# Patient Record
Sex: FEMALE | Race: WHITE | NOT HISPANIC OR LATINO | Employment: OTHER | URBAN - METROPOLITAN AREA
[De-identification: names, ages, dates, MRNs, and addresses within clinical notes are randomized per-mention and may not be internally consistent; named-entity substitution may affect disease eponyms.]

---

## 2017-09-28 ENCOUNTER — OFFICE VISIT (OUTPATIENT)
Dept: URGENT CARE | Facility: URGENT CARE | Age: 32
End: 2017-09-28
Payer: COMMERCIAL

## 2017-09-28 VITALS
TEMPERATURE: 98.2 F | SYSTOLIC BLOOD PRESSURE: 112 MMHG | BODY MASS INDEX: 21.55 KG/M2 | DIASTOLIC BLOOD PRESSURE: 62 MMHG | WEIGHT: 117.8 LBS | OXYGEN SATURATION: 99 % | HEART RATE: 51 BPM

## 2017-09-28 DIAGNOSIS — S09.90XA CLOSED HEAD INJURY, INITIAL ENCOUNTER: Primary | ICD-10-CM

## 2017-09-28 PROCEDURE — 99214 OFFICE O/P EST MOD 30 MIN: CPT | Performed by: FAMILY MEDICINE

## 2017-09-28 NOTE — PATIENT INSTRUCTIONS
Okay to take ibuprofen 200 mg - 4 tablets (800 mg) every 8 hours as needed.  Okay to take tylenol 500 mg - 2 tablets (1000 mg) every 6-8 hours as needed, do not exceed 3000 mg in 24 hours.  Ice to area of discomfort.       * HEAD INJURY, no wake-up (Adult)    You have had a head injury. It does not appear serious at this time. Sometimes symptoms of a more serious problem (concussion, bruising or bleeding in the brain) may appear later. Therefore, watch for the warning signs listed below.  HOME CARE:    During the next 24 hours someone must stay with you to check for the signs below. It is not necessary to stay awake or be awakened during the night.    If you have swelling of the face or scalp, apply an ice pack (ice cubes in a plastic bag, wrapped in a towel) for 20 minutes. Do this every 1-2 hours until the swelling starts to go down.    You may use acetaminophen (Tylenol) 650-1000 mg every 6 hours or ibuprofen (Motrin, Advil) 600 mg every 6-8 hours with food to control pain, if you are able to take these medicines. [NOTE: If you have chronic liver or kidney disease or ever had a stomach ulcer or GI bleeding, talk with your doctor before using these medicines.] Do not take aspirin after a head injury.    For the next 24 hours:    Do not take alcohol, sedatives or medicines that make you sleepy.    Do not drive or operate machinery.    Avoid strenuous activities. No lifting or straining.    If you have had any symptoms of a concussion today (nausea, vomiting, dizziness, confusion, headache, memory loss or if you were knocked out), do not return to sports or any activity that could result in another head injury until 2 full weeks after all symptoms are gone and you have been cleared by your doctor. A second head injury before fully recovering from the first one can lead to serious brain injury.  FOLLOW UP with your doctor if symptoms are not improving after 24 hours, or as directed.  GET PROMPT MEDICAL ATTENTION if  any of the following warning signs occur:    Repeated vomiting    Severe or worsening headache or dizziness    Unusual drowsiness, or unable to awaken as usual    Confusion or change in behavior or speech, memory loss, blurred vision    Convulsion (seizure)    Increasing scalp or face swelling    Redness, warmth or pus from the swollen area    Fluid drainage or bleeding from the nose or ears    9581-[address]160 [employer], [address] [place] [address]. All rights reserved. This information is not intended as a substitute for professional medical care. Always follow your healthcare professional's instructions.    Treatment for Mild Traumatic Brain Injury (Concussion)  A mild traumatic brain injury (TBI) is a sudden jolt to your head that causes a temporary change in the way your brain works. It could be caused by a blow to your head, a blast, or a sudden and severe movement of your head that bounces your brain inside your skull. Falls, fights, sports, and car accidents also can cause TBIs.  Treatment of mild TBI   Having a mild TBI can change the way you feel, act, move, and think. Even though you may look fine, a mild TBI can have a big impact on many areas of your life. A mild TBI can cause headaches, fatigue, memory problems, mood swings, and inability to focus your thoughts.  Treatment for mild TBI may be different, depending on symptoms and other unrelated medical issues; therefore, no 2 TBIs are the same. You may need to work with a TBI team -- a group of healthcare providers who help people recover from TBI. For example, you might work with a physical therapist to help with your balance and movement problems. Or you might work with an occupational therapist to help you function better at home and at work. Other medical experts may help you with emotional and thinking problems.  In some cases, your doctor may use medicine to ease symptoms while you recover. These may include pain relievers,  antidepressants, antianxiety medicine, sleep aids, and muscle relaxants. Although medicines can help, they are not a main part of treatment. You should not take any medicines unless discussed and approved by your healthcare provider. Things that you can do for yourself are usually as important as the medicines you are prescribed. This part of your treatment is called self-management.  Self-management for mild TBI  Most people with mild TBI recover completely, but it may take weeks or months. For some people, symptoms may continue for years. Because of this, self-management may continue long after you leave the hospital. Many lifestyle changes that help your brain recover are good habits that you should keep up even after you have recovered. Here are some tips:      Learn as much as you can about TBI. Share what you learn with friends and family.    Let your health care team know about all your symptoms.    Eat a healthy diet and drink plenty of fluids.    Get plenty of sleep.    Don t overexert yourself mentally or physically.    Don t smoke, take drugs, or drink alcohol.    Don t use caffeine or energy drinks as a way to make you feel less tired.    Avoid activities that could cause another jolt to your head. Don't return to sports or any activity that could cause you to hit your head until all symptoms are gone and you have been cleared by your doctor. A second head injury before fully recovering from the first one can lead to serious brain injury. Ask your health care provider what types of activities are safe.    Avoid mental stress. Learn some relaxation techniques like deep breathing.    Keep a pad and pencil handy. Write things down if you are having trouble concentrating or remembering.  Let your healthcare provider know if your symptoms are getting worse. And look out for other important mental symptoms. These include memory loss, having a hard time thinking clearly, having trouble controlling your  emotions, and depression. Also be sure to report physical symptoms such as worsening headaches, loss of balance, changes in vision, seizures, and vomiting.  Recovery from a mild TBI takes time. Be patient and give your brain time to heal. Be sure to rely on your support system, which includes friends and family members who understand what you are going through. You might also want to join a support group and share your feelings with others who have had a TBI.    Date Last Reviewed: 8/17/2015 2000-2017 [email or web address]. [address] [place] [id number]. All rights reserved. This information is not intended as a substitute for professional medical care. Always follow your healthcare professional's instructions.        What is Mild Traumatic Brain Injury (Concussion)?  A mild traumatic brain injury (TBI) is a sudden jolt to your head that causes the way your brain works to change temporarily. It is also called a concussion. This could be caused by a blow to your head, a blast, or a sudden and severe movement of your head that bounces your brain inside your skull. Falls, fights, sports, and motor vehicle accidents are other common causes.  In addition to mild TBI, there are two other types: moderate TBI and severe TBI. Your healthcare team will decide if your TBI is mild, moderate, or severe at the time of the injury. Sometimes the symptoms of a mild TBI are much like those of a more severe TBI. Because every brain is different, it can be hard to predict exactly what your symptoms or your specific recovery will be like.  Diagnosing a mild TBI  Most TBIs are mild. If you have a mild TBI, you might be knocked out for a short time or you might just feel stunned for a while. Your healthcare provider may evaluate you to see if you have had a mild TBI.   Diagnosing a mild TBI may be difficult because symptoms are similar to those of other conditions and signs may not show up on brain scans or X-rays. Your  healthcare provider may base your diagnosis on the following criteria;    Characteristics of the head injury    The types and severity of your symptoms    Risk factors that can lead to longer recovery period  Symptoms of a mild TBI  Having a mild TBI can change your brain in many ways. A mild TBI can change the way you think, feel, or act. The kind of symptoms you have depends on the location and extent that your brain is affected. Common symptoms of mild TBI can occur right away or a while after the injury. Early symptoms may include:    Headache    Dizziness    Not being sure of where you are    Memory problems    Being sick to your stomach    Vomiting    Vision problems  Later symptoms of a mild TBI may include:    Having frequent headaches    Feeling light-headed    Not being able to concentrate or pay attention    Feeling tired most of the time    Getting angry and irritated easily    Being bothered by bright light or loud noise    Having trouble focusing your eyes    Hearing ringing in your ears    Feeling anxious and depressed  Recovering from a mild TBI  Most people recover completely from mild TBI, but it may take days, weeks, or months. For some, symptoms may last even longer. Also, if you have had more than one TBI, your recovery may take longer. Every brain is different, so your recovery time will depend on how quickly your own brain is healing.  Don't return to sports or any activity that could cause you to hit your head until all symptoms are gone and you have been cleared by your doctor. A second head injury before fully recovering from the first one can lead to serious brain injury.  Avoid doing activities that require a lot of concentration or a lot of attention. This will allow your brain to rest and heal more quickly.  You can expect to have some good days and some bad days. It is important to give your brain time to recover and not push yourself too hard. Trying to  tough it out  can make your  symptoms worse. The best way to recover is to discuss symptoms with your healthcare provider, as well as your family. Work closely with your healthcare provider and give your brain time to heal.  Date Last Reviewed: 8/17/2015 2000-2017 [employer]. [address] [place] [address], [place] [address]. All rights reserved. This information is not intended as a substitute for professional medical care. Always follow your healthcare professional's instructions.

## 2017-09-28 NOTE — MR AVS SNAPSHOT
After Visit Summary   9/28/2017    Halie Rendon    MRN: [id number]           Patient Information     Date Of Birth          1985        Visit Information        Provider Department      9/28/2017 11:20 AM Wilner Flower MD [facility]        Today's Diagnoses     Closed head injury, initial encounter    -  1      Care Instructions    Okay to take ibuprofen 200 mg - 4 tablets (800 mg) every 8 hours as needed.  Okay to take tylenol 500 mg - 2 tablets (1000 mg) every 6-8 hours as needed, do not exceed 3000 mg in 24 hours.  Ice to area of discomfort.       * HEAD INJURY, no wake-up (Adult)    You have had a head injury. It does not appear serious at this time. Sometimes symptoms of a more serious problem (concussion, bruising or bleeding in the brain) may appear later. Therefore, watch for the warning signs listed below.  HOME CARE:    During the next 24 hours someone must stay with you to check for the signs below. It is not necessary to stay awake or be awakened during the night.    If you have swelling of the face or scalp, apply an ice pack (ice cubes in a plastic bag, wrapped in a towel) for 20 minutes. Do this every 1-2 hours until the swelling starts to go down.    You may use acetaminophen (Tylenol) 650-1000 mg every 6 hours or ibuprofen (Motrin, Advil) 600 mg every 6-8 hours with food to control pain, if you are able to take these medicines. [NOTE: If you have chronic liver or kidney disease or ever had a stomach ulcer or GI bleeding, talk with your doctor before using these medicines.] Do not take aspirin after a head injury.    For the next 24 hours:    Do not take alcohol, sedatives or medicines that make you sleepy.    Do not drive or operate machinery.    Avoid strenuous activities. No lifting or straining.    If you have had any symptoms of a concussion today (nausea, vomiting, dizziness, confusion, headache, memory loss or if you were knocked out), do not return to  sports or any activity that could result in another head injury until 2 full weeks after all symptoms are gone and you have been cleared by your doctor. A second head injury before fully recovering from the first one can lead to serious brain injury.  FOLLOW UP with your doctor if symptoms are not improving after 24 hours, or as directed.  GET PROMPT MEDICAL ATTENTION if any of the following warning signs occur:    Repeated vomiting    Severe or worsening headache or dizziness    Unusual drowsiness, or unable to awaken as usual    Confusion or change in behavior or speech, memory loss, blurred vision    Convulsion (seizure)    Increasing scalp or face swelling    Redness, warmth or pus from the swollen area    Fluid drainage or bleeding from the nose or ears    [id number] [facility], [address] [place] [address], [place] [id number]. All rights reserved. This information is not intended as a substitute for professional medical care. Always follow your healthcare professional's instructions.    Treatment for Mild Traumatic Brain Injury (Concussion)  A mild traumatic brain injury (TBI) is a sudden jolt to your head that causes a temporary change in the way your brain works. It could be caused by a blow to your head, a blast, or a sudden and severe movement of your head that bounces your brain inside your skull. Falls, fights, sports, and car accidents also can cause TBIs.  Treatment of mild TBI   Having a mild TBI can change the way you feel, act, move, and think. Even though you may look fine, a mild TBI can have a big impact on many areas of your life. A mild TBI can cause headaches, fatigue, memory problems, mood swings, and inability to focus your thoughts.  Treatment for mild TBI may be different, depending on symptoms and other unrelated medical issues; therefore, no 2 TBIs are the same. You may need to work with a TBI team -- a group of healthcare providers who help people recover from TBI. For example, you might  work with a physical therapist to help with your balance and movement problems. Or you might work with an occupational therapist to help you function better at home and at work. Other medical experts may help you with emotional and thinking problems.  In some cases, your doctor may use medicine to ease symptoms while you recover. These may include pain relievers, antidepressants, antianxiety medicine, sleep aids, and muscle relaxants. Although medicines can help, they are not a main part of treatment. You should not take any medicines unless discussed and approved by your healthcare provider. Things that you can do for yourself are usually as important as the medicines you are prescribed. This part of your treatment is called self-management.  Self-management for mild TBI  Most people with mild TBI recover completely, but it may take weeks or months. For some people, symptoms may continue for years. Because of this, self-management may continue long after you leave the hospital. Many lifestyle changes that help your brain recover are good habits that you should keep up even after you have recovered. Here are some tips:      Learn as much as you can about TBI. Share what you learn with friends and family.    Let your health care team know about all your symptoms.    Eat a healthy diet and drink plenty of fluids.    Get plenty of sleep.    Don t overexert yourself mentally or physically.    Don t smoke, take drugs, or drink alcohol.    Don t use caffeine or energy drinks as a way to make you feel less tired.    Avoid activities that could cause another jolt to your head. Don't return to sports or any activity that could cause you to hit your head until all symptoms are gone and you have been cleared by your doctor. A second head injury before fully recovering from the first one can lead to serious brain injury. Ask your health care provider what types of activities are safe.    Avoid mental stress. Learn some  relaxation techniques like deep breathing.    Keep a pad and pencil handy. Write things down if you are having trouble concentrating or remembering.  Let your healthcare provider know if your symptoms are getting worse. And look out for other important mental symptoms. These include memory loss, having a hard time thinking clearly, having trouble controlling your emotions, and depression. Also be sure to report physical symptoms such as worsening headaches, loss of balance, changes in vision, seizures, and vomiting.  Recovery from a mild TBI takes time. Be patient and give your brain time to heal. Be sure to rely on your support system, which includes friends and family members who understand what you are going through. You might also want to join a support group and share your feelings with others who have had a TBI.    Date Last Reviewed: 8/17/2015 2000-2017 [facility]. [address] [place] [id number]. All rights reserved. This information is not intended as a substitute for professional medical care. Always follow your healthcare professional's instructions.        What is Mild Traumatic Brain Injury (Concussion)?  A mild traumatic brain injury (TBI) is a sudden jolt to your head that causes the way your brain works to change temporarily. It is also called a concussion. This could be caused by a blow to your head, a blast, or a sudden and severe movement of your head that bounces your brain inside your skull. Falls, fights, sports, and motor vehicle accidents are other common causes.  In addition to mild TBI, there are two other types: moderate TBI and severe TBI. Your healthcare team will decide if your TBI is mild, moderate, or severe at the time of the injury. Sometimes the symptoms of a mild TBI are much like those of a more severe TBI. Because every brain is different, it can be hard to predict exactly what your symptoms or your specific recovery will be like.  Diagnosing a mild  TBI  Most TBIs are mild. If you have a mild TBI, you might be knocked out for a short time or you might just feel stunned for a while. Your healthcare provider may evaluate you to see if you have had a mild TBI.   Diagnosing a mild TBI may be difficult because symptoms are similar to those of other conditions and signs may not show up on brain scans or X-rays. Your healthcare provider may base your diagnosis on the following criteria;    Characteristics of the head injury    The types and severity of your symptoms    Risk factors that can lead to longer recovery period  Symptoms of a mild TBI  Having a mild TBI can change your brain in many ways. A mild TBI can change the way you think, feel, or act. The kind of symptoms you have depends on the location and extent that your brain is affected. Common symptoms of mild TBI can occur right away or a while after the injury. Early symptoms may include:    Headache    Dizziness    Not being sure of where you are    Memory problems    Being sick to your stomach    Vomiting    Vision problems  Later symptoms of a mild TBI may include:    Having frequent headaches    Feeling light-headed    Not being able to concentrate or pay attention    Feeling tired most of the time    Getting angry and irritated easily    Being bothered by bright light or loud noise    Having trouble focusing your eyes    Hearing ringing in your ears    Feeling anxious and depressed  Recovering from a mild TBI  Most people recover completely from mild TBI, but it may take days, weeks, or months. For some, symptoms may last even longer. Also, if you have had more than one TBI, your recovery may take longer. Every brain is different, so your recovery time will depend on how quickly your own brain is healing.  Don't return to sports or any activity that could cause you to hit your head until all symptoms are gone and you have been cleared by your doctor. A second head injury before fully recovering from the  first one can lead to serious brain injury.  Avoid doing activities that require a lot of concentration or a lot of attention. This will allow your brain to rest and heal more quickly.  You can expect to have some good days and some bad days. It is important to give your brain time to recover and not push yourself too hard. Trying to  tough it out  can make your symptoms worse. The best way to recover is to discuss symptoms with your healthcare provider, as well as your family. Work closely with your healthcare provider and give your brain time to heal.  Date Last Reviewed: 8/17/2015 2000-2017 [employer]. [address] [place] [address], [place] [address]. All rights reserved. This information is not intended as a substitute for professional medical care. Always follow your healthcare professional's instructions.                Follow-ups after your visit        Who to contact     If you have questions or need follow up information about today's clinic visit or your schedule please contact [facility] directly at [phone].  Normal or non-critical lab and imaging results will be communicated to you by [employer], letter or phone within 4 business days after the clinic has received the results. If you do not hear from us within 7 days, please contact the clinic through [facility] or phone. If you have a critical or abnormal lab result, we will notify you by phone as soon as possible.  Submit refill requests through [facility] [employer] [facility] or call your pharmacy and they will forward the refill request to us. Please allow 3 business days for your refill to be completed.          Additional Information About Your Visit        [facility] Information     [facility] gives you secure access to your electronic health record. If you see a primary care provider, you can also send messages to your care team and make appointments. If you have questions, please call your primary care clinic.  If you do not have a primary care  provider, please call [phone] and they will assist you.        Care EveryWhere ID     This is your Care EveryWhere ID. This could be used by other organizations to access your [facility] medical records  [id number]        Your Vitals Were     Pulse Temperature Pulse Oximetry BMI (Body Mass Index)          51 98.2  F (36.8  C) (Tympanic) 99% 21.55 kg/m2         Blood Pressure from Last 3 Encounters:   09/28/17 112/62   12/14/15 110/72   12/07/15 98/58    Weight from Last 3 Encounters:   09/28/17 117 lb 12.8 oz (53.4 kg)   12/14/15 118 lb 8 oz (53.8 kg)   12/07/15 122 lb (55.3 kg)              Today, you had the following     No orders found for display       Primary Care Provider Office Phone # Fax #    Christofer De La Cruz [phone]       [address] [facility] [id number]        Equal Access to Services     [facility]: Hadii aad ku hadasho Soomaali, waaxda luqadaha, qaybta kaalmada adeegyada, waxay idiin hayaan maurizioeg kharash lagianni . So [facility] [phone].    ATENCIÓN: Si habla español, tiene a loaiza disposición servicios gratuitos de asistencia lingüística. [facility] [phone].    We comply with applicable federal civil rights laws and [place] laws. We do not discriminate on the basis of race, color, national origin, age, disability sex, sexual orientation or gender identity.            Thank you!     Thank you for choosing [facility]  for your care. Our goal is always to provide you with excellent care. Hearing back from our patients is one way we can continue to improve our services. Please take a few minutes to complete the written survey that you may receive in the mail after your visit with us. Thank you!             Your Updated Medication List - Protect others around you: Learn how to safely use, store and throw away your medicines at www.disposemymeds.org.          This list is accurate as of: 9/28/17 12:00 PM.  Always use your most recent med  list.                   Brand Name Dispense Instructions for use Diagnosis    cod liver oil Caps capsule           order for DME     1 Device    Equipment being ordered: short CAM walker    Right foot pain, Closed displaced fracture of proximal phalanx of lesser toe of right foot, initial encounter

## 2017-09-28 NOTE — NURSING NOTE
"Chief Complaint   Patient presents with     Urgent Care     Head Injury     PT states hit head yesterday and now had an episode of confusion and mild headache.        Initial /62 (BP Location: Right arm, Patient Position: Chair, Cuff Size: Adult Regular)  Pulse 51  Temp 98.2  F (36.8  C) (Tympanic)  Wt 117 lb 12.8 oz (53.4 kg)  SpO2 99%  BMI 21.55 kg/m2 Estimated body mass index is 21.55 kg/(m^2) as calculated from the following:    Height as of 12/7/15: 5' 2\" (1.575 m).    Weight as of this encounter: 117 lb 12.8 oz (53.4 kg).  Medication Reconciliation: unable or not appropriate to perform   Koko Garcia CMA ([facility]) 9/28/2017 11:30 AM    "

## 2017-09-28 NOTE — PROGRESS NOTES
"SUBJECTIVE:  Chief Complaint   Patient presents with     Urgent Care     Head Injury     PT states hit head yesterday and now had an episode of confusion and mild headache.      Halie Rendon is a 32 year old female presents with a chief complaint of headache.    The injury occurred 1 day(s) ago (around 4 pm 9/27)   The injury happened while at home. How: bending over to  something, got up and accidentally hit the top of her head against counter.  No LOC.. This is not the first time this type of injury has occurred to this patient, states that is \"clumsy\" and did hit her head similar fashion about a month ago.  Patient states that hurt bad enough that she cried.  No swelling, did not ice or take any medication for this yesterday.  This morning was driving and got lost and felt confused and disoriented, thinks this lasted about 10 minutes.  Endorsed mild headache, upper back and mild neck tenderness.  No nausea, vomiting, blurry vision.    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    Med Hx - ADD - mild    Care normally thru OB/GYN.    Past Medical History:   Diagnosis Date     Gestational diabetes mellitus     diet controlled     Nephrolithiasis     has a kidney stone     Varicella vaccination 2014    documented immunity     Current Outpatient Prescriptions   Medication Sig Dispense Refill     Cod Liver Oil CAPS        order for DME Equipment being ordered: short CAM walker (Patient not taking: Reported on 9/28/2017) 1 Device 0     Social History   Substance Use Topics     Smoking status: Former Smoker     Smokeless tobacco: Never Used      Comment: rare cigarette use     Alcohol use Yes      Comment: rare       ROS:  CONSTITUTIONAL:NEGATIVE for fever, chills, change in weight  INTEGUMENTARY/SKIN: NEGATIVE for worrisome rashes, moles or lesions  EYES: NEGATIVE for vision changes or irritation  ENT/MOUTH: " NEGATIVE for ear, mouth and throat problems  RESP:NEGATIVE for significant cough or SOB  CV: NEGATIVE for chest pain, palpitations or peripheral edema  GI: NEGATIVE for nausea, abdominal pain, heartburn, or change in bowel habits  MUSCULOSKELETAL: POSITIVE  for neck pain and injury to head  NEURO: NEGATIVE for weakness, dizziness or paresthesias and POSITIVE for head ache  ENDOCRINE: NEGATIVE for temperature intolerance, skin/hair changes  HEME/ALLERGY/IMMUNE: NEGATIVE for bleeding problems  PSYCHIATRIC: NEGATIVE for changes in mood or affect and POSITIVE for Hx ADD    EXAM:   /62 (BP Location: Right arm, Patient Position: Chair, Cuff Size: Adult Regular)  Pulse 51  Temp 98.2  F (36.8  C) (Tympanic)  Wt 117 lb 12.8 oz (53.4 kg)  SpO2 99%  BMI 21.55 kg/m2  GENERAL APPEARANCE: healthy, alert and no distress  HEAD: normocephalic, atraumatic, no swelling  EYES: PERRL, EOM intact, conjunctiva clear  EARS: ear canals normal, TM normal  MOUTH: moist, pharynx normal  NECK:  Full ROM and mild discomfort  CHEST: clear to auscultation  CV: regular rate and rhythm  EXTREMITIES: peripheral pulses normal  SKIN: no suspicious lesions or rashes  NEURO: Normal strength and tone, sensory exam grossly normal, mentation intact and speech normal  PSYCH: alert, oriented, affect - normal    X-RAY was not done.    ASSESSMENT/PLAN:   (S09.90XA) Closed head injury, initial encounter  (primary encounter diagnosis)  Plan: brain rest, symptomatic treatment      Patient appears well, no concerns for acute head bleed, discussed that repeated head injuries may cause symptoms to persists longer.  At this time, no need for head CT scan, encourage brain rest, tylenol and ibuprofen for discomfort, ice to area of head, warm compress to neck area.  Reviewed danger signs that would warrant ER evaluation.  Discussed possible concussion syndrome and may need follow up if symptoms persists.    Encourage to establish with primary provider for  follow up if symptoms persists.    Wilner Flower MD  September 28, 2017 2:14 PM

## 2017-10-13 ENCOUNTER — OFFICE VISIT (OUTPATIENT)
Dept: PEDIATRICS | Facility: CLINIC | Age: 32
End: 2017-10-13
Payer: COMMERCIAL

## 2017-10-13 VITALS
DIASTOLIC BLOOD PRESSURE: 60 MMHG | TEMPERATURE: 97.6 F | WEIGHT: 118.5 LBS | BODY MASS INDEX: 21.81 KG/M2 | OXYGEN SATURATION: 98 % | HEART RATE: 62 BPM | SYSTOLIC BLOOD PRESSURE: 98 MMHG | HEIGHT: 62 IN

## 2017-10-13 DIAGNOSIS — Z23 NEED FOR PROPHYLACTIC VACCINATION AND INOCULATION AGAINST INFLUENZA: Primary | ICD-10-CM

## 2017-10-13 DIAGNOSIS — L08.9 INFECTED CYST OF SKIN: ICD-10-CM

## 2017-10-13 DIAGNOSIS — Z86.32 HISTORY OF GESTATIONAL DIABETES: ICD-10-CM

## 2017-10-13 DIAGNOSIS — L72.9 INFECTED CYST OF SKIN: ICD-10-CM

## 2017-10-13 PROCEDURE — 90686 IIV4 VACC NO PRSV 0.5 ML IM: CPT | Performed by: INTERNAL MEDICINE

## 2017-10-13 PROCEDURE — 99213 OFFICE O/P EST LOW 20 MIN: CPT | Mod: 25 | Performed by: INTERNAL MEDICINE

## 2017-10-13 PROCEDURE — 90471 IMMUNIZATION ADMIN: CPT | Performed by: INTERNAL MEDICINE

## 2017-10-13 RX ORDER — CLINDAMYCIN HCL 150 MG
150 CAPSULE ORAL 3 TIMES DAILY
Qty: 15 CAPSULE | Refills: 0 | Status: SHIPPED | OUTPATIENT
Start: 2017-10-13 | End: 2017-10-18

## 2017-10-13 RX ORDER — SULFAMETHOXAZOLE AND TRIMETHOPRIM 400; 80 MG/1; MG/1
1 TABLET ORAL 2 TIMES DAILY
Qty: 10 TABLET | Refills: 0 | Status: SHIPPED | OUTPATIENT
Start: 2017-10-13 | End: 2017-10-13

## 2017-10-13 NOTE — NURSING NOTE
"Chief Complaint   Patient presents with     Establish Care       Initial BP 98/60 (Cuff Size: Adult Regular)  Pulse 62  Temp 97.6  F (36.4  C) (Tympanic)  Ht 5' 2.25\" (1.581 m)  Wt 118 lb 8 oz (53.8 kg)  LMP 09/27/2017  SpO2 98%  BMI 21.5 kg/m2 Estimated body mass index is 21.5 kg/(m^2) as calculated from the following:    Height as of this encounter: 5' 2.25\" (1.581 m).    Weight as of this encounter: 118 lb 8 oz (53.8 kg).  Medication Reconciliation: complete   Cookie Padilla CMA    "

## 2017-10-13 NOTE — PATIENT INSTRUCTIONS
Dermatology visit:  Tuesday 10/17 at 10:15 am      Epidermoid Cyst (Sebaceous Cyst), Infected (Antibiotic Treatment)  You have an epidermoid cyst. This is a small, painless lump under your skin. An epidermoid cyst (often called a sebaceous cyst, epidermal cyst, or epidermal inclusion cyst) is a term most often used for 2 similar types of cysts:    Epidermoid cysts. These cysts form slowly under the skin. They can be found on most parts of the body. But they are most often found on areas with more hair such as the scalp, face, upper back, and genitals.    Pilar cysts. These are similar to epidermoid cysts. But they start from a different part of the hair follicle. They are more likely to be on the scalp.  Here are some general facts about these cysts:    A cyst is a sac filled with material that is often cheesy, fatty, oily, or stringy. The material inside can be thick. Or it can be a liquid.    You can usually move the cyst slightly if you try.    The cysts can be smaller than a pea or as large as a few inches.    The cysts are usually not painful, unless they become inflamed or infected.    The area around the cyst may smell bad. If the cyst breaks open, the material inside it often smells bad as well.  Your cyst became infected and your healthcare provider wanted to treat it with antibiotics. If the antibiotics don t clear up the infection, the cyst will need to be drained by making a small cut (incision). Local anesthesia will be used to numb the area.  Home care    Resist the temptation to squeeze or pop the cyst, stick a needle in it, or cut it open. This often leads to a worsening infection and scarring.    Take the antibiotic as directed until it is all used up.    Soak the affected area in hot water or apply a hot pack (a thin, clean towel soaked in hot water) for 20 minutes at a time. Do this 3 to 4 times a day.    Apply antibiotic cream or ointment 3 times a day.    You may use over-the-counter pain  medicine to control pain, unless another medicine was given. If you have chronic liver or kidney disease or ever had a stomach ulcer or GI bleeding, talk with your healthcare provider before using these medicines.  Prevention  Once this infection has healed, reduce the risk of future infections by:    Keeping the cyst area clean by bathing or showering daily    Avoiding tight-fitting clothing in the cyst area  Follow-up care  Follow up with your healthcare provider, or as advised. If a gauze packing was put in your wound, it should be removed in a few days as advised by your healthcare provider. Check your wound every day for the signs listed below.  When to seek medical advice  Call your healthcare provider right away if any of these occur:    Pus coming from the cyst    Increasing redness around the wound    Increasing local pain or swelling    Fever of 100.4 F (38 C) or higher, or as directed by your provider  Date Last Reviewed: 10/5/2016    [id number] [employer]. [address] [place] [address], [place] [address]. All rights reserved. This information is not intended as a substitute for professional medical care. Always follow your healthcare professional's instructions.

## 2017-10-13 NOTE — PROGRESS NOTES
SUBJECTIVE:   Halie Rendon is a 32 year old female who presents to clinic today for the following health issues:    1. New Patient/Transfer of Care- Patient here to establish care  2. Would like to discuss getting pregnant again - had gestational diabetes last pregnancy.  Has questions about how to maintain a healthy diet and decrease her risk of developing DM. Also wants to know if she can avoid gestational diabetes this time around.  3. Mass on neck:    Mass  Lump on neck - changing size - getting bigger over the past 1-2 weeks      Duration: 3 years approx - has assumed it was a cyst and was watching it    Description (location/character/radiation): behind right ear on neck - recently getting larger and becoming painful    Intensity:  mild    Accompanying signs and symptoms: raised area, some redness on edges of mass    History (similar episodes/previous evaluation): None    Precipitating or alleviating factors: None    Therapies tried and outcome: None: Symptoms not alleviated       Problem list and histories reviewed & adjusted, as indicated.  Additional history: as documented    Patient Active Problem List   Diagnosis     Family history of autism     See [facility] 14     Blood type, Rh negative/O Negative     Club foot of fetus     Gross hematuria--needs follow up postpartum with Urology.     Plans Paragard IUD postpartum     S/P      Past Surgical History:   Procedure Laterality Date      SECTION N/A 10/2/2014    Procedure:  SECTION;  Surgeon: Domenic Timmons MD;  Location:  [facility]     DENTAL SURGERY         Social History   Substance Use Topics     Smoking status: Former Smoker     Smokeless tobacco: Never Used      Comment: rare cigarette use     Alcohol use Yes      Comment: rare     Family History   Problem Relation Age of Onset     Breast Cancer Mother 56     HEART DISEASE Paternal Grandfather      MI-     Breast Cancer Maternal Grandmother   "     age 89     CANCER Other      maternal great grandmother cervical or uterine cancer         Current Outpatient Prescriptions   Medication Sig Dispense Refill     order for DME Equipment being ordered: short CAM walker (Patient not taking: Reported on 2017) 1 Device 0     Cod Liver Oil CAPS        No Known Allergies      Reviewed and updated as needed this visit by clinical staffTobacco       Reviewed and updated as needed this visit by Provider         ROS:  All other systems on a 10-point review are negative    OBJECTIVE:     BP 98/60 (Cuff Size: Adult Regular)  Pulse 62  Temp 97.6  F (36.4  C) (Tympanic)  Ht 5' 2.25\" (1.581 m)  Wt 118 lb 8 oz (53.8 kg)  LMP 2017  SpO2 98%  BMI 21.5 kg/m2  Body mass index is 21.5 kg/(m^2).  GENERAL: healthy, alert and no distress  EYES: Eyes grossly normal to inspection, PERRL and conjunctivae and sclerae normal  HENT: ear canals and TM's normal, nose and mouth without ulcers or lesions  NECK: see below, otherwise no adenopathy, no asymmetry, masses, or scars and thyroid normal to palpation  RESP: lungs clear to auscultation - no rales, rhonchi or wheezes  CV: regular rate and rhythm, normal S1 S2, no S3 or S4, no murmur, click or rub, no peripheral edema and peripheral pulses strong  ABDOMEN: soft, nontender, no hepatosplenomegaly, no masses and bowel sounds normal  MS: no gross musculoskeletal defects noted, no edema  SKIN: no suspicious lesions or rashes  NEURO: Normal strength and tone, mentation intact and speech normal  PSYCH: mentation appears normal, affect normal/bright    1.5 cm erythematous firm, mobile mass with no notable fluctuance and mild surrounding soft tissue induration.    Diagnostic Test Results:  none     ASSESSMENT/PLAN:     (Z86.32) History of gestational diabetes  Comment: counseled patient on limiting simple and processed carbohydrates  Plan: PRENATAL NUTRITION EDUCATION, NUTRITION         REFERRAL      (L72.9,  L08.9) " Infected cyst of skin  Comment: referral to dermatology next week (made appointment)  Plan: clindamycin (CLEOCIN) 150 MG capsule,   -- fill abx only if increased redness, pain, size, and/or fever    (Z23) Need for prophylactic vaccination and inoculation against influenza  (primary encounter diagnosis)  Plan: FLU VAC, SPLIT VIRUS IM > 3 YO (QUADRIVALENT)         [33396], Vaccine Administration, Initial         [87154]      See Patient Instructions    Slime Ibarra MD  [facility]    Injectable Influenza Immunization Documentation    1.  Is the person to be vaccinated sick today?   No    2. Does the person to be vaccinated have an allergy to a component   of the vaccine?   No    3. Has the person to be vaccinated ever had a serious reaction   to influenza vaccine in the past?   No    4. Has the person to be vaccinated ever had Guillain-Barré syndrome?   No    Form completed by Cookie Padilla CMA

## 2017-10-13 NOTE — MR AVS SNAPSHOT
After Visit Summary   10/13/2017    Halie Rendon    MRN: [id number]           Patient Information     Date Of Birth          1985        Visit Information        Provider Department      10/13/2017 10:50 AM Angus Ibarra Mai, MD [facility] [place]        Today's Diagnoses     Need for prophylactic vaccination and inoculation against influenza    -  1    Infected cyst of skin        History of gestational diabetes          Care Instructions    Dermatology visit:  Tuesday 10/17 at 10:15 am      Epidermoid Cyst (Sebaceous Cyst), Infected (Antibiotic Treatment)  You have an epidermoid cyst. This is a small, painless lump under your skin. An epidermoid cyst (often called a sebaceous cyst, epidermal cyst, or epidermal inclusion cyst) is a term most often used for 2 similar types of cysts:    Epidermoid cysts. These cysts form slowly under the skin. They can be found on most parts of the body. But they are most often found on areas with more hair such as the scalp, face, upper back, and genitals.    Pilar cysts. These are similar to epidermoid cysts. But they start from a different part of the hair follicle. They are more likely to be on the scalp.  Here are some general facts about these cysts:    A cyst is a sac filled with material that is often cheesy, fatty, oily, or stringy. The material inside can be thick. Or it can be a liquid.    You can usually move the cyst slightly if you try.    The cysts can be smaller than a pea or as large as a few inches.    The cysts are usually not painful, unless they become inflamed or infected.    The area around the cyst may smell bad. If the cyst breaks open, the material inside it often smells bad as well.  Your cyst became infected and your healthcare provider wanted to treat it with antibiotics. If the antibiotics don t clear up the infection, the cyst will need to be drained by making a small cut (incision). Local anesthesia will be used to numb the  area.  Home care    Resist the temptation to squeeze or pop the cyst, stick a needle in it, or cut it open. This often leads to a worsening infection and scarring.    Take the antibiotic as directed until it is all used up.    Soak the affected area in hot water or apply a hot pack (a thin, clean towel soaked in hot water) for 20 minutes at a time. Do this 3 to 4 times a day.    Apply antibiotic cream or ointment 3 times a day.    You may use over-the-counter pain medicine to control pain, unless another medicine was given. If you have chronic liver or kidney disease or ever had a stomach ulcer or GI bleeding, talk with your healthcare provider before using these medicines.  Prevention  Once this infection has healed, reduce the risk of future infections by:    Keeping the cyst area clean by bathing or showering daily    Avoiding tight-fitting clothing in the cyst area  Follow-up care  Follow up with your healthcare provider, or as advised. If a gauze packing was put in your wound, it should be removed in a few days as advised by your healthcare provider. Check your wound every day for the signs listed below.  When to seek medical advice  Call your healthcare provider right away if any of these occur:    Pus coming from the cyst    Increasing redness around the wound    Increasing local pain or swelling    Fever of 100.4 F (38 C) or higher, or as directed by your provider  Date Last Reviewed: 10/5/2016    9265-0798 [employer]. [address] [place] [address]. All rights reserved. This information is not intended as a substitute for professional medical care. Always follow your healthcare professional's instructions.                Follow-ups after your visit        Additional Services     NUTRITION REFERRAL       Your provider has referred you to: [facility]: [facility] [phone]   [email or web address]    Please be aware that coverage of  these services is subject to the terms and limitations of your health insurance plan.  Call member services at your health plan with any benefit or coverage questions.      Please bring the following with you to your appointment:    (1) This referral request  (2) Any documents given to you regarding this referral  (3) Any specific questions you have about diet and/or food choices                  Follow-up notes from your care team     Return in about 4 days (around 10/17/2017).      Your next 10 appointments already scheduled     Oct 17, 2017 10:15 AM CDT   New Visit with Kirstin Harris MD   [facility]    [address] [place] [address]  [address]  [facility] [address] [phone]              Who to contact     If you have questions or need follow up information about today's clinic visit or your schedule please contact [facility] directly at [phone].  Normal or non-critical lab and imaging results will be communicated to you by MyChart, letter or phone within 4 business days after the clinic has received the results. If you do not hear from us within 7 days, please contact the clinic through [employer] or phone. If you have a critical or abnormal lab result, we will notify you by phone as soon as possible.  Submit refill requests through [employer] or call your pharmacy and they will forward the refill request to us. Please allow 3 business days for your refill to be completed.          Additional Information About Your Visit        MyChart Information     [employer] gives you secure access to your electronic health record. If you see a primary care provider, you can also send messages to your care team and make appointments. If you have questions, please call your primary care clinic.  If you do not have a primary care provider, please call [phone] and they will assist you.        Care EveryWhere ID     This is your Care EveryWhere ID. This could be used  "by other organizations to access your [facility] medical records  [id number]        Your Vitals Were     Pulse Temperature Height Last Period Pulse Oximetry BMI (Body Mass Index)    62 97.6  F (36.4  C) (Tympanic) 5' 2.25\" (1.581 m) 09/27/2017 98% 21.5 kg/m2       Blood Pressure from Last 3 Encounters:   10/13/17 98/60   09/28/17 112/62   12/14/15 110/72    Weight from Last 3 Encounters:   10/13/17 118 lb 8 oz (53.8 kg)   09/28/17 117 lb 12.8 oz (53.4 kg)   12/14/15 118 lb 8 oz (53.8 kg)              We Performed the Following     FLU VAC, SPLIT VIRUS IM > 3 YO (QUADRIVALENT) [88854]     NUTRITION REFERRAL     PRENATAL NUTRITION EDUCATION     Vaccine Administration, Initial [98709]          Today's Medication Changes          These changes are accurate as of: 10/13/17 11:43 AM.  If you have any questions, ask your nurse or doctor.               Start taking these medicines.        Dose/Directions    sulfamethoxazole-trimethoprim 400-80 MG per tablet   Commonly known as:  BACTRIM/SEPTRA   Used for:  Infected cyst of skin   Started by:  Angus Ibarra Mai, MD        Dose:  1 tablet   Take 1 tablet by mouth 2 times daily for 5 days   Quantity:  10 tablet   Refills:  0            Where to get your medicines      These medications were sent to [facility] - [address]   [address]  [address], [place] [address]     Phone:  [phone]     sulfamethoxazole-trimethoprim 400-80 MG per tablet                Primary Care Provider Office Phone # Fax #    Christofer De La Cruz [phone]       [address] [facility] [address]        Equal Access to Services     JO YBARRA AH: Delroy Maria, wadavina luqfran, qabryta kaalmada adeharrisyada, nany crane. So [facility] [phone].    ATENCIÓN: Si habla español, tiene a loaiza disposición servicios gratuitos de asistencia lingüística. Llame al " [phone].    We comply with applicable federal civil rights laws and [place] laws. We do not discriminate on the basis of race, color, national origin, age, disability, sex, sexual orientation, or gender identity.            Thank you!     Thank you for choosing [facility]  for your care. Our goal is always to provide you with excellent care. Hearing back from our patients is one way we can continue to improve our services. Please take a few minutes to complete the written survey that you may receive in the mail after your visit with us. Thank you!             Your Updated Medication List - Protect others around you: Learn how to safely use, store and throw away your medicines at www.disposemymeds.org.          This list is accurate as of: 10/13/17 11:43 AM.  Always use your most recent med list.                   Brand Name Dispense Instructions for use Diagnosis    cod liver oil Caps capsule           order for DME     1 Device    Equipment being ordered: short CAM walker    Right foot pain, Closed displaced fracture of proximal phalanx of lesser toe of right foot, initial encounter       sulfamethoxazole-trimethoprim 400-80 MG per tablet    BACTRIM/SEPTRA    10 tablet    Take 1 tablet by mouth 2 times daily for 5 days    Infected cyst of skin

## 2017-10-17 ENCOUNTER — OFFICE VISIT (OUTPATIENT)
Dept: DERMATOLOGY | Facility: CLINIC | Age: 32
End: 2017-10-17
Payer: COMMERCIAL

## 2017-10-17 DIAGNOSIS — L72.0 EIC (EPIDERMAL INCLUSION CYST): Primary | ICD-10-CM

## 2017-10-17 DIAGNOSIS — L81.4 SOLAR LENTIGO: ICD-10-CM

## 2017-10-17 PROCEDURE — 99203 OFFICE O/P NEW LOW 30 MIN: CPT | Mod: 25 | Performed by: DERMATOLOGY

## 2017-10-17 PROCEDURE — 11900 INJECT SKIN LESIONS </W 7: CPT | Performed by: DERMATOLOGY

## 2017-10-17 RX ORDER — MULTIPLE VITAMINS W/ MINERALS TAB 9MG-400MCG
1 TAB ORAL DAILY
COMMUNITY
End: 2018-01-16 | Stop reason: ALTCHOICE

## 2017-10-17 NOTE — NURSING NOTE
"Chief Complaint   Patient presents with     Consult     new pt, cyst on right side of neck started 4-5 years ago, grown in size now little painful tx: none        Initial LMP 09/27/2017 Estimated body mass index is 21.5 kg/(m^2) as calculated from the following:    Height as of 10/13/17: 5' 2.25\" (158.1 cm).    Weight as of 10/13/17: 118 lb 8 oz (53.8 kg).  Medication Reconciliation: complete   Mervat Vila MA      "

## 2017-10-17 NOTE — PROGRESS NOTES
Dermatology Clinic Note    Dermatology Problem List:  1. eic on the R lateral neck      CC: Patient presents with:  Consult: new pt, cyst on right side of neck started 4-5 years ago, grown in size now little painful tx: none     HPI: Halie Rendon is a 32 year old female presenting for initial evaluation of cyst on the R neck seen at the request of Dr. De La Cruz. Lesion has been present for 5 years and has been growing. Several weeks ago it became red and irritated. Seen and treated with clindamycin PO for presumed infection. Area remains red and tender. No other similar spots. No past history of trauma. Notes that she is planning a pregnancy and may be pregnant now.       Patient Active Problem List   Diagnosis     Family history of autism     See [facility] records 14     Blood type, Rh negative/O Negative     Club foot of fetus     Gross hematuria--needs follow up postpartum with Urology.     Plans Paragard IUD postpartum     S/P        No Known Allergies      Current Outpatient Prescriptions   Medication     multivitamin, therapeutic with minerals (MULTI-VITAMIN) TABS tablet     Cod Liver Oil CAPS     clindamycin (CLEOCIN) 150 MG capsule     No current facility-administered medications for this visit.        Family History   Problem Relation Age of Onset     Breast Cancer Mother 56     HEART DISEASE Paternal Grandfather      MI-     Breast Cancer Maternal Grandmother       age 89     CANCER Other      maternal great grandmother cervical or uterine cancer       Social History     Social History     Marital status:      Spouse name: N/A     Number of children: 0     Years of education: N/A     Occupational History      Unemployed     Social History Main Topics     Smoking status: Former Smoker     Smokeless tobacco: Never Used      Comment: rare cigarette use     Alcohol use Yes      Comment: rare     Drug use: No      Comment: Did smoke appx. 10 years ago     Sexual  activity: Yes     Partners: Male     Other Topics Concern     Blood Transfusions No     Seat Belt Yes     Parent/Sibling W/ Cabg, Mi Or Angioplasty Before 65f 55m? No     Social History Narrative    Halie lives with her spouse          ROS: Feeling well without other skin concerns.     EXAM:  LMP 09/27/2017  GEN: Alert, no distress  HEENT: Conjunctiva clear.   PULM: Breathing comfortably on RA  CV: Extrem warm and well perfused  ABD: No distension  SKIN: Exam of the face and neck  --Scattered light brown macules on the lateral cheeks, upper chest  --R lateral mid neck with approx 2.5 cm firm nodule with central puncta and overlying mild erythema.     Assessment and Plan:    1. Solar lentigines: Marker of past sun injury. Ongoing sun protection recommended.     2. Epidermal inclusion cyst on the R neck, inflamed. Injected 0.2 cc of kenalog 10 mg/ml into the lesion today. Noted that the lesion is likely to persist until surgically removed. Risks discussed. This would be an elective procedure so she may consider waiting until after she delivers if she discovers that she is pregnant in the next month.       Thank you for involving me in this patient's care.     Kirstin Harris MD  Dermatology Staff    CC:     Christofer De La Cruz MD

## 2017-10-17 NOTE — LETTER
Date:October 18, 2017      Patient was self referred, no letter generated. Do not send.        [facility] Information

## 2017-10-17 NOTE — LETTER
10/17/2017      RE: Halie Rendon  [address] [place] [address]       Dermatology Clinic Note    Dermatology Problem List:  1. eic on the R lateral neck      CC: Patient presents with:  Consult: new pt, cyst on right side of neck started 4-5 years ago, grown in size now little painful tx: none     HPI: Halie Rendon is a 32 year old female presenting for initial evaluation of cyst on the R neck seen at the request of Dr. De La Cruz. Lesion has been present for 5 years and has been growing. Several weeks ago it became red and irritated. Seen and treated with clindamycin PO for presumed infection. Area remains red and tender. No other similar spots. No past history of trauma. Notes that she is planning a pregnancy and may be pregnant now.       Patient Active Problem List   Diagnosis     Family history of autism     See [facility] records 14     Blood type, Rh negative/O Negative     Club foot of fetus     Gross hematuria--needs follow up postpartum with Urology.     Plans Paragard IUD postpartum     S/P        No Known Allergies      Current Outpatient Prescriptions   Medication     multivitamin, therapeutic with minerals (MULTI-VITAMIN) TABS tablet     Cod Liver Oil CAPS     clindamycin (CLEOCIN) 150 MG capsule     No current facility-administered medications for this visit.        Family History   Problem Relation Age of Onset     Breast Cancer Mother 56     HEART DISEASE Paternal Grandfather      MI-     Breast Cancer Maternal Grandmother       age 89     CANCER Other      maternal great grandmother cervical or uterine cancer       Social History     Social History     Marital status:      Spouse name: N/A     Number of children: 0     Years of education: N/A     Occupational History      Unemployed     Social History Main Topics     Smoking status: Former Smoker     Smokeless tobacco: Never Used      Comment: rare cigarette use     Alcohol use Yes      Comment:  rare     Drug use: No      Comment: Did smoke appx. 10 years ago     Sexual activity: Yes     Partners: Male     Other Topics Concern     Blood Transfusions No     Seat Belt Yes     Parent/Sibling W/ Cabg, Mi Or Angioplasty Before 65f 55m? No     Social History Narrative    Halie lives with her spouse          ROS: Feeling well without other skin concerns.     EXAM:  LMP 09/27/2017  GEN: Alert, no distress  HEENT: Conjunctiva clear.   PULM: Breathing comfortably on RA  CV: Extrem warm and well perfused  ABD: No distension  SKIN: Exam of the face and neck  --Scattered light brown macules on the lateral cheeks, upper chest  --R lateral mid neck with approx 2.5 cm firm nodule with central puncta and overlying mild erythema.     Assessment and Plan:    1. Solar lentigines: Marker of past sun injury. Ongoing sun protection recommended.     2. Epidermal inclusion cyst on the R neck, inflamed. Injected 0.2 cc of kenalog 10 mg/ml into the lesion today. Noted that the lesion is likely to persist until surgically removed. Risks discussed. This would be an elective procedure so she may consider waiting until after she delivers if she discovers that she is pregnant in the next month.       Thank you for involving me in this patient's care.     Kirstin Harris MD  Dermatology Staff    CC:     MD Kirstin Yap MD

## 2017-10-17 NOTE — MR AVS SNAPSHOT
After Visit Summary   10/17/2017    Halie Rendon    MRN: [id number]           Patient Information     Date Of Birth          1985        Visit Information        Provider Department      10/17/2017 10:15 AM Kirstin Harris MD [facility]        Today's Diagnoses     EIC (epidermal inclusion cyst)    -  1    Solar lentigo          Care Instructions                    Pediatric Dermatology  [facility]  [address] [facility]  1st [place]  [address]  Phone: [phone]    Pediatric & Adult Dermatology  [facility]  [address] [place] [address]  2nd Floor  [facility] [address]  [phone]                  General information: Dr. Kirstin Harris is a board-certified dermatologist with subspecialty certification in pediatric dermatology.     Scheduling and Nurse Triage: Dr. Harris sees pediatric patients on Mondays in [place] and adult and pediatric patients on Tuesdays in [place]. The remainder of the week she practices at the [facility]. Please call the above phone numbers to schedule or to talk to a nurse.     -For scheduling at the [place] or [place] locations, or to talk to the triage nurse please call the above phone number at the clinic where you were seen.     -For medication refills, please call your pharmacy.                     Follow-ups after your visit        Your next 10 appointments already scheduled     Nov 21, 2017 10:30 AM CST   PROCEDURE with Kirstin Harris MD   [facility] ([facility])    [address] [place] [address]  [address]  [facility] [address] [phone]              Who to contact     If you have questions or need follow up information about today's clinic visit or your schedule please contact [facility] directly at [phone].  Normal or non-critical lab and imaging results will be  communicated to you by Backtrace I/Ohart, letter or phone within 4 business days after the clinic has received the results. If you do not hear from us within 7 days, please contact the clinic through [employer] or phone. If you have a critical or abnormal lab result, we will notify you by phone as soon as possible.  Submit refill requests through [employer] or call your pharmacy and they will forward the refill request to us. Please allow 3 business days for your refill to be completed.          Additional Information About Your Visit        [employer] Information     [employer] gives you secure access to your electronic health record. If you see a primary care provider, you can also send messages to your care team and make appointments. If you have questions, please call your primary care clinic.  If you do not have a primary care provider, please call [phone] and they will assist you.        Care EveryWhere ID     This is your Care EveryWhere ID. This could be used by other organizations to access your [place] medical records  [id number]        Your Vitals Were     Last Period                   09/27/2017            Blood Pressure from Last 3 Encounters:   10/13/17 98/60   09/28/17 112/62   12/14/15 110/72    Weight from Last 3 Encounters:   10/13/17 118 lb 8 oz (53.8 kg)   09/28/17 117 lb 12.8 oz (53.4 kg)   12/14/15 118 lb 8 oz (53.8 kg)              Today, you had the following     No orders found for display       Primary Care Provider Office Phone # Fax #    Christofer Alida De La Cruz [phone]       [address] [facility] [address]        Equal Access to Services     [facility]: Hadii aad ku hadasho Soomaali, waaxda luqadaha, qaybta kaalmada adeegyada, nany murcia . So [facility] [phone].    ATENCIÓN: Si habla español, tiene a loaiza disposición servicios gratuitos de asistencia lingüística. Llame al [phone].    We comply with applicable federal civil  rights laws and [place] laws. We do not discriminate on the basis of race, color, national origin, age, disability, sex, sexual orientation, or gender identity.            Thank you!     Thank you for choosing [facility]  for your care. Our goal is always to provide you with excellent care. Hearing back from our patients is one way we can continue to improve our services. Please take a few minutes to complete the written survey that you may receive in the mail after your visit with us. Thank you!             Your Updated Medication List - Protect others around you: Learn how to safely use, store and throw away your medicines at www.disposemymeds.org.          This list is accurate as of: 10/17/17 10:55 AM.  Always use your most recent med list.                   Brand Name Dispense Instructions for use Diagnosis    clindamycin 150 MG capsule    CLEOCIN    15 capsule    Take 1 capsule (150 mg) by mouth 3 times daily for 5 days    Infected cyst of skin       cod liver oil Caps capsule           Multi-vitamin Tabs tablet      Take 1 tablet by mouth daily

## 2017-10-17 NOTE — PATIENT INSTRUCTIONS
Pediatric Dermatology  [facility]  [address]  1st [place]  [address]  Phone: [phone]    Pediatric & Adult Dermatology  [facility]  [address] [facility]   2nd Floor  [facility] [address]  [phone]                  General information: Dr. Kirstin Harris is a board-certified dermatologist with subspecialty certification in pediatric dermatology.     Scheduling and Nurse Triage: Dr. Harris sees pediatric patients on Mondays in [place] and adult and pediatric patients on Tuesdays in [place]. The remainder of the week she practices at the [facility]. Please call the above phone numbers to schedule or to talk to a nurse.     -For scheduling at the [place] or [place] locations, or to talk to the triage nurse please call the above phone number at the clinic where you were seen.     -For medication refills, please call your pharmacy.

## 2017-11-01 ENCOUNTER — TELEPHONE (OUTPATIENT)
Dept: DERMATOLOGY | Facility: CLINIC | Age: 32
End: 2017-11-01

## 2017-11-01 NOTE — TELEPHONE ENCOUNTER
Called and left message for patient to call clinic back and ask for Mervat or Fernanda. We need to get her appointment from 11/21/17 with Dr. Harris scheduled for a 45 minute appointment. She can either reschedule for 9:00am for a 45 minute time slot or she can come in at 9:45am for a 45 minute time slot. Either one will be ok to change to. Please verify that the appointment is 45 minutes in length only on 11/21/17 with Dr. Harris at [place].

## 2017-11-01 NOTE — TELEPHONE ENCOUNTER
----- Message from Kirstin Harris MD sent at 11/1/2017  1:43 PM CDT -----  She will need 45 min,     TOMMY Curtis  ----- Message -----     From: Fernanda Spring CMA     Sent: 11/1/2017  11:09 AM       To: Kirstin Harris MD, Mervat Harris,  I was just looking at the schedule for our procedure day and it looks like Mervat accidentally scheduled the patient for a 15 minute procedure. We're going to give her a call to see if she can come in earlier that day, but how long are you thinking this patient will need for a time spot?    Thanks!  K

## 2017-11-02 ENCOUNTER — TELEPHONE (OUTPATIENT)
Dept: PEDIATRICS | Facility: CLINIC | Age: 32
End: 2017-11-02

## 2017-11-02 NOTE — TELEPHONE ENCOUNTER
Called patient, left voicemail for patient to return call to clinic to discuss appointment coming up on 11/21.  Fernanda Spring, CMA

## 2017-11-02 NOTE — TELEPHONE ENCOUNTER
Chanced patient's appointment to 11/21/17 at 10:00am. Please inform patient if she calls clinic back.  Fernanda Spring CMA

## 2017-11-10 NOTE — TELEPHONE ENCOUNTER
Called and left message regarding appointment on 11/21/17 at 10:00am. Advised patient to call back if she had any questions. Mervat Vila MA

## 2017-11-21 ENCOUNTER — OFFICE VISIT (OUTPATIENT)
Dept: DERMATOLOGY | Facility: CLINIC | Age: 32
End: 2017-11-21
Payer: COMMERCIAL

## 2017-11-21 VITALS
BODY MASS INDEX: 21.71 KG/M2 | HEART RATE: 68 BPM | OXYGEN SATURATION: 97 % | WEIGHT: 118 LBS | TEMPERATURE: 97.9 F | HEIGHT: 62 IN | DIASTOLIC BLOOD PRESSURE: 68 MMHG | SYSTOLIC BLOOD PRESSURE: 102 MMHG

## 2017-11-21 DIAGNOSIS — D48.5 NEOPLASM OF UNCERTAIN BEHAVIOR OF SKIN: Primary | ICD-10-CM

## 2017-11-21 PROCEDURE — 12041 INTMD RPR N-HF/GENIT 2.5CM/<: CPT | Performed by: DERMATOLOGY

## 2017-11-21 PROCEDURE — 88305 TISSUE EXAM BY PATHOLOGIST: CPT | Performed by: DERMATOLOGY

## 2017-11-21 PROCEDURE — 11422 EXC H-F-NK-SP B9+MARG 1.1-2: CPT | Mod: 51 | Performed by: DERMATOLOGY

## 2017-11-21 NOTE — MR AVS SNAPSHOT
After Visit Summary   11/21/2017    Halie Rendon    MRN: [id number]           Patient Information     Date Of Birth          1985        Visit Information        Provider Department      11/21/2017 10:00 AM Kirstin Harris MD [facility]        Today's Diagnoses     Neoplasm of uncertain behavior of skin    -  1      Care Instructions                    Pediatric Dermatology  [facility]  [address]  1st [place]  [address]  Phone: [phone]    Pediatric & Adult Dermatology  [facility]  [address] [place] [address]   2nd Floor  [facility] [address]  [phone]                  General information: Dr. Kirstin Harris is a board-certified dermatologist with subspecialty certification in pediatric dermatology.     Scheduling and Nurse Triage: Dr. Hraris sees pediatric patients on Mondays in [place] and adult and pediatric patients on Tuesdays in [place]. The remainder of the week she practices at the [facility]. Please call the above phone numbers to schedule or to talk to a nurse.     -For scheduling at the [place] or [place] locations, or to talk to the triage nurse please call the above phone number at the clinic where you were seen.     -For medication refills, please call your pharmacy.                     Follow-ups after your visit        Your next 10 appointments already scheduled     Dec 01, 2017 11:00 AM CST   Nurse Only with TEJAL RN   [facility] ([facility])    [address] [place] [address]  [address]  [facility] [address] [phone]              Who to contact     If you have questions or need follow up information about today's clinic visit or your schedule please contact [facility] directly at [phone].  Normal or non-critical lab and imaging results will be communicated to you by Emma  "letter or phone within 4 business days after the clinic has received the results. If you do not hear from us within 7 days, please contact the clinic through Light Magic or phone. If you have a critical or abnormal lab result, we will notify you by phone as soon as possible.  Submit refill requests through Light Magic or call your pharmacy and they will forward the refill request to us. Please allow 3 business days for your refill to be completed.          Additional Information About Your Visit        [employer] Information     Light Magic gives you secure access to your electronic health record. If you see a primary care provider, you can also send messages to your care team and make appointments. If you have questions, please call your primary care clinic.  If you do not have a primary care provider, please call [phone] and they will assist you.        Care EveryWhere ID     This is your Care EveryWhere ID. This could be used by other organizations to access your [place] medical records  [id number]        Your Vitals Were     Pulse Temperature Height Pulse Oximetry BMI (Body Mass Index)       68 97.9  F (36.6  C) (Oral) 5' 2\" (157.5 cm) 97% 21.58 kg/m2        Blood Pressure from Last 3 Encounters:   11/21/17 102/68   10/13/17 98/60   09/28/17 112/62    Weight from Last 3 Encounters:   11/21/17 118 lb (53.5 kg)   10/13/17 118 lb 8 oz (53.8 kg)   09/28/17 117 lb 12.8 oz (53.4 kg)              We Performed the Following     [address] - EXC BENIGN SKIN LESION SCLP/NCK/HNDS/FEET/GEN 1.1-2.0 CM     REPAIR INTERMED, WOUND NCK/HNDS/FEET/GEN <=2.5 CM     Surgical pathology exam        Primary Care Provider    Christofer De La Cruz MD       No address on file        Equal Access to Services     JO YBARRA : Delroy Maria, bere horne, mary kaalmada jeovany, nany crane. So [facility] [phone].    ATENCIÓN: Si habla español, tiene a loaiza disposición servicios gratuitos de asistencia " lingüísticaBert Tahkur al [phone].    We comply with applicable federal civil rights laws and [place] laws. We do not discriminate on the basis of race, color, national origin, age, disability, sex, sexual orientation, or gender identity.            Thank you!     Thank you for choosing [facility]  for your care. Our goal is always to provide you with excellent care. Hearing back from our patients is one way we can continue to improve our services. Please take a few minutes to complete the written survey that you may receive in the mail after your visit with us. Thank you!             Your Updated Medication List - Protect others around you: Learn how to safely use, store and throw away your medicines at www.disposemymeds.org.          This list is accurate as of: 11/21/17 12:03 PM.  Always use your most recent med list.                   Brand Name Dispense Instructions for use Diagnosis    cod liver oil Caps capsule           Multi-vitamin Tabs tablet      Take 1 tablet by mouth daily

## 2017-11-21 NOTE — PATIENT INSTRUCTIONS
Pediatric Dermatology  [facility]  [address]  1st [place]  [address]  Phone: [phone]    Pediatric & Adult Dermatology  [facility]  [address] [facility]   2nd Floor  [facility] [address]  [phone]                  General information: Dr. Kirstin Harris is a board-certified dermatologist with subspecialty certification in pediatric dermatology.     Scheduling and Nurse Triage: Dr. Harris sees pediatric patients on Mondays in [place] and adult and pediatric patients on Tuesdays in [place]. The remainder of the week she practices at the [facility]. Please call the above phone numbers to schedule or to talk to a nurse.     -For scheduling at the [place] or [place] locations, or to talk to the triage nurse please call the above phone number at the clinic where you were seen.     -For medication refills, please call your pharmacy.

## 2017-11-21 NOTE — NURSING NOTE
"Chief Complaint   Patient presents with     Procedure     Excision       Initial /68  Pulse 68  Temp 97.9  F (36.6  C) (Oral)  Ht 1.575 m (5' 2\")  Wt 53.5 kg (118 lb)  SpO2 97%  BMI 21.58 kg/m2 Estimated body mass index is 21.58 kg/(m^2) as calculated from the following:    Height as of this encounter: 1.575 m (5' 2\").    Weight as of this encounter: 53.5 kg (118 lb).  Medication Reconciliation: complete     Jaclyn Gonzalez CMA        "

## 2017-11-21 NOTE — LETTER
2017      RE: Halie Rendon  [address] [place] [address]                Dermatologic Procedure Note        Patient Name:  Halie Rendon  Patient :  1985  Medical Record #: [id number]  Date of Operation: 2017        SURGEON:  Kirstin Harris MD    ASSISTANT:  Mervat Vila    PREOPERATIVE DIAGNOSIS:  Excision of cyst    POSTOPERATIVE DIAGNOSIS:  Same    INDICATION: Excision of cyst, past history of inflammation.     PROCEDURE PERFORMED:  1. Excise benign lesion  2. Intermediate closure neck    PROCEDURE:    PROCEDURE:  After discussion of risks and benefits of the procedure including the presence of a scar, bleeding and infection following the procedure, written consent was obtained from the patient.  The patient was placed in the supine position and the lesion on the R lateral neck was delineated by a marking pen. Local anesthesia included Xylocaine 1% with epinephrine 1:200,000 for a total of 5 ml. The area was cleansed with PCMX and draped in the usual sterile fashion.    Excision was performed using a 15 blade with 0 mm margins on all sides of the lesion. Excision was performed down to subcutaneous fat. Specimen was sent in formalin to pathology.    The wound was closed with 1 4.0  Vicryl deep buried sutures. Wound edges were approximated with 4  Interrupted 4-0 prolene sutures.     Lesion size: 15 mm  Margins: 0 mm  Final wound length: 1.1 cm    The wound was dressed with Vaseline and a bandage.  Patient was advised of wound care and healing and instructed to contact us with any concerns. Suture removal was planned in 10 days.  Limitation of activity was discussed in detail.      There were no complications to the procedure or abnormal findings.    Kirstin Harris MD   of Dermatology            Kirstin Harris MD

## 2017-11-21 NOTE — PROGRESS NOTES
Dermatologic Procedure Note        Patient Name:  Halie Rendon  Patient :  1985  Medical Record #: [id number]  Date of Operation: 2017        SURGEON:  Kirstin Harris MD    ASSISTANT:  Mervat Vila    PREOPERATIVE DIAGNOSIS:  Excision of cyst    POSTOPERATIVE DIAGNOSIS:  Same    INDICATION: Excision of cyst, past history of inflammation.     PROCEDURE PERFORMED:  1. Excise benign lesion  2. Intermediate closure neck    PROCEDURE:    PROCEDURE:  After discussion of risks and benefits of the procedure including the presence of a scar, bleeding and infection following the procedure, written consent was obtained from the patient.  The patient was placed in the supine position and the lesion on the R lateral neck was delineated by a marking pen. Local anesthesia included Xylocaine 1% with epinephrine 1:200,000 for a total of 5 ml. The area was cleansed with PCMX and draped in the usual sterile fashion.    Excision was performed using a 15 blade with 0 mm margins on all sides of the lesion. Excision was performed down to subcutaneous fat. Specimen was sent in formalin to pathology.    The wound was closed with 1 4.0  Vicryl deep buried sutures. Wound edges were approximated with 4  Interrupted 4-0 prolene sutures.     Lesion size: 15 mm  Margins: 0 mm  Final wound length: 1.1 cm    The wound was dressed with Vaseline and a bandage.  Patient was advised of wound care and healing and instructed to contact us with any concerns. Suture removal was planned in 10 days.  Limitation of activity was discussed in detail.      There were no complications to the procedure or abnormal findings.    Kirstin Harris MD   of Dermatology

## 2017-11-21 NOTE — LETTER
Date:November 22, 2017      Patient was self referred, no letter generated. Do not send.        [facility] Information

## 2017-11-27 LAB — COPATH REPORT: NORMAL

## 2017-12-01 ENCOUNTER — ALLIED HEALTH/NURSE VISIT (OUTPATIENT)
Dept: NURSING | Facility: CLINIC | Age: 32
End: 2017-12-01
Payer: COMMERCIAL

## 2017-12-01 DIAGNOSIS — Z48.02 VISIT FOR SUTURE REMOVAL: Primary | ICD-10-CM

## 2017-12-01 PROCEDURE — 99207 ZZC NO CHARGE NURSE ONLY: CPT

## 2017-12-01 NOTE — MR AVS SNAPSHOT
After Visit Summary   12/1/2017    Halie Rendon    MRN: [id number]           Patient Information     Date Of Birth          1985        Visit Information        Provider Department      12/1/2017 11:00 AM EA RN [facility]        Today's Diagnoses     Visit for suture removal    -  1       Follow-ups after your visit        Who to contact     If you have questions or need follow up information about today's clinic visit or your schedule please contact [facility] directly at [phone].  Normal or non-critical lab and imaging results will be communicated to you by MyChart, letter or phone within 4 business days after the clinic has received the results. If you do not hear from us within 7 days, please contact the clinic through Alverixhart or phone. If you have a critical or abnormal lab result, we will notify you by phone as soon as possible.  Submit refill requests through [employer] or call your pharmacy and they will forward the refill request to us. Please allow 3 business days for your refill to be completed.          Additional Information About Your Visit        MyChart Information     [employer] gives you secure access to your electronic health record. If you see a primary care provider, you can also send messages to your care team and make appointments. If you have questions, please call your primary care clinic.  If you do not have a primary care provider, please call [phone] and they will assist you.        Care EveryWhere ID     This is your Care EveryWhere ID. This could be used by other organizations to access your [place] medical records  [id number]         Blood Pressure from Last 3 Encounters:   11/21/17 102/68   10/13/17 98/60   09/28/17 112/62    Weight from Last 3 Encounters:   11/21/17 118 lb (53.5 kg)   10/13/17 118 lb 8 oz (53.8 kg)   09/28/17 117 lb 12.8 oz (53.4 kg)              Today, you had the following     No orders found for display        Primary Care Provider    Christofer De La Cruz MD       No address on file        Equal Access to Services     LEONARD CALIECHO : Hadii aad ku hadtressaronda Maria, waofeda ozzie, mary louta danieljordansadaf thayer, nany rodshoncheri crane. So [facility] [phone].    ATENCIÓN: Si habla español, tiene a loaiza disposición servicios gratuitos de asistencia lingüística. Llame al [phone].    We comply with applicable federal civil rights laws and [place] laws. We do not discriminate on the basis of race, color, national origin, age, disability, sex, sexual orientation, or gender identity.            Thank you!     Thank you for choosing [facility]  for your care. Our goal is always to provide you with excellent care. Hearing back from our patients is one way we can continue to improve our services. Please take a few minutes to complete the written survey that you may receive in the mail after your visit with us. Thank you!             Your Updated Medication List - Protect others around you: Learn how to safely use, store and throw away your medicines at www.disposemymeds.org.          This list is accurate as of: 12/1/17 11:48 AM.  Always use your most recent med list.                   Brand Name Dispense Instructions for use Diagnosis    cod liver oil Caps capsule           Multi-vitamin Tabs tablet      Take 1 tablet by mouth daily

## 2017-12-01 NOTE — PROGRESS NOTES
Halie presents to the clinic today for  removal of sutures.  The patient has had the sutures in place for 10 days.    There has been no history of infection or drainage.    O: 4 sutures are seen located on the right side of neck.  The wound is healing well with no signs of infection.    Tetanus status is up to date.    A: Suture removal.    P:  All sutures were easily removed today.  Routine wound care discussed.  The patient will follow up as needed.    Giselle Radford RN   Encounter Handled by Nurse Triage

## 2018-01-16 ENCOUNTER — PRENATAL OFFICE VISIT (OUTPATIENT)
Dept: NURSING | Facility: CLINIC | Age: 33
End: 2018-01-16
Payer: COMMERCIAL

## 2018-01-16 VITALS
BODY MASS INDEX: 21.71 KG/M2 | HEART RATE: 76 BPM | HEIGHT: 62 IN | DIASTOLIC BLOOD PRESSURE: 46 MMHG | WEIGHT: 118 LBS | SYSTOLIC BLOOD PRESSURE: 84 MMHG

## 2018-01-16 DIAGNOSIS — Z87.59 H/O CALCULUS OF KIDNEY DURING PREGNANCY: ICD-10-CM

## 2018-01-16 DIAGNOSIS — Z87.442 H/O CALCULUS OF KIDNEY DURING PREGNANCY: ICD-10-CM

## 2018-01-16 DIAGNOSIS — O09.291 H/O GESTATIONAL DIABETES IN PRIOR PREGNANCY, CURRENTLY PREGNANT, FIRST TRIMESTER: ICD-10-CM

## 2018-01-16 DIAGNOSIS — Z86.32 H/O GESTATIONAL DIABETES IN PRIOR PREGNANCY, CURRENTLY PREGNANT, FIRST TRIMESTER: ICD-10-CM

## 2018-01-16 DIAGNOSIS — O09.621 HIGH-RISK PREGNANCY, YOUNG MULTIGRAVIDA IN FIRST TRIMESTER: Primary | ICD-10-CM

## 2018-01-16 PROBLEM — O09.629 HIGH-RISK PREGNANCY, YOUNG MULTIGRAVIDA: Status: ACTIVE | Noted: 2018-01-16

## 2018-01-16 LAB
ABO + RH BLD: NORMAL
ABO + RH BLD: NORMAL
BLD GP AB SCN SERPL QL: NORMAL
BLOOD BANK CMNT PATIENT-IMP: NORMAL
ERYTHROCYTE [DISTWIDTH] IN BLOOD BY AUTOMATED COUNT: 11.5 % (ref 10–15)
HBA1C MFR BLD: 5 % (ref 4.3–6)
HCT VFR BLD AUTO: 37.1 % (ref 35–47)
HGB BLD-MCNC: 13.6 G/DL (ref 11.7–15.7)
MCH RBC QN AUTO: 33.3 PG (ref 26.5–33)
MCHC RBC AUTO-ENTMCNC: 36.7 G/DL (ref 31.5–36.5)
MCV RBC AUTO: 91 FL (ref 78–100)
PLATELET # BLD AUTO: 284 10E9/L (ref 150–450)
RBC # BLD AUTO: 4.09 10E12/L (ref 3.8–5.2)
SPECIMEN EXP DATE BLD: NORMAL
WBC # BLD AUTO: 9.2 10E9/L (ref 4–11)

## 2018-01-16 PROCEDURE — 36415 COLL VENOUS BLD VENIPUNCTURE: CPT | Performed by: OBSTETRICS & GYNECOLOGY

## 2018-01-16 PROCEDURE — 87389 HIV-1 AG W/HIV-1&-2 AB AG IA: CPT | Performed by: OBSTETRICS & GYNECOLOGY

## 2018-01-16 PROCEDURE — 87340 HEPATITIS B SURFACE AG IA: CPT | Performed by: OBSTETRICS & GYNECOLOGY

## 2018-01-16 PROCEDURE — 86780 TREPONEMA PALLIDUM: CPT | Performed by: OBSTETRICS & GYNECOLOGY

## 2018-01-16 PROCEDURE — 86850 RBC ANTIBODY SCREEN: CPT | Performed by: OBSTETRICS & GYNECOLOGY

## 2018-01-16 PROCEDURE — 87086 URINE CULTURE/COLONY COUNT: CPT | Performed by: OBSTETRICS & GYNECOLOGY

## 2018-01-16 PROCEDURE — 86762 RUBELLA ANTIBODY: CPT | Performed by: OBSTETRICS & GYNECOLOGY

## 2018-01-16 PROCEDURE — 86901 BLOOD TYPING SEROLOGIC RH(D): CPT | Performed by: OBSTETRICS & GYNECOLOGY

## 2018-01-16 PROCEDURE — 83036 HEMOGLOBIN GLYCOSYLATED A1C: CPT | Performed by: OBSTETRICS & GYNECOLOGY

## 2018-01-16 PROCEDURE — 86900 BLOOD TYPING SEROLOGIC ABO: CPT | Performed by: OBSTETRICS & GYNECOLOGY

## 2018-01-16 PROCEDURE — 85027 COMPLETE CBC AUTOMATED: CPT | Performed by: OBSTETRICS & GYNECOLOGY

## 2018-01-16 PROCEDURE — 99207 ZZC NO CHARGE NURSE ONLY: CPT

## 2018-01-16 RX ORDER — PRENATAL VIT/IRON FUM/FOLIC AC 27MG-0.8MG
1 TABLET ORAL DAILY
Qty: 100 TABLET | Refills: 3
Start: 2018-01-16 | End: 2019-09-11

## 2018-01-16 NOTE — MR AVS SNAPSHOT
After Visit Summary   1/16/2018    Halie Rendon    MRN: [id number]           Patient Information     Date Of Birth          1985        Visit Information        Provider Department      1/16/2018 10:30 AM EA OB NURSE [facility]        Today's Diagnoses     High-risk pregnancy, young multigravida in first trimester    -  1    H/O gestational diabetes in prior pregnancy, currently pregnant, first trimester           Follow-ups after your visit        Your next 10 appointments already scheduled     Jan 25, 2018 10:30 AM CST   (Arrive by 10:15 AM)   Ultrasound with OXUS1   [facility]    [address] [id number] [phone]            Feb 14, 2018  5:30 PM CST   New Prenatal with Domenic Timmons MD   [facility] ([facility])    [address]  [address]  [facility] [address] [phone]              Future tests that were ordered for you today     Open Future Orders        Priority Expected Expires Ordered    US OB < 14 weeks, single,  for dating (BVR001) Routine  4/16/2018 1/16/2018            Who to contact     If you have questions or need follow up information about today's clinic visit or your schedule please contact [facility] directly at [phone].  Normal or non-critical lab and imaging results will be communicated to you by MyChart, letter or phone within 4 business days after the clinic has received the results. If you do not hear from us within 7 days, please contact the clinic through MyChart or phone. If you have a critical or abnormal lab result, we will notify you by phone as soon as possible.  Submit refill requests through ReDigi or call your pharmacy and they will forward the refill request to us. Please allow 3 business days for your refill to be completed.          Additional Information About Your Visit        Tutorhart  "Information     Emma gives you secure access to your electronic health record. If you see a primary care provider, you can also send messages to your care team and make appointments. If you have questions, please call your primary care clinic.  If you do not have a primary care provider, please call [phone] and they will assist you.        Care EveryWhere ID     This is your Care EveryWhere ID. This could be used by other organizations to access your [place] medical records  [id number]        Your Vitals Were     Pulse Height Last Period BMI (Body Mass Index)          76 5' 2.25\" (1.581 m) 11/25/2017 21.41 kg/m2         Blood Pressure from Last 3 Encounters:   01/16/18 (!) 84/46   11/21/17 102/68   10/13/17 98/60    Weight from Last 3 Encounters:   01/16/18 118 lb (53.5 kg)   11/21/17 118 lb (53.5 kg)   10/13/17 118 lb 8 oz (53.8 kg)              We Performed the Following     ABO/RH Type and Screen     Anti Treponema     CBC with Platelets     Hemoglobin A1c     Hepatitis B surface antigen     HIV Antigen Antibody Combo     Rubella Antibody IgG Quantitative     Urine Culture Aerobic Bacterial          Today's Medication Changes          These changes are accurate as of: 1/16/18 12:04 PM.  If you have any questions, ask your nurse or doctor.               Start taking these medicines.        Dose/Directions    prenatal multivitamin plus iron 27-0.8 MG Tabs per tablet   Used for:  High-risk pregnancy, young multigravida in first trimester        Dose:  1 tablet   Take 1 tablet by mouth daily   Quantity:  100 tablet   Refills:  3         Stop taking these medicines if you haven't already. Please contact your care team if you have questions.     Multi-vitamin Tabs tablet                Where to get your medicines      Some of these will need a paper prescription and others can be bought over the counter.  Ask your nurse if you have questions.     You don't need a prescription for these medications     " prenatal multivitamin plus iron 27-0.8 MG Tabs per tablet                Primary Care Provider Office Phone # Fax #    Mary Andrade [phone] [address] [place] [address]        Equal Access to Services     [facility] : Hadii aad ku hadtressao Soomaali, waaxda luqadaha, qaybta kaalmada adeegyada, waxjaney idiin haylilin maurizioharris wood laandiralph rosa maria. So [facility] [phone].    ATENCIÓN: Si habla español, tiene a loaiza disposición servicios gratuitos de asistencia lingüística. Llame al [phone].    We comply with applicable federal civil rights laws and [place] laws. We do not discriminate on the basis of race, color, national origin, age, disability, sex, sexual orientation, or gender identity.            Thank you!     Thank you for choosing [facility]  for your care. Our goal is always to provide you with excellent care. Hearing back from our patients is one way we can continue to improve our services. Please take a few minutes to complete the written survey that you may receive in the mail after your visit with us. Thank you!             Your Updated Medication List - Protect others around you: Learn how to safely use, store and throw away your medicines at www.disposemymeds.org.          This list is accurate as of: 1/16/18 12:04 PM.  Always use your most recent med list.                   Brand Name Dispense Instructions for use Diagnosis    prenatal multivitamin plus iron 27-0.8 MG Tabs per tablet     100 tablet    Take 1 tablet by mouth daily    High-risk pregnancy, young multigravida in first trimester

## 2018-01-16 NOTE — PROGRESS NOTES
"Chief Complaint   Patient presents with     Prenatal Care     New Prenatal Nurse Visit   7w3d  Estimated Date of Delivery: Sep 1, 2018      Initial BP (!) 84/46 (BP Location: Right arm, Cuff Size: Adult Regular)  Pulse 76  Ht 5' 2.25\" (1.581 m)  Wt 118 lb (53.5 kg)  LMP 11/25/2017  BMI 21.41 kg/m2 Estimated body mass index is 21.41 kg/(m^2) as calculated from the following:    Height as of this encounter: 5' 2.25\" (1.581 m).    Weight as of this encounter: 118 lb (53.5 kg).  Medication Reconciliation: complete      Prenatal book and folder (containing standard educational hand-outs and brochures) given to patient. Information in folder reviewed. Questions answered.     Discussed and gave written information on options available for 1st and 2nd trimester screening, CVS, amniocentesis, AFP, and QUAD screen plus optimal time-frame for testing. Brochure given on optional screening available to determine Cystic Fibrosis carrier status. Pt instructed to check with insurance regarding coverage of these optional tests. Pt advised to call back if she desires testing or has any questions or concerns.    Prenatal labs obtained. New prenatal visit scheduled on 2/14/18 with Dr. Timmons.  Gabi Connell RN          "

## 2018-01-17 PROBLEM — Z28.39 RUBELLA NON-IMMUNE STATUS, ANTEPARTUM: Status: ACTIVE | Noted: 2018-01-17

## 2018-01-17 PROBLEM — O09.899 RUBELLA NON-IMMUNE STATUS, ANTEPARTUM: Status: ACTIVE | Noted: 2018-01-17

## 2018-01-17 LAB
BACTERIA SPEC CULT: NORMAL
HBV SURFACE AG SERPL QL IA: NONREACTIVE
HIV 1+2 AB+HIV1 P24 AG SERPL QL IA: NONREACTIVE
RUBV IGG SERPL IA-ACNC: 7 IU/ML
SPECIMEN SOURCE: NORMAL
T PALLIDUM IGG+IGM SER QL: NEGATIVE

## 2018-01-25 DIAGNOSIS — Z87.59 H/O CALCULUS OF KIDNEY DURING PREGNANCY: ICD-10-CM

## 2018-01-25 DIAGNOSIS — Z87.442 H/O CALCULUS OF KIDNEY DURING PREGNANCY: ICD-10-CM

## 2018-01-25 DIAGNOSIS — O09.621 HIGH-RISK PREGNANCY, YOUNG MULTIGRAVIDA IN FIRST TRIMESTER: ICD-10-CM

## 2018-01-25 PROCEDURE — 76770 US EXAM ABDO BACK WALL COMP: CPT

## 2018-02-14 ENCOUNTER — PRENATAL OFFICE VISIT (OUTPATIENT)
Dept: OBGYN | Facility: CLINIC | Age: 33
End: 2018-02-14
Payer: COMMERCIAL

## 2018-02-14 VITALS
WEIGHT: 119.7 LBS | BODY MASS INDEX: 21.72 KG/M2 | DIASTOLIC BLOOD PRESSURE: 58 MMHG | SYSTOLIC BLOOD PRESSURE: 104 MMHG | HEART RATE: 80 BPM

## 2018-02-14 DIAGNOSIS — O09.621 HIGH-RISK PREGNANCY, YOUNG MULTIGRAVIDA IN FIRST TRIMESTER: Primary | ICD-10-CM

## 2018-02-14 PROBLEM — Z87.442 PERSONAL HISTORY OF RENAL CALCULI: Status: ACTIVE | Noted: 2018-02-14

## 2018-02-14 PROBLEM — Z87.768 STATUS POST CLUB FOOT CORRECTION AT BIRTH: Status: ACTIVE | Noted: 2018-02-14

## 2018-02-14 PROBLEM — Z82.79 FAMILY HISTORY OF SPINA BIFIDA: Status: ACTIVE | Noted: 2018-02-14

## 2018-02-14 PROBLEM — Z86.59 HISTORY OF POSTPARTUM DEPRESSION, CURRENTLY PREGNANT: Status: ACTIVE | Noted: 2018-02-14

## 2018-02-14 PROBLEM — O99.891 HISTORY OF POSTPARTUM DEPRESSION, CURRENTLY PREGNANT: Status: ACTIVE | Noted: 2018-02-14

## 2018-02-14 PROCEDURE — 99207 ZZC FIRST OB VISIT: CPT | Performed by: OBSTETRICS & GYNECOLOGY

## 2018-02-14 PROCEDURE — 87491 CHLMYD TRACH DNA AMP PROBE: CPT | Performed by: OBSTETRICS & GYNECOLOGY

## 2018-02-14 PROCEDURE — 87591 N.GONORRHOEAE DNA AMP PROB: CPT | Performed by: OBSTETRICS & GYNECOLOGY

## 2018-02-14 NOTE — MR AVS SNAPSHOT
After Visit Summary   2/14/2018    Halie Rendon    MRN: [id number]           Patient Information     Date Of Birth          1985        Visit Information        Provider Department      2/14/2018 5:30 PM Domenic Timmons MD [facility] [place]        Today's Diagnoses     High-risk pregnancy, young multigravida in first trimester    -  1       Follow-ups after your visit        Follow-up notes from your care team     Return in about 4 weeks (around 3/14/2018).      Your next 10 appointments already scheduled     Mar 14, 2018  3:00 PM CDT   ESTABLISHED PRENATAL with Domeinc Timmons MD   [facility] [place] ([facility])    [address] [place] [address]  [address]  [facility] [address] [phone]              Who to contact     If you have questions or need follow up information about today's clinic visit or your schedule please contact [facility] directly at [phone].  Normal or non-critical lab and imaging results will be communicated to you by Impress Software Solutionshart, letter or phone within 4 business days after the clinic has received the results. If you do not hear from us within 7 days, please contact the clinic through BioMetric Solutiont or phone. If you have a critical or abnormal lab result, we will notify you by phone as soon as possible.  Submit refill requests through DigitalMR or call your pharmacy and they will forward the refill request to us. Please allow 3 business days for your refill to be completed.          Additional Information About Your Visit        MyChart Information     DigitalMR gives you secure access to your electronic health record. If you see a primary care provider, you can also send messages to your care team and make appointments. If you have questions, please call your primary care clinic.  If you do not have a primary care provider, please call [phone] and they will assist you.        Care EveryWhere ID     This is your Care EveryWhere ID.  This could be used by other organizations to access your [place] medical records  [id number]        Your Vitals Were     Pulse Last Period BMI (Body Mass Index)             80 11/25/2017 21.72 kg/m2          Blood Pressure from Last 3 Encounters:   02/14/18 104/58   01/16/18 (!) 84/46   11/21/17 102/68    Weight from Last 3 Encounters:   02/14/18 119 lb 11.2 oz (54.3 kg)   01/16/18 118 lb (53.5 kg)   11/21/17 118 lb (53.5 kg)              We Performed the Following     CHLAMYDIA TRACHOMATIS PCR     NEISSERIA GONORRHOEA PCR        Primary Care Provider Office Phone # Fax #    Mary nAdrade [phone] [address] [place] [address]        Equal Access to Services     [facility]: Hadii melania morales hadasho Soomaali, waaxda luqadaha, qaybta kaalmada adeegyada, nany murcia . So [facility] [phone].    ATENCIÓN: Si habla español, tiene a loaiza disposición servicios gratuitos de asistencia lingüística. Blaze al [phone].    We comply with applicable federal civil rights laws and [place] laws. We do not discriminate on the basis of race, color, national origin, age, disability, sex, sexual orientation, or gender identity.            Thank you!     [facility]. Our goal is always to provide you with excellent care. Hearing back from our patients is one way we can continue to improve our services. Please take a few minutes to complete the written survey that you may receive in the mail after your visit with us. Thank you!             Your Updated Medication List - Protect others around you: Learn how to safely use, store and throw away your medicines at www.disposemymeds.org.          This list is accurate as of 2/14/18 11:59 PM.  Always use your most recent med list.                   Brand Name Dispense Instructions for use Diagnosis    prenatal multivitamin plus iron 27-0.8 MG Tabs per tablet     100  tablet    Take 1 tablet by mouth daily    High-risk pregnancy, young multigravida in first trimester

## 2018-02-14 NOTE — NURSING NOTE
"Chief Complaint   Patient presents with     Prenatal Care   11w4d  Discuss renal u/s and CBC results  Here today with spouse    initial /58  Pulse 80  Wt 119 lb 11.2 oz (54.3 kg)  LMP 11/25/2017  BMI 21.72 kg/m2 Estimated body mass index is 21.72 kg/(m^2) as calculated from the following:    Height as of 1/16/18: 5' 2.25\" (1.581 m).    Weight as of this encounter: 119 lb 11.2 oz (54.3 kg).  BP completed using cuff size regular.  Mandie Bruner CMA    "

## 2018-02-16 LAB
C TRACH DNA SPEC QL NAA+PROBE: NEGATIVE
N GONORRHOEA DNA SPEC QL NAA+PROBE: NEGATIVE
SPECIMEN SOURCE: NORMAL
SPECIMEN SOURCE: NORMAL

## 2018-02-17 NOTE — PROGRESS NOTES
SUBJECTIVE:  Halie Rendon is an 33 year old  P1 woman who presents for NOB visit      Past Medical History:   Diagnosis Date     Gestational diabetes mellitus     diet controlled     Nephrolithiasis     has a kidney stone     Varicella vaccination     documented immunity     Past Surgical History:   Procedure Laterality Date      SECTION N/A 10/2/2014    Procedure:  SECTION;  Surgeon: Domenic Timmons MD;  Location: [facility]     DENTAL SURGERY       Current Outpatient Prescriptions   Medication     Prenatal Vit-Fe Fumarate-FA (PRENATAL MULTIVITAMIN PLUS IRON) 27-0.8 MG TABS per tablet     No current facility-administered medications for this visit.      No Known Allergies  Social History   Substance Use Topics     Smoking status: Former Smoker     Smokeless tobacco: Never Used      Comment: rare cigarette use     Alcohol use No      Comment: rare       Review of Systems  CONSTITUTIONAL:NEGATIVE  EYES: NEGATIVE  ENT/MOUTH: NEGATIVE  RESP: NEGATIVE  CV: NEGATIVE  GI: NEGATIVE  : NEGATIVE  MUSCULOSKELATAL: NEGATIVE  INTEGUMENTARY/SKIN: NEGATIVE  BREAST: NEGATIVE  NEURO: NEGATIVE  ENDOCRINE: NEGATIVE  HEME/ALLERGY/IMMUNE: NEGATIVE  PSYCHIATRIC: NEGATIVE    OBJECTIVE:  /58  Pulse 80  Wt 119 lb 11.2 oz (54.3 kg)  LMP 2017  BMI 21.72 kg/m2   EXAM:  GENERAL APPEARANCE: healthy, alert and no distress  BREAST: normal without masses, tenderness or nipple discharge and no palpable axillary masses or adenopathy  ABDOMEN: soft, nontender, without hepatosplenomegaly or masses    Pelvic Exam:  Urethra; negative  Vulva: No external lesions, normal hair distribution, no adenopathy  Vagina: Moist, pink, no abnormal discharge, well rugated, no lesions  Cervix: Parous, smooth, pink, no visible lesions  Uterus: 11 weeks size, anteverted, non-tender, mobile  Ovaries: No mass, non-tender, mobile      ASSESSMENT:  IUP at 11 4/7 weeks      -offered First Trimester Screen/Progenity/CF  testing      -previous C/S; discussed TOLAC/repeat    PLAN:  (O09.621) High-risk pregnancy, young multigravida in first trimester  (primary encounter diagnosis)  Plan: NEISSERIA GONORRHOEA PCR, CHLAMYDIA TRACHOMATIS        PCR               Health Maintenance   Topic Date Due     PAP SCREENING Q3 YR (SYSTEM ASSIGNED)  03/21/2017     MATERNAL SCREENING  03/10/2018     TETANUS IMMUNIZATION (SYSTEM ASSIGNED)  07/02/2024     INFLUENZA VACCINE (SYSTEM ASSIGNED)  Completed

## 2018-02-18 ENCOUNTER — HEALTH MAINTENANCE LETTER (OUTPATIENT)
Age: 33
End: 2018-02-18

## 2018-03-14 ENCOUNTER — PRENATAL OFFICE VISIT (OUTPATIENT)
Dept: OBGYN | Facility: CLINIC | Age: 33
End: 2018-03-14
Payer: COMMERCIAL

## 2018-03-14 VITALS
DIASTOLIC BLOOD PRESSURE: 56 MMHG | SYSTOLIC BLOOD PRESSURE: 102 MMHG | WEIGHT: 122 LBS | BODY MASS INDEX: 22.14 KG/M2 | HEART RATE: 84 BPM

## 2018-03-14 DIAGNOSIS — O09.622 HIGH-RISK PREGNANCY, YOUNG MULTIGRAVIDA IN SECOND TRIMESTER: Primary | ICD-10-CM

## 2018-03-14 DIAGNOSIS — Z82.79 FAMILY HISTORY OF SPINA BIFIDA: ICD-10-CM

## 2018-03-14 DIAGNOSIS — Z87.768 STATUS POST CLUB FOOT CORRECTION AT BIRTH: ICD-10-CM

## 2018-03-14 PROBLEM — Z86.32 HISTORY OF GESTATIONAL DIABETES: Status: ACTIVE | Noted: 2018-03-14

## 2018-03-14 PROCEDURE — 99207 ZZC COMPLICATED OB VISIT: CPT | Performed by: OBSTETRICS & GYNECOLOGY

## 2018-03-14 NOTE — PROGRESS NOTES
IUP at 15 4/7 weeks     -ultrasound at [facility], order placed     -will plan glucose screen at 20 weeks (history of gestational DM)  Likely planning repeat C/S

## 2018-03-14 NOTE — NURSING NOTE
"Chief Complaint   Patient presents with     Prenatal Care     sleeping positions - light headed and dizziness - SOB - 20 week US through [facility]?  Is it okay to take melitonin     15w4d    Initial /56 (BP Location: Right arm, Patient Position: Chair, Cuff Size: Adult Regular)  Pulse 84  Wt 122 lb (55.3 kg)  LMP 11/25/2017  BMI 22.14 kg/m2 Estimated body mass index is 22.14 kg/(m^2) as calculated from the following:    Height as of 1/16/18: 5' 2.25\" (1.581 m).    Weight as of this encounter: 122 lb (55.3 kg).  Medication Reconciliation: complete     Nurse assisted visit.  Giselle Mckinney MA.      "

## 2018-03-14 NOTE — MR AVS SNAPSHOT
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    >> If requesting Lung Maturity Amnio:  If results indicate fetal lung maturity, induction or C/S is recommended within 36 hours.  Please schedule accordingly.     Dear Patient:   Please be aware that coverage of these services is subject to the terms and limitations of your health insurance plan.  Call member services at your health plan with any benefit or coverage questions.      Please bring the following to your appointment:    >>  Any x-rays, CTs or MRIs which have been performed.  Contact the facility where they were done to arrange for  prior to your scheduled appointment.  Any new CT, MRI or other procedures ordered by your specialist must be performed at a [place] facility or coordinated by your clinic's referral office.  >>  List of current medications   >>  This referral request   >>  Any documents/labs given to you for this referral                  Follow-up notes from your care team     Return in about 4 weeks (around 4/11/2018).      Your next 10 appointments already scheduled     Apr 10, 2018  4:15 PM CDT   ESTABLISHED PRENATAL with Domenic Timmons MD   [facility] ([facility]    [address]  [address]  [place] [address] [phone]              Who to contact     If you have questions or need follow up information about today's clinic visit or your schedule please contact [facility] directly at [phone].  Normal or non-critical lab and imaging results will be communicated to you by MyChart, letter or phone within 4 business days after the clinic has received the results. If you do not hear from us within 7 days, please contact the clinic through MyChart or phone. If you have a critical or abnormal lab result, we will notify you by phone as soon as possible.  Submit refill requests through QlikTech or call your pharmacy and they will forward the refill request to us. Please allow 3 business days for your refill to be completed.          Additional Information About Your Visit        Doctolibhart Information     QlikTech gives you secure access to your electronic health record. If you see a primary care provider, you can also send messages to your care team and make appointments. If you have questions, please call your primary care clinic.  If you do not have a primary care provider, please call [phone] and they will assist you.        Care EveryWhere ID     This is your Care EveryWhere ID. This could be used by other organizations to access your [place] medical records  [id number]        Your Vitals Were     Pulse Last Period BMI (Body Mass Index)             84 11/25/2017 22.14 kg/m2          Blood Pressure from Last 3 Encounters:   03/14/18 102/56   02/14/18 104/58   01/16/18 (!) 84/46    Weight from Last 3 Encounters:   03/14/18 122 lb (55.3 kg)   02/14/18 119 lb 11.2 oz (54.3 kg)   01/16/18 118 lb (53.5 kg)              We Performed the Following     MAT FETAL MED CTR REFERRAL-PREGNANCY        Primary Care Provider Office Phone # Fax #    Mary Adnrade [phone] [address] [place] [address]        Equal  Access to Services     [facility]: Hadii aad ku hadtressaronda Arunalisa, waofeda luqadaha, qaybta kajordannany khan. So [facility] [phone].    ATENCIÓN: Si habla español, tiene a loaiza disposición servicios gratuitos de asistencia lingüística. Llame al [phone].    We comply with applicable federal civil rights laws and [place] laws. We do not discriminate on the basis of race, color, national origin, age, disability, sex, sexual orientation, or gender identity.            Thank you!     Thank you for choosing [facility]  for your care. Our goal is always to provide you with excellent care. Hearing back from our patients is one way we can continue to improve our services. Please take a few minutes to complete the written survey that you may receive in the mail after your visit with us. Thank you!             Your Updated Medication List - Protect others around you: Learn how to safely use, store and throw away your medicines at www.disposemymeds.org.          This list is accurate as of 3/14/18  3:20 PM.  Always use your most recent med list.                   Brand Name Dispense Instructions for use Diagnosis    prenatal multivitamin plus iron 27-0.8 MG Tabs per tablet     100 tablet    Take 1 tablet by mouth daily    High-risk pregnancy, young multigravida in first trimester

## 2018-03-30 ENCOUNTER — PRE VISIT (OUTPATIENT)
Dept: MATERNAL FETAL MEDICINE | Facility: CLINIC | Age: 33
End: 2018-03-30

## 2018-04-03 ENCOUNTER — OFFICE VISIT (OUTPATIENT)
Dept: MATERNAL FETAL MEDICINE | Facility: CLINIC | Age: 33
End: 2018-04-03
Attending: OBSTETRICS & GYNECOLOGY
Payer: COMMERCIAL

## 2018-04-03 ENCOUNTER — HOSPITAL ENCOUNTER (OUTPATIENT)
Dept: ULTRASOUND IMAGING | Facility: CLINIC | Age: 33
Discharge: HOME | End: 2018-04-03
Attending: OBSTETRICS & GYNECOLOGY | Admitting: OBSTETRICS & GYNECOLOGY
Payer: COMMERCIAL

## 2018-04-03 DIAGNOSIS — Z82.79 FAMILY HISTORY OF BIRTH DEFECTS: Primary | ICD-10-CM

## 2018-04-03 DIAGNOSIS — O26.90 PREGNANCY RELATED CONDITION, ANTEPARTUM: ICD-10-CM

## 2018-04-03 PROCEDURE — 76811 OB US DETAILED SNGL FETUS: CPT

## 2018-04-03 PROCEDURE — 96040 ZZH GENETIC COUNSELING, EACH 30 MINUTES: CPT | Mod: ZF | Performed by: GENETIC COUNSELOR, MS

## 2018-04-03 NOTE — MR AVS SNAPSHOT
After Visit Summary   4/3/2018    Halie Rendon    MRN: [id number]           Patient Information     Date Of Birth          1985        Visit Information        Provider Department      4/3/2018 11:30 AM Leoncio Sparrow MD [facility] Maternal Fetal Medicine [facility]        Today's Diagnoses     Family history of birth defects    -  1       Follow-ups after your visit        Your next 10 appointments already scheduled     Apr 10, 2018  4:15 PM CDT   ESTABLISHED PRENATAL with Domenic Timmons MD   [facility] [place] ([facility] [place])    [address] [place] [address]  [address] [place] [id number] [phone]              Who to contact     If you have questions or need follow up information about today's clinic visit or your schedule please contact [facility] directly at [phone].  Normal or non-critical lab and imaging results will be communicated to you by MyChart, letter or phone within 4 business days after the clinic has received the results. If you do not hear from us within 7 days, please contact the clinic through Hangar Sevenhart or phone. If you have a critical or abnormal lab result, we will notify you by phone as soon as possible.  Submit refill requests through Nimaya or call your pharmacy and they will forward the refill request to us. Please allow 3 business days for your refill to be completed.          Additional Information About Your Visit        MyChart Information     Nimaya gives you secure access to your electronic health record. If you see a primary care provider, you can also send messages to your care team and make appointments. If you have questions, please call your primary care clinic.  If you do not have a primary care provider, please call [phone] and they will assist you.        Care EveryWhere ID     This is your Care EveryWhere ID. This could be used by other organizations to access your [facility]  records  [id number]        Your Vitals Were     Last Period                   11/25/2017            Blood Pressure from Last 3 Encounters:   03/14/18 102/56   02/14/18 104/58   01/16/18 (!) 84/46    Weight from Last 3 Encounters:   03/14/18 55.3 kg (122 lb)   02/14/18 54.3 kg (119 lb 11.2 oz)   01/16/18 53.5 kg (118 lb)              Today, you had the following     No orders found for display       Primary Care Provider Office Phone # Fax #    Mary Andrade [phone] [address] [place] [address]        Equal Access to Services     [facility]: Delroy Maria, wadavina horne, mary kaalmasadaf thayer, nany murcia . So [facility] [phone].    ATENCIÓN: Si habla español, tiene a loaiza disposición servicios gratuitos de asistencia lingüística. Llame al [phone].    We comply with applicable federal civil rights laws and [place] laws. We do not discriminate on the basis of race, color, national origin, age, disability, sex, sexual orientation, or gender identity.            Thank you!     Thank you for choosing [facility]  for your care. Our goal is always to provide you with excellent care. Hearing back from our patients is one way we can continue to improve our services. Please take a few minutes to complete the written survey that you may receive in the mail after your visit with us. Thank you!             Your Updated Medication List - Protect others around you: Learn how to safely use, store and throw away your medicines at www.disposemymeds.org.          This list is accurate as of 4/3/18  5:06 PM.  Always use your most recent med list.                   Brand Name Dispense Instructions for use Diagnosis    prenatal multivitamin plus iron 27-0.8 MG Tabs per tablet     100 tablet    Take 1 tablet by mouth daily    High-risk pregnancy, young multigravida in first trimester

## 2018-04-03 NOTE — PROGRESS NOTES
[facility]  Genetic Counseling Consult    Patient:  Halie Rendon YOB: 1985   Date of Service:  18      Halie Rendon was seen at the [facility] for genetic consultation as part of her appointment for comprehensive ultrasound.  The indication for genetic counseling is a prior pregnancy affected with bilateral club feet and a family history of spina bifida.  Halie was accompanied to her appointment today by her  Aden.         Impression/Plan:   1. Halie has not had serum screening in this pregnancy.     2. Halie had a comprehensive (level II) ultrasound today, the results of which were normal.  Please see the ultrasound report for further details.    3. The patient declines genetic amniocentesis and maternal serum screening today.    Pregnancy History:   /Parity:    Age at Delivery: 33 year old  DRAKE: 2018, by Last Menstrual Period  Gestational Age: 18w3d    No significant complications or exposures were reported in the current pregnancy.    Halie vázquez pregnancy history is significant for 1 prior full term pregnancy delivered via  due to fetal intolerance.  This pregnancy was affected with bilateral club feet, see family history for details/discussion.    Medical History:   Halie vázquez reported medical history is not expected to impact pregnancy management or risks to fetal development.  Halie's medical records indicate a personal history of bilateral club feet.  Halie denies this history today and most likely this is referring to her daughter, who was born with bilateral club feet.         Family History:   A three-generation pedigree was obtained, and is scanned under the  Media  tab.   The following significant findings were reported by Halie:    Daughter, 3.5 years old, born with bilateral club feet requiring casting and bracing.  She stopped wearing braces at approximately 2  years of age and has undergone physical therapy since that time.  No surgery required, otherwise healthy and developmentally on track.  We discussed that club feet can occur sporadically or as a presentation of a genetic condition.  We discussed that there may not be genetic testing indicated for isolated club feet, and that most likely their daughter's case is a sporadic incident.  We discussed that Halie's pregnancy may be at increased risk for being similarly affected, and that today's ultrasound is a way of assessing the pregnancy for any signs or indications of club feet as well as other physical changes of development.      Mother, 65, diagnosed with breast cancer in her 50's.  We discussed that there are genetic factors that can predispose an individual to developing certain types of breast cancer, including breast cancer, and that additional/early screening may be recommended for Halie based on her family history.  I encouraged Halie to discuss this topic with her primary care provider at her next routine care appointment.      Paternal first cousin with reported severe autism spectrum disorder.  This individual has 2 unaffected siblings.  We discussed that autism can be inherited in what is called a complex or multifactorial manner.  This means that there can be many things, both genetic and environmental that contribute to an individual developing autism spectrum disorders.  For this reason, multifactorial conditions such as ASD can be seen to run in families, but there may not genetic testing that can be done to tell if someone will be affected or not.       MANJU Aden: 36, healthy, with no children from any other relationships.     Aden: Mother, 75, diagnosed with colon cancer in her 60's, otherwise healthy    Aden's Mother has a maternal first cousin (5th degree relative to the pregnancy) who was born with spina bifida.  We discussed that this distant relative is unlikely to increase risks for  Halie and Aden's children to be similarly affected.      Otherwise, the reported family history is negative for multiple miscarriages, stillbirths, birth defects, cognitive impairment, known genetic conditions, and consanguinity.            Risk Assessment for Chromosome Conditions:   We explained that the risk for fetal chromosome abnormalities increases with maternal age. We discussed specific features of common chromosome abnormalities, including Down syndrome, trisomy 13, trisomy 18, and sex chromosome trisomies.      - At age 33 at midtrimester, the risk to have a baby with Down syndrome is 1 in 508.     - At age 33 at midtrimester, the risk to have a baby with any chromosome abnormality is 1 in 254.     - At age 33 at delivery, the risk to have a baby with Down syndrome is 1 in 769.     - At age 33 at delivery, the risk to have a baby with any chromosome abnormality is 1 in 322.       Halie did not have maternal serum screening earlier in pregnancy.         Testing Options:   We discussed the following options:   Non-invasive Prenatal Testing (NIPT)    Maternal plasma cell-free DNA testing; first trimester ultrasound with nuchal translucency and nasal bone assessment is recommended, when appropriate    Screens for fetal trisomy 21, trisomy 13, trisomy 18, and sex chromosome aneuploidy    Cannot screen for open neural tube defects; maternal serum AFP after 15 weeks is recommended       Quad screen:     Maternal plasma AFP, hCG, estriol, and inhibin measurement between 15-24 weeks gestation    Provides risk assessment for fetal Down syndrome, trisomy 18, and neural tube defects     Genetic Amniocentesis    Invasive procedure typically performed in the second trimester by which amniotic fluid is obtained for the purpose of chromosome analysis and/or other prenatal genetic analysis    Diagnostic results; >99% sensitivity for fetal chromosome abnormalities    AFAFP measurement tests for open neural tube  defects       Comprehensive (Level II) ultrasound:     Detailed ultrasound performed between 18-22 weeks gestation    screen for major birth defects and markers for aneuploidy      We reviewed the benefits and limitations of this testing.  Screening tests provide a risk assessment specific to the pregnancy for certain fetal chromosome abnormalities, but cannot definitively diagnose or exclude a fetal chromosome abnormality.  Follow-up genetic counseling and consideration of diagnostic testing is recommended with any abnormal screening result. Diagnostic tests carry inherent risks- including risk of miscarriage- that require careful consideration.  These tests can detect fetal chromosome abnormalities with greater than 99% certainty.  Results can be compromised by maternal cell contamination or mosaicism, and are limited by the resolution of cytogenetic G-banding technology.  There is no screening nor diagnostic test that can detect all forms of birth defects or mental disability.    It was a pleasure to be involved with Halie s care. Face-to-face time of the meeting was 35 minutes.    Dalton Darden MS, [facility]  Licensed Genetic Counselor  Phone: [phone]  Pager: [phone]

## 2018-04-03 NOTE — MR AVS SNAPSHOT
After Visit Summary   4/3/2018    Halie Rendon    MRN: [id number]           Patient Information     Date Of Birth          1985        Visit Information        Provider Department      4/3/2018 10:15 AM Dalton Darden GC [facility] Maternal Fetal Medicine [facility]        Today's Diagnoses     Family history of birth defects    -  1    Pregnancy related condition, antepartum           Follow-ups after your visit        Your next 10 appointments already scheduled     Apr 10, 2018  4:15 PM CDT   ESTABLISHED PRENATAL with Domenic Timmons MD   [facility] ([facility])    [address] [place] [address]  [address]  [facility] [address] [phone]              Who to contact     If you have questions or need follow up information about today's clinic visit or your schedule please contact [facility] directly at [phone].  Normal or non-critical lab and imaging results will be communicated to you by Torqeedohart, letter or phone within 4 business days after the clinic has received the results. If you do not hear from us within 7 days, please contact the clinic through Torqeedohart or phone. If you have a critical or abnormal lab result, we will notify you by phone as soon as possible.  Submit refill requests through Innovaspire or call your pharmacy and they will forward the refill request to us. Please allow 3 business days for your refill to be completed.          Additional Information About Your Visit        MyChart Information     Innovaspire gives you secure access to your electronic health record. If you see a primary care provider, you can also send messages to your care team and make appointments. If you have questions, please call your primary care clinic.  If you do not have a primary care provider, please call [phone] and they will assist you.        Care EveryWhere ID     This is your Care EveryWhere ID. This could be used by other  organizations to access your [place] medical records  [id number]        Your Vitals Were     Last Period                   11/25/2017            Blood Pressure from Last 3 Encounters:   03/14/18 102/56   02/14/18 104/58   01/16/18 (!) 84/46    Weight from Last 3 Encounters:   03/14/18 55.3 kg (122 lb)   02/14/18 54.3 kg (119 lb 11.2 oz)   01/16/18 53.5 kg (118 lb)              We Performed the Following     [facility] Genetic Counseling        Primary Care Provider Office Phone # Fax #    Mary Andrade [phone] [address] [place] [address]        Equal Access to Services     [facility]: Hadii melania Maria, wadavina horne, qaybta kaalmada jeovany, nany murcia . So [facility] [phone].    ATENCIÓN: Si habla español, tiene a loaiza disposición servicios gratuitos de asistencia lingüística. Llame al [phone].    We comply with applicable federal civil rights laws and [place] laws. We do not discriminate on the basis of race, color, national origin, age, disability, sex, sexual orientation, or gender identity.            Thank you!     Thank you for choosing [facility]  for your care. Our goal is always to provide you with excellent care. Hearing back from our patients is one way we can continue to improve our services. Please take a few minutes to complete the written survey that you may receive in the mail after your visit with us. Thank you!             Your Updated Medication List - Protect others around you: Learn how to safely use, store and throw away your medicines at www.disposemymeds.org.          This list is accurate as of 4/3/18 12:10 PM.  Always use your most recent med list.                   Brand Name Dispense Instructions for use Diagnosis    prenatal multivitamin plus iron 27-0.8 MG Tabs per tablet     100 tablet    Take 1 tablet by mouth daily    High-risk pregnancy, young  multigravida in first trimester

## 2018-04-11 ENCOUNTER — PRENATAL OFFICE VISIT (OUTPATIENT)
Dept: OBGYN | Facility: CLINIC | Age: 33
End: 2018-04-11
Payer: COMMERCIAL

## 2018-04-11 VITALS
WEIGHT: 126.6 LBS | DIASTOLIC BLOOD PRESSURE: 60 MMHG | BODY MASS INDEX: 22.97 KG/M2 | SYSTOLIC BLOOD PRESSURE: 94 MMHG | HEART RATE: 76 BPM

## 2018-04-11 DIAGNOSIS — Z86.32 HISTORY OF GESTATIONAL DIABETES: ICD-10-CM

## 2018-04-11 DIAGNOSIS — O09.622 HIGH-RISK PREGNANCY, YOUNG MULTIGRAVIDA IN SECOND TRIMESTER: Primary | ICD-10-CM

## 2018-04-11 LAB
ERYTHROCYTE [DISTWIDTH] IN BLOOD BY AUTOMATED COUNT: 12.3 % (ref 10–15)
HCT VFR BLD AUTO: 33.6 % (ref 35–47)
HGB BLD-MCNC: 11.6 G/DL (ref 11.7–15.7)
MCH RBC QN AUTO: 33.5 PG (ref 26.5–33)
MCHC RBC AUTO-ENTMCNC: 34.5 G/DL (ref 31.5–36.5)
MCV RBC AUTO: 97 FL (ref 78–100)
PLATELET # BLD AUTO: 245 10E9/L (ref 150–450)
RBC # BLD AUTO: 3.46 10E12/L (ref 3.8–5.2)
WBC # BLD AUTO: 10.3 10E9/L (ref 4–11)

## 2018-04-11 PROCEDURE — 82950 GLUCOSE TEST: CPT | Performed by: OBSTETRICS & GYNECOLOGY

## 2018-04-11 PROCEDURE — 85027 COMPLETE CBC AUTOMATED: CPT | Performed by: OBSTETRICS & GYNECOLOGY

## 2018-04-11 PROCEDURE — 99207 ZZC COMPLICATED OB VISIT: CPT | Performed by: OBSTETRICS & GYNECOLOGY

## 2018-04-11 PROCEDURE — 36415 COLL VENOUS BLD VENIPUNCTURE: CPT | Performed by: OBSTETRICS & GYNECOLOGY

## 2018-04-11 NOTE — MR AVS SNAPSHOT
After Visit Summary   4/11/2018    Halie Rendon    MRN: [id number]           Patient Information     Date Of Birth          1985        Visit Information        Provider Department      4/11/2018 4:00 PM Domenic Timmons MD [facility]        Today's Diagnoses     High-risk pregnancy, young multigravida in second trimester    -  1    History of gestational diabetes           Follow-ups after your visit        Follow-up notes from your care team     Return in about 4 weeks (around 5/9/2018).      Your next 10 appointments already scheduled     May 09, 2018  3:30 PM CDT   ESTABLISHED PRENATAL with Domenic Timmons MD   [facility] ([facility])    [address] [place] [address]  [address]  [place] [address] [phone]            Jun 13, 2018  3:30 PM CDT   ESTABLISHED PRENATAL with Domenic Timmons MD   [facility] ([facility])    [address] [place] [address]  [address]  [place] [address] [phone]            Jun 27, 2018  3:30 PM CDT   ESTABLISHED PRENATAL with Domenic Timmons MD   [facility] ([facility])    [address] [place] [address]  [address]  [place] [address] [phone]            Jul 11, 2018  3:30 PM CDT   ESTABLISHED PRENATAL with Domenic Timmons MD   [facility] ([facility])    [address] [place] [address]  [address]  [place] [address] [phone]              Who to contact     If you have questions or need follow up information about today's clinic visit or your schedule please contact [facility] directly at [phone].  Normal or non-critical lab and imaging results will be communicated to you by MyChart, letter or phone within 4 business days after the clinic has received the results. If you do not hear from us within 7 days, please contact the clinic through MyChart or phone. If you have a critical or abnormal lab  result, we will notify you by phone as soon as possible.  Submit refill requests through [employer] or call your pharmacy and they will forward the refill request to us. Please allow 3 business days for your refill to be completed.          Additional Information About Your Visit        TidbitDotCohart Information     [employer] gives you secure access to your electronic health record. If you see a primary care provider, you can also send messages to your care team and make appointments. If you have questions, please call your primary care clinic.  If you do not have a primary care provider, please call [phone] and they will assist you.        Care EveryWhere ID     This is your Care EveryWhere ID. This could be used by other organizations to access your [place] medical records  [id number]        Your Vitals Were     Pulse Last Period BMI (Body Mass Index)             76 11/25/2017 22.97 kg/m2          Blood Pressure from Last 3 Encounters:   04/11/18 94/60   03/14/18 102/56   02/14/18 104/58    Weight from Last 3 Encounters:   04/11/18 126 lb 9.6 oz (57.4 kg)   03/14/18 122 lb (55.3 kg)   02/14/18 119 lb 11.2 oz (54.3 kg)              We Performed the Following     CBC with platelets     Glucose tolerance gest screen 1 hour        Primary Care Provider Office Phone # Fax #    Mary Andrade [phone] [address] [place] [address]        Equal Access to Services     [facility]: Hadii aad ku hadasho Soomaali, waaxda luqadaha, qaybta kaalmada adeharrisyada, nany murcia . So [facility] [phone].    ATENCIÓN: Si habla español, tiene a loaiza disposición servicios gratuitos de asistencia lingüística. Llame al [phone].    We comply with applicable federal civil rights laws and [place] laws. We do not discriminate on the basis of race, color, national origin, age, disability, sex, sexual orientation, or gender identity.            Thank you!     Thank  you for choosing [facility] [place]  for your care. Our goal is always to provide you with excellent care. Hearing back from our patients is one way we can continue to improve our services. Please take a few minutes to complete the written survey that you may receive in the mail after your visit with us. Thank you!             Your Updated Medication List - Protect others around you: Learn how to safely use, store and throw away your medicines at www.disposemymeds.org.          This list is accurate as of 4/11/18  6:49 PM.  Always use your most recent med list.                   Brand Name Dispense Instructions for use Diagnosis    prenatal multivitamin plus iron 27-0.8 MG Tabs per tablet     100 tablet    Take 1 tablet by mouth daily    High-risk pregnancy, young multigravida in first trimester

## 2018-04-11 NOTE — NURSING NOTE
"Chief Complaint   Patient presents with     Prenatal Care   19w4d  Early GCT today    initial BP 94/60  Pulse 76  Wt 126 lb 9.6 oz (57.4 kg)  LMP 11/25/2017  BMI 22.97 kg/m2 Estimated body mass index is 22.97 kg/(m^2) as calculated from the following:    Height as of 1/16/18: 5' 2.25\" (1.581 m).    Weight as of this encounter: 126 lb 9.6 oz (57.4 kg).  BP completed using cuff size regular.  Mandie Bruner CMA    "

## 2018-04-12 LAB — GLUCOSE 1H P 50 G GLC PO SERPL-MCNC: 190 MG/DL (ref 60–129)

## 2018-04-13 DIAGNOSIS — R73.09 ABNORMAL GLUCOSE TOLERANCE TEST: Primary | ICD-10-CM

## 2018-04-18 ENCOUNTER — ALLIED HEALTH/NURSE VISIT (OUTPATIENT)
Dept: EDUCATION SERVICES | Facility: CLINIC | Age: 33
End: 2018-04-18
Payer: COMMERCIAL

## 2018-04-18 DIAGNOSIS — O24.419 GESTATIONAL DIABETES: Primary | ICD-10-CM

## 2018-04-18 PROCEDURE — G0108 DIAB MANAGE TRN  PER INDIV: HCPCS

## 2018-04-18 RX ORDER — BLOOD-GLUCOSE METER
EACH MISCELLANEOUS
Qty: 1 KIT | Refills: 0 | Status: SHIPPED | OUTPATIENT
Start: 2018-04-18 | End: 2019-08-27

## 2018-04-18 RX ORDER — BLOOD-GLUCOSE METER
1 EACH MISCELLANEOUS 4 TIMES DAILY
Start: 2018-04-18 | End: 2019-08-27

## 2018-04-18 RX ORDER — LANCETS
EACH MISCELLANEOUS
Qty: 150 EACH | Refills: 5 | Status: SHIPPED | OUTPATIENT
Start: 2018-04-18 | End: 2019-08-27

## 2018-04-18 NOTE — MR AVS SNAPSHOT
After Visit Summary   4/18/2018    Halie Rendon    MRN: [id number]           Patient Information     Date Of Birth          1985        Visit Information        Provider Department      4/18/2018 11:00 AM FK GDM [facility] [place]        Today's Diagnoses     Gestational diabetes    -  1      Care Instructions    Kcal- Aim for 2500 kcal per day  Protien 65g per day    Call for refills of testing supplies monthly.  Ask to transfer refill prescriptions to your preferred [facility] pharmacy.      1. Check blood sugar 4 times a day, before breakfast and 1 hour after the start of each meal.     2. Check urine ketones when you wake up every morning for 7 days. If negative everyday, reduce testing to once a week.    3. Follow the recommended meal plan: eat something every 2-3 hours, include protein/fat and carbohydrate at every meal and snack, have 30g carbs at breakfast, 45-60g carbs at lunch, 45-60g carbs at supper, 15-30 carbs at 3 snacks per day.     4. Add activity to every day, try walking or being active after each meal to help control blood sugar levels.    5.Call or e-mail educator if 3 or more blood sugars are above goal in 1 week. Call or e-mail with questions or concerns.      [facility] for the [facility] Area:  For Your Diabetes Education or Nutrition Appointments Call:  [phone]   For Diabetes Education and Nutrition Related Questions:   Phone: [phone]  E-mail: [email or web address]  Fax: [phone]   If you need a medication refill please contact your pharmacy. Please allow 3 business days for your refills to be completed.       Here are some ideas for breakfast or bedtime snack. Pick a carbohydrate from the right and a protein from the left. If you have carbohydrates 30 grams of total carbohydrate and 3-5 grams of fiber that is even better.   Fruits are not listed as they can cause the blood sugar to raise blood  sugar quickly and be used up early in the night. Fruits are better at meals, or morning or afternoon snack.     Protein/Fat list (about 14 grams of protein or 2 fat servings) Carbohydrate list (about 30 grams of carbohydrate)   2 slices of cheese English Muffin   2 TBS Peanut butter/[place] butter/Sun butter 10 crackers     cup Cottage cheese 2% 2 pieces of toast   2 oz any cooked meat - less than deck of cards size 1 hamburger or hot dog bun   1.5 oz of soy nuts 1 whole wheat liat   Avocado or guacamole 6 charles cracker squares     cup tuna - can add mayonnaise 1 cup full fat ice cream - no candy or sauce   2 eggs - boiled, poached, fried, scrambled, omelet 30 chips   1 veggie alexandra (might have carbohydrates also) Greek yogurt - 30 grams of carbohydrate   2 TBS Coconut 2 - 6 inch tortillas corn or flour   12 shrimp - not breaded 2 toaster waffles - no syrup   2-1oz meatballs   bagel   2 Tbs cream cheese - plain, veggie, salmon - no fruit or honey. 6 cups popcorn - unsweetened     cup of nuts Granola bar of 3o grams of carbohydrate   10 olives 1 cup of unsweetened lentils or beans.    Tofu or Temph 4-5oz 1 cup potato salad     You can always add vegetables with dip, salad dressing or salsa also.             Follow-ups after your visit        Your next 10 appointments already scheduled     May 02, 2018 11:30 AM CDT   Diabetic Education with  DIABETIC ED RESOURCE   [facility] [place] ([facility] [place])    [address]  [place] [address]   [phone]            May 09, 2018  3:30 PM CDT   ESTABLISHED PRENATAL with Domenic Timmons MD   [facility] [place] ([facility]    [address]  [address]  [place] [id number] [phone]            Jun 13, 2018  3:30 PM CDT   ESTABLISHED PRENATAL with Domenic Timmons MD   [facility] [place] ([facility] [place])    [address]  [address]  [place] [address]    [phone]            Jun 27, 2018  3:30 PM CDT   ESTABLISHED PRENATAL with Domenic Timmons MD   [facility] [place] ([facility])    [address]  [address]  [place] [address] [phone]            Jul 11, 2018  3:30 PM CDT   ESTABLISHED PRENATAL with Domenic Timmons MD   [facility] [place] [facility]    [address]  [address]  [place] [address] [phone]              Who to contact     If you have questions or need follow up information about today's clinic visit or your schedule please contact [facility] directly at [phone].  Normal or non-critical lab and imaging results will be communicated to you by 28msechart, letter or phone within 4 business days after the clinic has received the results. If you do not hear from us within 7 days, please contact the clinic through 28msechart or phone. If you have a critical or abnormal lab result, we will notify you by phone as soon as possible.  Submit refill requests through [employer] or call your pharmacy and they will forward the refill request to us. Please allow 3 business days for your refill to be completed.          Additional Information About Your Visit        [employer] Information     [employer] gives you secure access to your electronic health record. If you see a primary care provider, you can also send messages to your care team and make appointments. If you have questions, please call your primary care clinic.  If you do not have a primary care provider, please call [phone] and they will assist you.        Care EveryWhere ID     This is your Care EveryWhere ID. This could be used by other organizations to access your [facility] medical records  [id number]        Your Vitals Were     Last Period                   11/25/2017            Blood Pressure from Last 3 Encounters:   04/11/18 94/60   03/14/18 102/56   02/14/18 104/58    Weight from Last 3  Encounters:   04/11/18 57.4 kg (126 lb 9.6 oz)   03/14/18 55.3 kg (122 lb)   02/14/18 54.3 kg (119 lb 11.2 oz)              Today, you had the following     No orders found for display         Today's Medication Changes          These changes are accurate as of 4/18/18 11:59 AM.  If you have any questions, ask your nurse or doctor.               Start taking these medicines.        Dose/Directions    acetone (Urine) test Strp   Used for:  Gestational diabetes        Dose:  1 strip   1 strip as needed (with unexplained blood glucose over 300 mg/dL) Use one strip daily for 1 week, then weekly if negative.   Quantity:  50 each   Refills:  3       blood glucose monitoring lancets   Used for:  Gestational diabetes        Use to test blood sugar 4 times daily.  Ok to substitute alternative if insurance prefers.   Quantity:  150 each   Refills:  5       blood glucose monitoring meter device kit   Used for:  Gestational diabetes        Use to test blood sugar 4 times daily.  Ok to substitute alternative if insurance prefers.   Quantity:  1 kit   Refills:  0       blood glucose monitoring test strip   Commonly known as:  ONETOUCH VERIO IQ   Used for:  Gestational diabetes        Use to test blood sugar 4 times daily.  Ok to substitute alternative if insurance prefers.   Quantity:  150 strip   Refills:  5            Where to get your medicines      These medications were sent to [facility] [place], [place] - [address]  [address], [place] [address]     Phone:  [phone]     acetone (Urine) test Strp    blood glucose monitoring lancets    blood glucose monitoring meter device kit    blood glucose monitoring test strip                Primary Care Provider Office Phone # Fax #    Mary Andrade [phone]       [facility] [place] [address]        Equal Access to Services     LEONARD YBARRA AH: Hadii bere Ruffin,  mary danieljordansadaf leerichy nany stephaniein hayaan adeeg kharash la'aan ah. So [facility] [phone].    ATENCIÓN: Si giselle william, tiene a loaiza disposición servicios gratuitos de asistencia lingüística. Blaze al [phone].    We comply with applicable federal civil rights laws and [place] laws. We do not discriminate on the basis of race, color, national origin, age, disability, sex, sexual orientation, or gender identity.            Thank you!     Thank you for choosing [facility]  for your care. Our goal is always to provide you with excellent care. Hearing back from our patients is one way we can continue to improve our services. Please take a few minutes to complete the written survey that you may receive in the mail after your visit with us. Thank you!             Your Updated Medication List - Protect others around you: Learn how to safely use, store and throw away your medicines at www.disposemymeds.org.          This list is accurate as of 4/18/18 11:59 AM.  Always use your most recent med list.                   Brand Name Dispense Instructions for use Diagnosis    acetone (Urine) test Strp     50 each    1 strip as needed (with unexplained blood glucose over 300 mg/dL) Use one strip daily for 1 week, then weekly if negative.    Gestational diabetes       blood glucose monitoring lancets     150 each    Use to test blood sugar 4 times daily.  Ok to substitute alternative if insurance prefers.    Gestational diabetes       blood glucose monitoring meter device kit     1 kit    Use to test blood sugar 4 times daily.  Ok to substitute alternative if insurance prefers.    Gestational diabetes       blood glucose monitoring test strip    ONETOUCH VERIO IQ    150 strip    Use to test blood sugar 4 times daily.  Ok to substitute alternative if insurance prefers.    Gestational diabetes       prenatal multivitamin plus iron 27-0.8 MG Tabs per tablet     100 tablet    Take 1 tablet by mouth daily     High-risk pregnancy, young multigravida in first trimester

## 2018-04-18 NOTE — PROGRESS NOTES
Diabetes Self-Management Training - Gestational Diabetes    SUBJECTIVE/OBJECTIVE:  Halie Rendon presents today for education related to gestational diabetes.  She is accompanied by self    Patient's gestational diabetes management related comments/concerns: My baby had low blood sugar when she was born does that mean my readings were out of control last time?  Is it possible I have diabetes now?    Patient's emotional response to diabetes: expresses readiness to learn, concern for health and well-being and confidence diabetes can be controlled    LMP 11/25/2017    Pre pregnancy weight: 118#    Weight gain 8 lbs at 20w4d gestation.    Wt Readings from Last 10 Encounters:   04/11/18 57.4 kg (126 lb 9.6 oz)   03/14/18 55.3 kg (122 lb)   02/14/18 54.3 kg (119 lb 11.2 oz)   01/16/18 53.5 kg (118 lb)   11/21/17 53.5 kg (118 lb)   10/13/17 53.8 kg (118 lb 8 oz)   09/28/17 53.4 kg (117 lb 12.8 oz)   12/14/15 53.8 kg (118 lb 8 oz)   12/07/15 55.3 kg (122 lb)   11/02/15 55.3 kg (122 lb)       Estimated Date of Delivery: Sep 1, 2018    Lab Results   Component Value Date     12/14/2015       1 hour OGTT: 190 on 4/11/18- skipped 3 hour due to previous pregnancy with gestational diabetes      History   Smoking Status     Former Smoker   Smokeless Tobacco     Never Used     Comment: rare cigarette use       Lifestyle and Health Behaviors:  Physical Activity: recently no generally planned activity now, plans to buy a tread mill.      Nutrition:  Patient eats 3 meals and 2 snacks per day. Trying to limit to 20g carbohydrates at meals or snacks since learning she has gestational diabetes again.  Patient estimates eating a total of 130g carbohydrates.    Breakfast:  1 pc toast, peanut butter, 1 cup fairlife milk 6g carbohydrate   Snack:  1/2 cup cottage cheese  Lunch:  1/2 cup spaghetti, pesto, cashews  Snack:  1 cup greek yogurt, fruit  Dinner:  Beef stew      Other time(s) food is eaten? no     Beverages: Coffee decaf  1/day, Water throughout/day and fairlife milk 1 cup/day    Cultural/Catholic diet restrictions: No     Biggest challenge to healthy eating is:  Concern she is eating too many carbohydrates with standard meal plan or that is is a lot of food    Pre- Vitamin: Yes    Supplements: No   Experiencing nausea?  No     Socio/Economic considerations:  Support System: family, spouse/significant other and children    Health Beliefs and Attitudes:   Stage of Change: ACTION (Actively working towards change)    ASSESSMENT:  Patients recent food recall contain not enough carbohydrates, and some breakfast do not contain enough protein.  Patient is eating fruits and vegetables.  Patient remembers how to check BG and how to do E-mail follow up.    EER= 1784 kcal/day    2nd trimester= 1784+340 kcal=2124 kcal per day  3rd trimester= 1784+450 kcal= 2234 kcal per day    (1.0 activity factor)    Carbohydrates (35-45%)= 175g- 238grams = 11-16 choices  Protein (15-25%):  grams/day  Fat (20-35%): 424-743 kcal = 47-87 grams    INTERVENTION:  Patient was instructed on One Touch Verio Flex meter and was able to provide an accurate return demonstration. Patient's blood glucose reading today was 108 mg/dL.  Last food PB crackers at 11am.    Educational topics covered today:  GDM diagnosis, pathophysiology, Risks and Complications of GDM, Means of controlling GDM, Using a Blood Glucose Monitor, Blood Glucose Goals, Logging and Interpreting Glucose Results, Ketone Testing, When to Call a Diabetes Educator or OB Provider, Healthy Eating During Pregnancy, Counting Carbohydrates, Meal Planning for GDM, and Physical Activity    Educational materials provided today:   [facility] Understanding Gestational Diabetes  GDM Log Book  Sharps Disposal  Care After Delivery  One Touch Verio Flex meter kit    LOT: [id number]  SN: [id number]    Pt verbalized understanding of concepts discussed and recommendations provided today.     PLAN:  Check glucose 4  times daily, before breakfast and 1 hour after each meal.     Check Ketones daily for one week, if negative, reduce testing to once a week.     Physical activity recommended: as tolerated.    Meal plan: 30g carbs at breakfast, 45-60g carbs at lunch, 45-60g carbs at supper, 15-30g carbs at 3 snacks a day.  Follow consistent CHO meal plan, eat CHO and protein/fat at all meals/snacks.    Call/e-mail/MyChart message diabetes educator if 3 or more blood sugars are above the goal in 1 week or if ketones are positive.     Call/e-mail/MyChart message with questions/concerns.    FOLLOW-UP:  Call or e-mail educator if 3 or more blood sugars are above goal in 1 week.  Appointment scheduled on 5/2/18.   Consent to communicate via E-mail signed: [email or web address]    Jen Pablo MS, RD, LD, CDE    Time Spent: 65 minutes  Encounter type: Individual

## 2018-04-18 NOTE — PATIENT INSTRUCTIONS
Kcal- Aim for 2500 kcal per day  Protien 65g per day    Call for refills of testing supplies monthly.  Ask to transfer refill prescriptions to your preferred [facility] pharmacy.      1. Check blood sugar 4 times a day, before breakfast and 1 hour after the start of each meal.     2. Check urine ketones when you wake up every morning for 7 days. If negative everyday, reduce testing to once a week.    3. Follow the recommended meal plan: eat something every 2-3 hours, include protein/fat and carbohydrate at every meal and snack, have 30g carbs at breakfast, 45-60g carbs at lunch, 45-60g carbs at supper, 15-30 carbs at 3 snacks per day.     4. Add activity to every day, try walking or being active after each meal to help control blood sugar levels.    5.Call or e-mail educator if 3 or more blood sugars are above goal in 1 week. Call or e-mail with questions or concerns.      [facility] for the [facility] Area:  For Your Diabetes Education or Nutrition Appointments Call:  [phone]   For Diabetes Education and Nutrition Related Questions:   Phone: [phone]  E-mail: [email or web address]  Fax: [phone]   If you need a medication refill please contact your pharmacy. Please allow 3 business days for your refills to be completed.       Here are some ideas for breakfast or bedtime snack. Pick a carbohydrate from the right and a protein from the left. If you have carbohydrates 30 grams of total carbohydrate and 3-5 grams of fiber that is even better.   Fruits are not listed as they can cause the blood sugar to raise blood sugar quickly and be used up early in the night. Fruits are better at meals, or morning or afternoon snack.     Protein/Fat list (about 14 grams of protein or 2 fat servings) Carbohydrate list (about 30 grams of carbohydrate)   2 slices of cheese English Muffin   2 TBS Peanut butter/[place] butter/Sun butter 10 crackers     cup Cottage cheese 2% 2 pieces  of toast   2 oz any cooked meat - less than deck of cards size 1 hamburger or hot dog bun   1.5 oz of soy nuts 1 whole wheat liat   Avocado or guacamole 6 charles cracker squares     cup tuna - can add mayonnaise 1 cup full fat ice cream - no candy or sauce   2 eggs - boiled, poached, fried, scrambled, omelet 30 chips   1 veggie alexandra (might have carbohydrates also) Greek yogurt - 30 grams of carbohydrate   2 TBS Coconut 2 - 6 inch tortillas corn or flour   12 shrimp - not breaded 2 toaster waffles - no syrup   2-1oz meatballs   bagel   2 Tbs cream cheese - plain, veggie, salmon - no fruit or honey. 6 cups popcorn - unsweetened     cup of nuts Granola bar of 3o grams of carbohydrate   10 olives 1 cup of unsweetened lentils or beans.    Tofu or Temph 4-5oz 1 cup potato salad     You can always add vegetables with dip, salad dressing or salsa also.

## 2018-04-22 ENCOUNTER — MEDICAL CORRESPONDENCE (OUTPATIENT)
Dept: HEALTH INFORMATION MANAGEMENT | Facility: CLINIC | Age: 33
End: 2018-04-22

## 2018-05-02 ENCOUNTER — ALLIED HEALTH/NURSE VISIT (OUTPATIENT)
Dept: EDUCATION SERVICES | Facility: CLINIC | Age: 33
End: 2018-05-02
Payer: COMMERCIAL

## 2018-05-02 VITALS — BODY MASS INDEX: 23.59 KG/M2 | WEIGHT: 130 LBS

## 2018-05-02 DIAGNOSIS — R73.09 ABNORMAL GLUCOSE TOLERANCE TEST: Primary | ICD-10-CM

## 2018-05-02 PROCEDURE — G0108 DIAB MANAGE TRN  PER INDIV: HCPCS

## 2018-05-02 RX ORDER — LANCETS
1 EACH MISCELLANEOUS DAILY
Qty: 102 EACH | Refills: 3 | Status: SHIPPED | OUTPATIENT
Start: 2018-05-02 | End: 2019-08-27

## 2018-05-02 NOTE — MR AVS SNAPSHOT
After Visit Summary   5/2/2018    Halie Rendon    MRN: [id number]           Patient Information     Date Of Birth          1985        Visit Information        Provider Department      5/2/2018 11:30 AM  DIABETIC ED RESOURCE [facility]        Today's Diagnoses     Abnormal glucose tolerance test    -  1      Care Instructions    1. Check blood sugar 4 times a day, before breakfast and 1 hour after the start of each meal.     2. Check urine ketones when you wake up every morning for 7 days. If negative everyday, reduce testing to once a week.    3. Follow the recommended meal plan: eat something every 2-3 hours, include protein/fat and carbohydrate at every meal and snack, have 2 carbs at breakfast, 3-4 carbs at lunch, 3-4 carbs at supper, 1-2 carbs at 3 snacks per day.     4. Add activity to every day, try walking or being active after each meal to help control blood sugar levels.    5.Call or e-mail educator if 3 or more blood sugars are above goal in 1 week. Call or e-mail with questions or concerns.    Mail in your blood glucose log and 3 days of food record for review in 1 and 2 weeks.      [facility] for the [facility]:  For Your Diabetes Education or Nutrition Appointments Call:  [phone]   For Diabetes Education and Nutrition Related Questions:   Phone: [phone]  E-mail: [email or web address]  Fax: [phone]   If you need a medication refill please contact your pharmacy. Please allow 3 business days for your refills to be completed.             Follow-ups after your visit        Your next 10 appointments already scheduled     May 09, 2018  3:30 PM CDT   ESTABLISHED PRENATAL with Domenic Timmons MD   [facility] [place] ([facility] [place])    [address] [place] [address]  [address]  [place] [address] [phone]            Jun 13, 2018  3:30 PM CDT   ESTABLISHED PRENATAL with Domenic  ECHO Timmons MD   [facility] [place] ([facility])    [address]  [address]  [place] [id number] [phone]            Jun 27, 2018  3:30 PM CDT   ESTABLISHED PRENATAL with Domenic Timmons MD   [facility] [place] ([facility])    [address]  [address]  [place] [address]   [phone]            Jul 11, 2018  3:30 PM CDT   ESTABLISHED PRENATAL with Domenic Timmons MD   [facility] [place] ([facility])    [address]  [address]  [place] [address] [phone]              Who to contact     If you have questions or need follow up information about today's clinic visit or your schedule please contact [facility] directly at [phone].  Normal or non-critical lab and imaging results will be communicated to you by MyChart, letter or phone within 4 business days after the clinic has received the results. If you do not hear from us within 7 days, please contact the clinic through LabMindshart or phone. If you have a critical or abnormal lab result, we will notify you by phone as soon as possible.  Submit refill requests through DivvyCloud or call your pharmacy and they will forward the refill request to us. Please allow 3 business days for your refill to be completed.          Additional Information About Your Visit        MyChart Information     DivvyCloud gives you secure access to your electronic health record. If you see a primary care provider, you can also send messages to your care team and make appointments. If you have questions, please call your primary care clinic.  If you do not have a primary care provider, please call [phone] and they will assist you.        Care EveryWhere ID     This is your Care EveryWhere ID. This could be used by other organizations to access your [place] medical records  [id number]        Your Vitals Were     Last Period BMI (Body Mass  Index)                11/25/2017 23.59 kg/m2           Blood Pressure from Last 3 Encounters:   04/11/18 94/60   03/14/18 102/56   02/14/18 104/58    Weight from Last 3 Encounters:   05/02/18 59 kg (130 lb)   04/11/18 57.4 kg (126 lb 9.6 oz)   03/14/18 55.3 kg (122 lb)              Today, you had the following     No orders found for display         Today's Medication Changes          These changes are accurate as of 5/2/18 12:41 PM.  If you have any questions, ask your nurse or doctor.               These medicines have changed or have updated prescriptions.        Dose/Directions    * blood glucose monitoring lancets   This may have changed:  Another medication with the same name was added. Make sure you understand how and when to take each.   Used for:  Gestational diabetes        Use to test blood sugar 4 times daily.  Ok to substitute alternative if insurance prefers.   Quantity:  150 each   Refills:  5       * blood glucose monitoring lancets   This may have changed:  You were already taking a medication with the same name, and this prescription was added. Make sure you understand how and when to take each.   Used for:  Abnormal glucose tolerance test        Dose:  1 each   1 each daily Use to test blood sugar 4 times daily or as directed.   Quantity:  102 each   Refills:  3       * Notice:  This list has 2 medication(s) that are the same as other medications prescribed for you. Read the directions carefully, and ask your doctor or other care provider to review them with you.         Where to get your medicines      These medications were sent to [address]  [address] [address]     Phone:  [phone]     blood glucose monitoring lancets                Primary Care Provider Office Phone # Fax #    Mary Andrade [phone] [address] [place] [address]        Equal Access to Services     LEONARD YBARRA AH:  Hadii aad ku hadtressao Soserenaali, waaxda luqadaha, qaybta kaalmada jeovany, nany stephaniesherita crane. So [facility] [phone].    ATENCIÓN: Si habla nataliia, tiene a loaiza disposición servicios gratuitos de asistencia lingüística. Llame al [phone].    We comply with applicable federal civil rights laws and [place] laws. We do not discriminate on the basis of race, color, national origin, age, disability, sex, sexual orientation, or gender identity.            Thank you!     Thank you for choosing [facility]  for your care. Our goal is always to provide you with excellent care. Hearing back from our patients is one way we can continue to improve our services. Please take a few minutes to complete the written survey that you may receive in the mail after your visit with us. Thank you!             Your Updated Medication List - Protect others around you: Learn how to safely use, store and throw away your medicines at www.disposemymeds.org.          This list is accurate as of 5/2/18 12:41 PM.  Always use your most recent med list.                   Brand Name Dispense Instructions for use Diagnosis    acetone (Urine) test Strp     50 each    1 strip as needed (with unexplained blood glucose over 300 mg/dL) Use one strip daily for 1 week, then weekly if negative.    Gestational diabetes       * blood glucose monitoring lancets     150 each    Use to test blood sugar 4 times daily.  Ok to substitute alternative if insurance prefers.    Gestational diabetes       * blood glucose monitoring lancets     102 each    1 each daily Use to test blood sugar 4 times daily or as directed.    Abnormal glucose tolerance test       * blood glucose monitoring meter device kit     1 kit    Use to test blood sugar 4 times daily.  Ok to substitute alternative if insurance prefers.    Gestational diabetes       * ONETOUCH VERIO FLEX SYSTEM w/Device Kit      1 kit 4 times daily Sample meter LOT:  [id number] SN: [id number]    Gestational diabetes       blood glucose monitoring test strip    ONETOUCH VERIO IQ    150 strip    Use to test blood sugar 4 times daily.  Ok to substitute alternative if insurance prefers.    Gestational diabetes       prenatal multivitamin plus iron 27-0.8 MG Tabs per tablet     100 tablet    Take 1 tablet by mouth daily    High-risk pregnancy, young multigravida in first trimester       * Notice:  This list has 4 medication(s) that are the same as other medications prescribed for you. Read the directions carefully, and ask your doctor or other care provider to review them with you.

## 2018-05-02 NOTE — PROGRESS NOTES
Gestational Diabetes Follow-up Visit    SUBJECTIVE/OBJECTIVE:  Halie Rendon presents today for education and evaluation of glucose control related to gestational diabetes    She is accompanied by self    Patient's gestational diabetes management related comments/concerns: If I eat more CHO my blood glucose levels go up above goal range.    LMP 11/25/2017    Weight gain 12  lbs at 22  weeks gestation.    Blood Glucose/Ketone Log:    Date Ketones Fasting Post Breakfast Post Lunch Post Supper   4/26  N 82 88 --- 125   4/27 N 77 105 95* 98   4/28 N 80 110 120 115   4/29 N 77 81* 122 90*   4/30 N 79 120 124 98   5/1 N 74 110 91 121   5/2 N 78 102 - -   - - - - - -   - - - - - -     Current gestational diabetes management:    Taking medications for gestational diabetes? No    Physical Activity:   6-7 days per week walks for 20 minutes 2 times per day or 45 minutes one time per day, usually in the evening.    Nutrition:  Patient eats 3 meals and 3 snacks per day, has not started following recommended meal plan and counts carbohydrates in grams.  Patient eating less CHO than recommended.  Eating 1-3 CHO at meals and  0.5-2 CHO for a snack.  Reviewed food record with Alisia Pablo RD who recommended that patient increase her CHO intake at meals up to 45 grams or 3 CHO.    ASSESSMENT:   She is concerned that her blood glucose level was high earlier in this pregnancy than her last one.  She is concerned that with her last pregnancy, her baby had hypoglycemia and is wanting to keep her blood glucose levels low so that does not happen again.  We discussed importance of eating adequate amount of CHO for she and her baby and if her blood glucose levels go above goal it would be recommended to take insulin to control the blood glucose levels.  Patient is concerned that she already has Type 2 diabetes.  Patient prefers to use her Fastclix lancing device from her last glucose meter and requested that an order be entered for  those lancets.  She will talk with the pharmacist about possible coverage and cost for these if they are not covered by her insurance.    Health Beliefs and Attitudes:   Stage of Change: CONTEMPLATION (Considering change and yet undecided)    INTERVENTION:  Educational topics covered today:  What to expect after delivery, Future testing for Type 2 diabetes (2 hour OGTT at 6 week post-partum check-up and annual fasting blood glucose level), Risk of GDM and planning ahead for future pregnancies, Recommended lifestyle interventions for reducing the risk of Type 2 Diabetes, When to Call a Diabetes Educator or OB Provider, insulin resistance    Educational Materials provided today:  [facility] Preventing Diabetes    PLAN:  Check glucose 4 times daily.  Check ketones once a week when readings are consistently negative.  Continue with recommended physical activity.  Continue to follow recommended meal plan: 2 carbs at breakfast, 3-4 carbs at lunch, 3-4 carbs at supper, 1-2 carbs at snacks.  Follow consistent CHO meal plan, eat CHO and protein/fat at all meals/snacks.    Call/e-mail/MyChart message diabetes educator if 3 or more blood sugars are above the goal in 1 week or if ketones are positive.     FOLLOW-UP:  Follow up with diabetes educator in 1 week.    Call/e-mail/MyChart message diabetes educator if 3 or more blood sugars are above the goal in 1 week or if ketones are positive.     Gabi Melo RN  BSN CDE    Time spent was 60 minutes  Encounter type: Individual    Any diabetes medication dose changes were made via the CDE Protocol and Collaborative Practice Agreement with the patient's referring provider.

## 2018-05-02 NOTE — PATIENT INSTRUCTIONS
1. Check blood sugar 4 times a day, before breakfast and 1 hour after the start of each meal.     2. Check urine ketones when you wake up every morning for 7 days. If negative everyday, reduce testing to once a week.    3. Follow the recommended meal plan: eat something every 2-3 hours, include protein/fat and carbohydrate at every meal and snack, have 2 carbs at breakfast, 3-4 carbs at lunch, 3-4 carbs at supper, 1-2 carbs at 3 snacks per day.     4. Add activity to every day, try walking or being active after each meal to help control blood sugar levels.    5.Call or e-mail educator if 3 or more blood sugars are above goal in 1 week. Call or e-mail with questions or concerns.    Mail in your blood glucose log and 3 days of food record for review in 1 and 2 weeks.      [facility] for the [facility]:  For Your Diabetes Education or Nutrition Appointments Call:  [phone]   For Diabetes Education and Nutrition Related Questions:   Phone: [phone]  E-mail: [email or web address]  Fax: [phone]   If you need a medication refill please contact your pharmacy. Please allow 3 business days for your refills to be completed.

## 2018-05-09 ENCOUNTER — PRENATAL OFFICE VISIT (OUTPATIENT)
Dept: OBGYN | Facility: CLINIC | Age: 33
End: 2018-05-09
Payer: COMMERCIAL

## 2018-05-09 ENCOUNTER — TELEPHONE (OUTPATIENT)
Dept: EDUCATION SERVICES | Facility: CLINIC | Age: 33
End: 2018-05-09

## 2018-05-09 VITALS
DIASTOLIC BLOOD PRESSURE: 54 MMHG | HEART RATE: 84 BPM | SYSTOLIC BLOOD PRESSURE: 98 MMHG | WEIGHT: 130 LBS | BODY MASS INDEX: 23.59 KG/M2

## 2018-05-09 DIAGNOSIS — O24.410 DIET CONTROLLED GESTATIONAL DIABETES MELLITUS (GDM) IN SECOND TRIMESTER: ICD-10-CM

## 2018-05-09 DIAGNOSIS — O09.622 HIGH-RISK PREGNANCY, YOUNG MULTIGRAVIDA IN SECOND TRIMESTER: Primary | ICD-10-CM

## 2018-05-09 PROBLEM — Z86.32 HISTORY OF GESTATIONAL DIABETES: Status: RESOLVED | Noted: 2018-03-14 | Resolved: 2018-05-09

## 2018-05-09 PROCEDURE — 99207 ZZC COMPLICATED OB VISIT: CPT | Performed by: OBSTETRICS & GYNECOLOGY

## 2018-05-09 NOTE — MR AVS SNAPSHOT
After Visit Summary   5/9/2018    Halie Rendon    MRN: [id number]           Patient Information     Date Of Birth          1985        Visit Information        Provider Department      5/9/2018 3:30 PM Domenic Timmons MD [facility]        Today's Diagnoses     High-risk pregnancy, young multigravida in second trimester    -  1    Diet controlled gestational diabetes mellitus (GDM) in second trimester           Follow-ups after your visit        Follow-up notes from your care team     Return in about 4 weeks (around 6/6/2018).      Your next 10 appointments already scheduled     Jun 13, 2018  3:30 PM CDT   ESTABLISHED PRENATAL with Domenic Timmons MD   [facility] ([facility])    [address]  [address]  [place] [address] [phone]            Jun 27, 2018  3:30 PM CDT   ESTABLISHED PRENATAL with Domenic Timmons MD   [facility] [place] ([facility])    [address]  [address]  [place] [address] [phone]            Jul 11, 2018  3:30 PM CDT   ESTABLISHED PRENATAL with Domenic Timmons MD   [facility] ([facility])    [address]  [address]  [place] [address] [phone]              Who to contact     If you have questions or need follow up information about today's clinic visit or your schedule please contact [facility] directly at [phone].  Normal or non-critical lab and imaging results will be communicated to you by MyChart, letter or phone within 4 business days after the clinic has received the results. If you do not hear from us within 7 days, please contact the clinic through MyChart or phone. If you have a critical or abnormal lab result, we will notify you by phone as soon as possible.  Submit refill requests through Venmo or call your pharmacy and they will forward the refill request to us. Please allow 3  business days for your refill to be completed.          Additional Information About Your Visit        MyChart Information     mysportgrouphart gives you secure access to your electronic health record. If you see a primary care provider, you can also send messages to your care team and make appointments. If you have questions, please call your primary care clinic.  If you do not have a primary care provider, please call [phone] and they will assist you.        Care EveryWhere ID     This is your Care EveryWhere ID. This could be used by other organizations to access your [place] medical records  [id number]        Your Vitals Were     Pulse Last Period BMI (Body Mass Index)             84 11/25/2017 23.59 kg/m2          Blood Pressure from Last 3 Encounters:   05/09/18 98/54   04/11/18 94/60   03/14/18 102/56    Weight from Last 3 Encounters:   05/09/18 130 lb (59 kg)   05/02/18 130 lb (59 kg)   04/11/18 126 lb 9.6 oz (57.4 kg)              Today, you had the following     No orders found for display       Primary Care Provider Office Phone # Fax #    Mary Andrade [phone] [address] [place] [address]        Equal Access to Services     [facility] Hospital[facility] AH: Hadii melania watermano Solisa, waaxda luqadaha, qaybta kaalmada adeharrisyada, nany crane. So [facility] [phone].    ATENCIÓN: Si habla español, tiene a loaiza disposición servicios gratuitos de asistencia lingüística. Llame al [phone].    We comply with applicable federal civil rights laws and [place] laws. We do not discriminate on the basis of race, color, national origin, age, disability, sex, sexual orientation, or gender identity.            Thank you!     Thank you for choosing [facility]  for your care. Our goal is always to provide you with excellent care. Hearing back from our patients is one way we can continue to improve our services. Please take a few minutes to  complete the written survey that you may receive in the mail after your visit with us. Thank you!             Your Updated Medication List - Protect others around you: Learn how to safely use, store and throw away your medicines at www.disposemymeds.org.          This list is accurate as of 5/9/18  4:18 PM.  Always use your most recent med list.                   Brand Name Dispense Instructions for use Diagnosis    acetone (Urine) test Strp     50 each    1 strip as needed (with unexplained blood glucose over 300 mg/dL) Use one strip daily for 1 week, then weekly if negative.    Gestational diabetes       * blood glucose monitoring lancets     150 each    Use to test blood sugar 4 times daily.  Ok to substitute alternative if insurance prefers.    Gestational diabetes       * blood glucose monitoring lancets     102 each    1 each daily Use to test blood sugar 4 times daily or as directed.    Abnormal glucose tolerance test       * blood glucose monitoring meter device kit     1 kit    Use to test blood sugar 4 times daily.  Ok to substitute alternative if insurance prefers.    Gestational diabetes       * ONETOUCH VERIO FLEX SYSTEM w/Device Kit      1 kit 4 times daily Sample meter LOT: [id number] SN: [id number]    Gestational diabetes       blood glucose monitoring test strip    ONETOUCH VERIO IQ    150 strip    Use to test blood sugar 4 times daily.  Ok to substitute alternative if insurance prefers.    Gestational diabetes       prenatal multivitamin plus iron 27-0.8 MG Tabs per tablet     100 tablet    Take 1 tablet by mouth daily    High-risk pregnancy, young multigravida in first trimester       * Notice:  This list has 4 medication(s) that are the same as other medications prescribed for you. Read the directions carefully, and ask your doctor or other care provider to review them with you.

## 2018-05-09 NOTE — TELEPHONE ENCOUNTER
Gestational Diabetes Follow-up    Subjective/Objective:    Halie Rendon sent in blood glucose log for review. Last date of communication was: 5/2/18.    Gestational diabetes is being managed with diet and activity    Taking diabetes medications: no    Estimated Date of Delivery: Sep 1, 2018    BG/Food Log:         Assessment:    Ketones:neg.   Fasting blood glucoses: 100% in target.  After breakfast: 100% in target.  After lunch: 100% in target.  After dinner: 100% in target.    Plan/Response:  Follow-up in 1 week.    SERGEY Ambrose,    Thank you for sending in your log book! Your blood sugars are overall looking great :) You are doing a good job of increasing your carbohydrate at your meals/snacks as well, although there are a few times I can see that you are a little low still. For example, carrots and hummus is a great healthy snack, but there is very few carbohydrates in these foods. You could have that snack along with a piece of fruit, or a glass of milk, etc. Also, your English muffin pizza lunch was only 2 carbohydrates (2 for the muffin, and the sauce/pepperoni/cheese would not have any carbohydrate). We do want you to get at least the minimum carbs at each meal to allow you and baby to get all of the energy/calories you need for your pregnancy. I would recommend increasing to at least 3 carbs at lunch and dinner, and at least 1 carbohydrate at your snacks (2 carbs and protein for bedtime snack however). Let me know if you have any questions, otherwise let's have you check in next week (or sooner if numbers are becoming elevated) for another review. Feel free to continue the food logs if you feel they are helpful, as they really do help us interpret your blood sugars as well.     Thanks!     Flory Wilkins RD LD CDE    Any diabetes medication dose changes were made via the CDE Protocol and Collaborative Practice Agreement with the patient's OB/GYN provider. A copy of this encounter was shared with the  provider.

## 2018-05-09 NOTE — NURSING NOTE
"Chief Complaint   Patient presents with     Prenatal Care     discuss recommendations from DE     23w4d    Initial BP 98/54 (BP Location: Right arm, Patient Position: Chair, Cuff Size: Adult Regular)  Pulse 84  Wt 130 lb (59 kg)  LMP 11/25/2017  BMI 23.59 kg/m2 Estimated body mass index is 23.59 kg/(m^2) as calculated from the following:    Height as of 1/16/18: 5' 2.25\" (1.581 m).    Weight as of this encounter: 130 lb (59 kg).  Medication Reconciliation: complete     Nurse assisted visit.  Giselle Mckinney MA.        "

## 2018-05-17 ENCOUNTER — TELEPHONE (OUTPATIENT)
Dept: EDUCATION SERVICES | Facility: CLINIC | Age: 33
End: 2018-05-17

## 2018-05-17 NOTE — TELEPHONE ENCOUNTER
Gestational Diabetes Follow-up    Subjective/Objective:    Halie Rendon sent in blood glucose log for review. Last date of communication was: 5/9.    Gestational diabetes is being managed with diet and activity    Taking diabetes medications: no    Estimated Date of Delivery: Sep 1, 2018    BG/Food Log:       Assessment:    Ketones:N.   Fasting blood glucoses: 100% in target.  After breakfast: 100% in target.  After lunch: 100% in target.  After dinner: 100% in target.    Plan/Response:  No changes in the patient's current treatment plan.  Email:    Merritt Ambrose!  Thank you for the update, numbers look so healthy! You are doing great :)    If you want to, you can decrease checking the ketones to just once each week/page to make sure they stay negative.    Since everything is just right, let's have you send in readings again in 2 weeks to get an update. Maybe May 30 or 31?  As always, sooner if you see changes like high numbers or ketone.  Thank you!    Have a nice weekend- Gabby Gomez RD, LD, CDE      Any diabetes medication dose changes were made via the CDE Protocol and Collaborative Practice Agreement with the patient's OB/GYN provider. A copy of this encounter was shared with the provider.

## 2018-06-05 ENCOUNTER — TELEPHONE (OUTPATIENT)
Dept: OBGYN | Facility: CLINIC | Age: 33
End: 2018-06-05

## 2018-06-05 DIAGNOSIS — Z34.80 ENCOUNTER FOR SUPERVISION OF OTHER NORMAL PREGNANCY: ICD-10-CM

## 2018-06-05 DIAGNOSIS — Z34.80 ENCOUNTER FOR SUPERVISION OF OTHER NORMAL PREGNANCY: Primary | ICD-10-CM

## 2018-06-05 PROCEDURE — 36415 COLL VENOUS BLD VENIPUNCTURE: CPT | Performed by: OBSTETRICS & GYNECOLOGY

## 2018-06-05 PROCEDURE — 99000 SPECIMEN HANDLING OFFICE-LAB: CPT | Performed by: OBSTETRICS & GYNECOLOGY

## 2018-06-05 PROCEDURE — 86747 PARVOVIRUS ANTIBODY: CPT | Mod: 91 | Performed by: OBSTETRICS & GYNECOLOGY

## 2018-06-05 PROCEDURE — 86747 PARVOVIRUS ANTIBODY: CPT | Mod: 90 | Performed by: OBSTETRICS & GYNECOLOGY

## 2018-06-05 NOTE — TELEPHONE ENCOUNTER
Pt scheduled for lab this afternoon. She says she may call and cancel as her niece's titer was negative for fifth's.      Maite Greenberg RN

## 2018-06-05 NOTE — TELEPHONE ENCOUNTER
Patient calling saying she was around her niece last Sunday and several times the week before. The doctor said they think her niece might have had fifth's disease, as she developed a rash on Memorial Day. Patient is 27w3d and has been feeling fatigue and some joint pain since Saturday. She does not have a fever or rash. She is concerned since she was around her niece last weekend. Are there any tests the patient should do to rule this out or anything to watch for? Please advise.      Maite Greenberg RN

## 2018-06-07 LAB
B19V IGG SER IA-ACNC: 2.13 IV
B19V IGM SER IA-ACNC: 0.18 IV

## 2018-06-11 ENCOUNTER — TELEPHONE (OUTPATIENT)
Dept: EDUCATION SERVICES | Facility: CLINIC | Age: 33
End: 2018-06-11

## 2018-06-11 NOTE — TELEPHONE ENCOUNTER
Gestational Diabetes Follow-up    Subjective/Objective:    Halie HILTON Gladstone sent in blood glucose log for review. Last date of communication was: 05/17/2018.    Gestational diabetes is being managed with diet and activity    Taking diabetes medications: No     Estimated Date of Delivery: Sep 1, 2018    BG/Food Log:             Assessment:    Ketones:Negative.   Fasting blood glucoses: 100% in target (24/24)  After breakfast: 100% in target.  After lunch: 100% in target.  After dinner: 100% in target.    Plan/Response:  Reduce ketone checks to once a week.  No changes in the patient's current treatment plan.  Follow-up in 2 weeks.    E-mail to patient:     Merritt Ambrose,  Thank you for the update.  Your blood sugars are looking wonderful.  Keep up the great work.       If you can, please try to check ketones once weekly, just to make sure they are staying negative.        Since everything was 100% in target, please update again in about 2 weeks (someitme around 6/25).      Please let us know if you have any questions sooner or if you see 3 numbers that are above goal in one week or postive ketones.     Thank you!     Cat Palomino RD, LD, CDE  Diabetes Education

## 2018-06-13 ENCOUNTER — PRENATAL OFFICE VISIT (OUTPATIENT)
Dept: OBGYN | Facility: CLINIC | Age: 33
End: 2018-06-13
Payer: COMMERCIAL

## 2018-06-13 VITALS
HEART RATE: 84 BPM | WEIGHT: 135 LBS | DIASTOLIC BLOOD PRESSURE: 56 MMHG | BODY MASS INDEX: 24.49 KG/M2 | SYSTOLIC BLOOD PRESSURE: 90 MMHG

## 2018-06-13 DIAGNOSIS — Z67.91 BLOOD TYPE, RH NEGATIVE: ICD-10-CM

## 2018-06-13 DIAGNOSIS — O09.623 HIGH-RISK PREGNANCY, YOUNG MULTIGRAVIDA IN THIRD TRIMESTER: Primary | ICD-10-CM

## 2018-06-13 PROCEDURE — 36415 COLL VENOUS BLD VENIPUNCTURE: CPT | Performed by: OBSTETRICS & GYNECOLOGY

## 2018-06-13 PROCEDURE — 99207 ZZC COMPLICATED OB VISIT: CPT | Performed by: OBSTETRICS & GYNECOLOGY

## 2018-06-13 PROCEDURE — 86850 RBC ANTIBODY SCREEN: CPT | Performed by: OBSTETRICS & GYNECOLOGY

## 2018-06-13 PROCEDURE — 96372 THER/PROPH/DIAG INJ SC/IM: CPT | Performed by: OBSTETRICS & GYNECOLOGY

## 2018-06-13 NOTE — MR AVS SNAPSHOT
After Visit Summary   6/13/2018    Halie Rendon    MRN: [id number]           Patient Information     Date Of Birth          1985        Visit Information        Provider Department      6/13/2018 3:30 PM Domenic Timmons MD [facility]        Today's Diagnoses     High-risk pregnancy, young multigravida in third trimester    -  1    Blood type, Rh negative/O Negative           Follow-ups after your visit        Follow-up notes from your care team     Return in about 2 weeks (around 6/27/2018).      Your next 10 appointments already scheduled     Jun 26, 2018  4:00 PM CDT   ESTABLISHED PRENATAL with Domenic Timmons MD   [facility] ([facility]    [address] [place] [address]  [address]  [facility] [address] [phone]            Jul 11, 2018  3:30 PM CDT   ESTABLISHED PRENATAL with [facility]ic [facility]mons MD   [facility] ([facility]    [address] [place] [address]  [address]  [facility] [address] [phone]              Who to contact     If you have questions or need follow up information about today's clinic visit or your schedule please contact [facility] directly at [phone].  Normal or non-critical lab and imaging results will be communicated to you by Ribbithart, letter or phone within 4 business days after the clinic has received the results. If you do not hear from us within 7 days, please contact the clinic through Ribbithart or phone. If you have a critical or abnormal lab result, we will notify you by phone as soon as possible.  Submit refill requests through Tappr or call your pharmacy and they will forward the refill request to us. Please allow 3 business days for your refill to be completed.          Additional Information About Your Visit        MyChart Information     Tappr gives you secure access to your electronic health record. If you see a primary care provider, you can also send messages  to your care team and make appointments. If you have questions, please call your primary care clinic.  If you do not have a primary care provider, please call [phone] and they will assist you.        Care EveryWhere ID     This is your Care EveryWhere ID. This could be used by other organizations to access your [facility] medical records  [id number]        Your Vitals Were     Pulse Last Period BMI (Body Mass Index)             84 11/25/2017 24.49 kg/m2          Blood Pressure from Last 3 Encounters:   06/13/18 90/56   05/09/18 98/54   04/11/18 94/60    Weight from Last 3 Encounters:   06/13/18 135 lb (61.2 kg)   05/09/18 130 lb (59 kg)   05/02/18 130 lb (59 kg)              We Performed the Following     Antibody screen red cell     RHO D IMMUNE GLOBULIN, FULL DOSE 300 MCG        Primary Care Provider Office Phone # Fax #    Mary Andrade [phone] [address] [place] [address]        Equal Access to Services     [facility]: Hadii aad ku hadasho Soomaali, waaxda luqadaha, qaybta kaalmada adeegyada, waxay noris hayjessica murcia . So [facility] [phone].    ATENCIÓN: Si habla español, tiene a loaiza disposición servicios gratuitos de asistencia lingüística. Llame al [phone].    We comply with applicable federal civil rights laws and [place] laws. We do not discriminate on the basis of race, color, national origin, age, disability, sex, sexual orientation, or gender identity.            Thank you!     Thank you for choosing [facility]  for your care. Our goal is always to provide you with excellent care. Hearing back from our patients is one way we can continue to improve our services. Please take a few minutes to complete the written survey that you may receive in the mail after your visit with us. Thank you!             Your Updated Medication List - Protect others around you: Learn how to safely use, store and throw away your medicines  at www.disposemymeds.org.          This list is accurate as of 6/13/18  5:22 PM.  Always use your most recent med list.                   Brand Name Dispense Instructions for use Diagnosis    acetone (Urine) test Strp     50 each    1 strip as needed (with unexplained blood glucose over 300 mg/dL) Use one strip daily for 1 week, then weekly if negative.    Gestational diabetes       * blood glucose monitoring lancets     150 each    Use to test blood sugar 4 times daily.  Ok to substitute alternative if insurance prefers.    Gestational diabetes       * blood glucose monitoring lancets     102 each    1 each daily Use to test blood sugar 4 times daily or as directed.    Abnormal glucose tolerance test       * blood glucose monitoring meter device kit     1 kit    Use to test blood sugar 4 times daily.  Ok to substitute alternative if insurance prefers.    Gestational diabetes       * ONETOUCH VERIO FLEX SYSTEM w/Device Kit      1 kit 4 times daily Sample meter LOT: [id number] SN: [id number]    Gestational diabetes       blood glucose monitoring test strip    ONETOUCH VERIO IQ    150 strip    Use to test blood sugar 4 times daily.  Ok to substitute alternative if insurance prefers.    Gestational diabetes       prenatal multivitamin plus iron 27-0.8 MG Tabs per tablet     100 tablet    Take 1 tablet by mouth daily    High-risk pregnancy, young multigravida in first trimester       * Notice:  This list has 4 medication(s) that are the same as other medications prescribed for you. Read the directions carefully, and ask your doctor or other care provider to review them with you.

## 2018-06-13 NOTE — NURSING NOTE
"Chief Complaint   Patient presents with     Prenatal Care     glucose done 04/11/18 - needing rhogam today - GDM - levels have been good but seem to creeping up in the morning - travel - thirsty     28w4d    Initial BP 90/56 (BP Location: Right arm, Patient Position: Chair, Cuff Size: Adult Regular)  Pulse 84  Wt 135 lb (61.2 kg)  LMP 11/25/2017  BMI 24.49 kg/m2 Estimated body mass index is 24.49 kg/(m^2) as calculated from the following:    Height as of 1/16/18: 5' 2.25\" (1.581 m).    Weight as of this encounter: 135 lb (61.2 kg).  Medication Reconciliation: complete     Nurse assisted visit.  Giselle Mckinney MA.        "

## 2018-06-13 NOTE — PROGRESS NOTES
IUP at 28 4/7 weeks;     Fasting BS; < 90     PP BS; < 140  Considering scheduling repeat C/S at end of 39 weeks     -if spontaneous labor prior to date; would like TOLAC

## 2018-06-14 ENCOUNTER — NURSE TRIAGE (OUTPATIENT)
Dept: NURSING | Facility: CLINIC | Age: 33
End: 2018-06-14

## 2018-06-14 LAB — BLD GP AB SCN SERPL QL: NORMAL

## 2018-06-15 NOTE — TELEPHONE ENCOUNTER
"Contractions mild and not consistent.  Will count contractions for an hour and call triage back if/when contractions get closer together and more intense.  Deanna Manning RN  [facility]      Additional Information    Negative: Passed out (i.e., lost consciousness, collapsed and was not responding)    Negative: Shock suspected (e.g., cold/pale/clammy skin, too weak to stand, low BP, rapid pulse)    Negative: Difficult to awaken or acting confused  (e.g., disoriented, slurred speech)    Negative: [1] SEVERE abdominal pain (e.g., excruciating) AND [2] constant AND [3] present > 1 hour    Negative: Severe bleeding (e.g., continuous red blood from vagina, or large blood clots)    Negative: Umbilical cord hanging out of the vagina (shiny, white, curled appearance, \"like telephone cord\")    Negative: Uncontrollable urge to push (i.e., feels like baby is coming out now)    Negative: Can see baby    Negative: Sounds like a life-threatening emergency to the triager    Negative: MODERATE-SEVERE abdominal pain    Negative: Contractions < 10 minutes apart for 1 hour (i.e., 6 or more contractions an hour)    Negative: [1] Contractions > 10 minutes apart AND [2] persist > 24 hours AND [3] no improvement using Care Advice    Negative: [1] Contractions AND [2] any vaginal bleeding (including: red blood, clots, spotting, or pink/brown mucous)    Negative: Vaginal bleeding    (Exception: spotting after intercourse or pelvic exam, or slight pinkish or brownish mucous discharge)    Negative: Leakage of fluid from vagina    Negative: [1] Baby moving less today (e.g., kick count < 5 in 1 hour or < 10 in 2 hours) AND [2] pregnant 23 or more weeks    Negative: No movement of baby for 8 hours    Negative: New hand or face swelling    Negative: Fever > 100.4 F (38.0 C)    Negative: Increased pressure in pelvic area    Negative: [1] Lower back discomfort AND [2] not relieved by rest    Negative: Has a cerclage (i.e., a procedure " where the obstetrician stitches shut the cervix)    Negative: Pinkish or brownish mucous discharge  (Exception: spotting after intercourse or pelvic exam)    Negative: Patient sounds very sick or weak to the triager    Negative: Baby has dropped    Negative: Increase in vaginal discharge    Negative: Diarrhea    Negative: Prior history of  labor    [1] Mild contractions AND [2] > 10 minutes apart  (all triage questions negative)    Protocols used: PREGNANCY - LABOR - -ADULT-

## 2018-06-26 ENCOUNTER — PRENATAL OFFICE VISIT (OUTPATIENT)
Dept: OBGYN | Facility: CLINIC | Age: 33
End: 2018-06-26
Payer: COMMERCIAL

## 2018-06-26 VITALS
SYSTOLIC BLOOD PRESSURE: 92 MMHG | WEIGHT: 137 LBS | BODY MASS INDEX: 24.86 KG/M2 | DIASTOLIC BLOOD PRESSURE: 60 MMHG | HEART RATE: 80 BPM

## 2018-06-26 DIAGNOSIS — O09.623 HIGH-RISK PREGNANCY, YOUNG MULTIGRAVIDA IN THIRD TRIMESTER: Primary | ICD-10-CM

## 2018-06-26 PROCEDURE — 90471 IMMUNIZATION ADMIN: CPT | Performed by: OBSTETRICS & GYNECOLOGY

## 2018-06-26 PROCEDURE — 99207 ZZC COMPLICATED OB VISIT: CPT | Performed by: OBSTETRICS & GYNECOLOGY

## 2018-06-26 PROCEDURE — 90715 TDAP VACCINE 7 YRS/> IM: CPT | Performed by: OBSTETRICS & GYNECOLOGY

## 2018-06-26 NOTE — NURSING NOTE
"Chief Complaint   Patient presents with     Prenatal Care      date? - baby active and moving     30w3d    Initial BP 92/60 (BP Location: Right arm, Patient Position: Chair, Cuff Size: Adult Regular)  Pulse 80  Wt 137 lb (62.1 kg)  LMP 2017  BMI 24.86 kg/m2 Estimated body mass index is 24.86 kg/(m^2) as calculated from the following:    Height as of 18: 5' 2.25\" (1.581 m).    Weight as of this encounter: 137 lb (62.1 kg).  Medication Reconciliation: complete     Nurse assisted visit.  Giselle Mckinney MA.        "

## 2018-06-26 NOTE — MR AVS SNAPSHOT
After Visit Summary   6/26/2018    Halie Rendon    MRN: [id number]           Patient Information     Date Of Birth          1985        Visit Information        Provider Department      6/26/2018 4:00 PM Domenic Timmons MD [facility]        Today's Diagnoses     High-risk pregnancy, young multigravida in third trimester    -  1       Follow-ups after your visit        Follow-up notes from your care team     Return in about 2 weeks (around 7/10/2018).      Your next 10 appointments already scheduled     Jul 11, 2018  3:30 PM CDT   ESTABLISHED PRENATAL with [facility] MD   [facility]    [address] [place] [address]  [address]  [facility] [address] [phone]            Jul 25, 2018  3:15 PM CDT   ESTABLISHED PRENATAL with Domenic Timmons MD   [facility])    [address] [place] [address]  [address]  [facility] [address] [phone]              Who to contact     If you have questions or need follow up information about today's clinic visit or your schedule please contact [facility] directly at [phone].  Normal or non-critical lab and imaging results will be communicated to you by MyChart, letter or phone within 4 business days after the clinic has received the results. If you do not hear from us within 7 days, please contact the clinic through Helical IT Solutionshart or phone. If you have a critical or abnormal lab result, we will notify you by phone as soon as possible.  Submit refill requests through MMIC Solutions or call your pharmacy and they will forward the refill request to us. Please allow 3 business days for your refill to be completed.          Additional Information About Your Visit        MyChart Information     MMIC Solutions gives you secure access to your electronic health record. If you see a primary care provider, you can also send messages to your care team and make appointments.  If you have questions, please call your primary care clinic.  If you do not have a primary care provider, please call [phone] and they will assist you.        Care EveryWhere ID     This is your Care EveryWhere ID. This could be used by other organizations to access your [facility] [facility] medical records  [id number]        Your Vitals Were     Pulse Last Period BMI (Body Mass Index)             80 11/25/2017 24.86 kg/m2          Blood Pressure from Last 3 Encounters:   06/26/18 92/60   06/13/18 90/56   05/09/18 98/54    Weight from Last 3 Encounters:   06/26/18 137 lb (62.1 kg)   06/13/18 135 lb (61.2 kg)   05/09/18 130 lb (59 kg)              We Performed the Following     TDAP, IM (10 - 64 YRS) - Adacel        Primary Care Provider Office Phone # Fax #    Mary Andrade [phone] [address] [place] [address]        Equal Access to Services     [facility]: Hadii aad ku hadasho Soomaali, waaxda luqadaha, qaybta kaalmada adeegyada, waxay idiin hayaan jesus murcia . So [facility] [phone].    ATENCIÓN: Si habla español, tiene a loaiza disposición servicios gratuitos de asistencia lingüística. [facility] [phone].    We comply with applicable federal civil rights laws and [place] laws. We do not discriminate on the basis of race, color, national origin, age, disability, sex, sexual orientation, or gender identity.            Thank you!     Thank you for choosing [facility]  for your care. Our goal is always to provide you with excellent care. Hearing back from our patients is one way we can continue to improve our services. Please take a few minutes to complete the written survey that you may receive in the mail after your visit with us. Thank you!             Your Updated Medication List - Protect others around you: Learn how to safely use, store and throw away your medicines at www.disposemymeds.org.          This list is accurate as of 6/26/18  5:00  PM.  Always use your most recent med list.                   Brand Name Dispense Instructions for use Diagnosis    acetone (Urine) test Strp     50 each    1 strip as needed (with unexplained blood glucose over 300 mg/dL) Use one strip daily for 1 week, then weekly if negative.    Gestational diabetes       * blood glucose monitoring lancets     150 each    Use to test blood sugar 4 times daily.  Ok to substitute alternative if insurance prefers.    Gestational diabetes       * blood glucose monitoring lancets     102 each    1 each daily Use to test blood sugar 4 times daily or as directed.    Abnormal glucose tolerance test       * blood glucose monitoring meter device kit     1 kit    Use to test blood sugar 4 times daily.  Ok to substitute alternative if insurance prefers.    Gestational diabetes       * ONETOUCH VERIO FLEX SYSTEM w/Device Kit      1 kit 4 times daily Sample meter LOT: [id number] SN: [id number]    Gestational diabetes       blood glucose monitoring test strip    ONETOUCH VERIO IQ    150 strip    Use to test blood sugar 4 times daily.  Ok to substitute alternative if insurance prefers.    Gestational diabetes       prenatal multivitamin plus iron 27-0.8 MG Tabs per tablet     100 tablet    Take 1 tablet by mouth daily    High-risk pregnancy, young multigravida in first trimester       * Notice:  This list has 4 medication(s) that are the same as other medications prescribed for you. Read the directions carefully, and ask your doctor or other care provider to review them with you.

## 2018-07-11 ENCOUNTER — TELEPHONE (OUTPATIENT)
Dept: OBGYN | Facility: CLINIC | Age: 33
End: 2018-07-11

## 2018-07-11 ENCOUNTER — PRENATAL OFFICE VISIT (OUTPATIENT)
Dept: OBGYN | Facility: CLINIC | Age: 33
End: 2018-07-11
Payer: COMMERCIAL

## 2018-07-11 VITALS — WEIGHT: 139 LBS | SYSTOLIC BLOOD PRESSURE: 108 MMHG | BODY MASS INDEX: 25.22 KG/M2 | DIASTOLIC BLOOD PRESSURE: 70 MMHG

## 2018-07-11 DIAGNOSIS — O09.623 HIGH-RISK PREGNANCY, YOUNG MULTIGRAVIDA IN THIRD TRIMESTER: Primary | ICD-10-CM

## 2018-07-11 PROCEDURE — 99207 ZZC COMPLICATED OB VISIT: CPT | Performed by: OBSTETRICS & GYNECOLOGY

## 2018-07-11 NOTE — MR AVS SNAPSHOT
After Visit Summary   7/11/2018    Halie Rendon    MRN: [id number]           Patient Information     Date Of Birth          1985        Visit Information        Provider Department      7/11/2018 3:30 PM Domenic Timmons MD [facility]        Today's Diagnoses     High-risk pregnancy, young multigravida in third trimester    -  1       Follow-ups after your visit        Follow-up notes from your care team     Return in about 2 weeks (around 7/25/2018).      Your next 10 appointments already scheduled     Jul 25, 2018  3:15 PM CDT   ESTABLISHED PRENATAL with Domenic Timmons MD   [facility] ([facility])    [address]  [address]  [facility] [address] [phone]              Who to contact     If you have questions or need follow up information about today's clinic visit or your schedule please contact [facility] directly at [phone].  Normal or non-critical lab and imaging results will be communicated to you by Sweet P'shart, letter or phone within 4 business days after the clinic has received the results. If you do not hear from us within 7 days, please contact the clinic through Orchid [employer] or phone. If you have a critical or abnormal lab result, we will notify you by phone as soon as possible.  Submit refill requests through Bookioo or call your pharmacy and they will forward the refill request to us. Please allow 3 business days for your refill to be completed.          Additional Information About Your Visit        MyChart Information     Bookioo gives you secure access to your electronic health record. If you see a primary care provider, you can also send messages to your care team and make appointments. If you have questions, please call your primary care clinic.  If you do not have a primary care provider, please call [phone] and they will assist you.        Care EveryWhere ID     This is your Care EveryWhere ID.  This could be used by other organizations to access your [place] medical records  [id number]        Your Vitals Were     Last Period BMI (Body Mass Index)                11/25/2017 25.22 kg/m2           Blood Pressure from Last 3 Encounters:   07/11/18 108/70   06/26/18 92/60   06/13/18 90/56    Weight from Last 3 Encounters:   07/11/18 139 lb (63 kg)   06/26/18 137 lb (62.1 kg)   06/13/18 135 lb (61.2 kg)              Today, you had the following     No orders found for display       Primary Care Provider Office Phone # Fax #    Mary Andrade [phone] [address] [place] [address]        Equal Access to Services     [facility] : Delroy watermano Solisa, waaxda luqadaha, qaybta kaalmada adeegyasadaf, nany murcia . So [facility] [phone].    ATENCIÓN: Si habla español, tiene a loaiza disposición servicios gratuitos de asistencia lingüística. Ll[facility] [phone].    We comply with applicable federal civil rights laws and [place] laws. We do not discriminate on the basis of race, color, national origin, age, disability, sex, sexual orientation, or gender identity.            Thank you!     Thank you for choosing [facility]  for your care. Our goal is always to provide you with excellent care. Hearing back from our patients is one way we can continue to improve our services. Please take a few minutes to complete the written survey that you may receive in the mail after your visit with us. Thank you!             Your Updated Medication List - Protect others around you: Learn how to safely use, store and throw away your medicines at www.disposemymeds.org.          This list is accurate as of 7/11/18  7:03 PM.  Always use your most recent med list.                   Brand Name Dispense Instructions for use Diagnosis    acetone (Urine) test Strp     50 each    1 strip as needed (with unexplained blood glucose over 300 mg/dL) Use  one strip daily for 1 week, then weekly if negative.    Gestational diabetes       * blood glucose monitoring lancets     150 each    Use to test blood sugar 4 times daily.  Ok to substitute alternative if insurance prefers.    Gestational diabetes       * blood glucose monitoring lancets     102 each    1 each daily Use to test blood sugar 4 times daily or as directed.    Abnormal glucose tolerance test       * blood glucose monitoring meter device kit     1 kit    Use to test blood sugar 4 times daily.  Ok to substitute alternative if insurance prefers.    Gestational diabetes       * ONETOUCH VERIO FLEX SYSTEM w/Device Kit      1 kit 4 times daily Sample meter LOT: [id number] SN: [id number]    Gestational diabetes       blood glucose monitoring test strip    ONETOUCH VERIO IQ    150 strip    Use to test blood sugar 4 times daily.  Ok to substitute alternative if insurance prefers.    Gestational diabetes       prenatal multivitamin plus iron 27-0.8 MG Tabs per tablet     100 tablet    Take 1 tablet by mouth daily    High-risk pregnancy, young multigravida in first trimester       * Notice:  This list has 4 medication(s) that are the same as other medications prescribed for you. Read the directions carefully, and ask your doctor or other care provider to review them with you.

## 2018-07-11 NOTE — PROGRESS NOTES
IUP at 32 4/7 weeks    -glucose; PP and fasting within parameters    -desires scheduling repeat C/S 8/31/18       -considering TOLAC if goes into labor before

## 2018-07-11 NOTE — NURSING NOTE
"Chief Complaint   Patient presents with     Prenatal Care       Initial /70  Wt 139 lb (63 kg)  LMP 2017  BMI 25.22 kg/m2 Estimated body mass index is 25.22 kg/(m^2) as calculated from the following:    Height as of 18: 5' 2.25\" (1.581 m).    Weight as of this encounter: 139 lb (63 kg).  BP completed using cuff size: regular        The following HM Due: NONE    +fetal movement  +lavern wong  +slight swelling  +headaches    Kayla Hinkle, CMA         "

## 2018-07-12 NOTE — TELEPHONE ENCOUNTER
Type of surgery: repeat  section  Location of surgery: [facility] OR  Date and time of surgery: 18 @ 7:00 am  Surgeon: Dr. Timmons  Pre-Op Appt Date: @ prenatal appointment  Post-Op Appt Date: 18   Packet sent out: Yes  Pre-cert/Authorization completed:  No  Date: 18

## 2018-07-12 NOTE — TELEPHONE ENCOUNTER
Please advise and assist     -schedule; repeat  section 18                       FR                       Pre op; me                       Assist; Edwina, if available    Please enquire if Edwina can assist this Friday for repeat C/S    Thank-you

## 2018-07-25 ENCOUNTER — PRENATAL OFFICE VISIT (OUTPATIENT)
Dept: OBGYN | Facility: CLINIC | Age: 33
End: 2018-07-25
Payer: COMMERCIAL

## 2018-07-25 VITALS
DIASTOLIC BLOOD PRESSURE: 60 MMHG | SYSTOLIC BLOOD PRESSURE: 102 MMHG | BODY MASS INDEX: 25.67 KG/M2 | WEIGHT: 141.5 LBS | HEART RATE: 80 BPM

## 2018-07-25 DIAGNOSIS — O24.410 DIET CONTROLLED GESTATIONAL DIABETES MELLITUS (GDM) IN THIRD TRIMESTER: ICD-10-CM

## 2018-07-25 DIAGNOSIS — O09.623 HIGH-RISK PREGNANCY, YOUNG MULTIGRAVIDA IN THIRD TRIMESTER: Primary | ICD-10-CM

## 2018-07-25 LAB
ERYTHROCYTE [DISTWIDTH] IN BLOOD BY AUTOMATED COUNT: 12.3 % (ref 10–15)
HCT VFR BLD AUTO: 36.9 % (ref 35–47)
HGB BLD-MCNC: 12.8 G/DL (ref 11.7–15.7)
MCH RBC QN AUTO: 35 PG (ref 26.5–33)
MCHC RBC AUTO-ENTMCNC: 34.7 G/DL (ref 31.5–36.5)
MCV RBC AUTO: 101 FL (ref 78–100)
PLATELET # BLD AUTO: 199 10E9/L (ref 150–450)
RBC # BLD AUTO: 3.66 10E12/L (ref 3.8–5.2)
WBC # BLD AUTO: 9.7 10E9/L (ref 4–11)

## 2018-07-25 PROCEDURE — 84439 ASSAY OF FREE THYROXINE: CPT | Performed by: OBSTETRICS & GYNECOLOGY

## 2018-07-25 PROCEDURE — 36415 COLL VENOUS BLD VENIPUNCTURE: CPT | Performed by: OBSTETRICS & GYNECOLOGY

## 2018-07-25 PROCEDURE — 84443 ASSAY THYROID STIM HORMONE: CPT | Performed by: OBSTETRICS & GYNECOLOGY

## 2018-07-25 PROCEDURE — 99207 ZZC COMPLICATED OB VISIT: CPT | Performed by: OBSTETRICS & GYNECOLOGY

## 2018-07-25 PROCEDURE — 85027 COMPLETE CBC AUTOMATED: CPT | Performed by: OBSTETRICS & GYNECOLOGY

## 2018-07-25 NOTE — MR AVS SNAPSHOT
After Visit Summary   7/25/2018    Halie Rendon    MRN: [id number]           Patient Information     Date Of Birth          1985        Visit Information        Provider Department      7/25/2018 3:15 PM Domenic Timmons MD [facility]        Today's Diagnoses     High-risk pregnancy, young multigravida in third trimester    -  1    Diet controlled gestational diabetes mellitus (GDM) in third trimester           Follow-ups after your visit        Additional Services     MAT FETAL MED CTR REFERRAL-PREGNANCY       Body mass index is 25.67 kg/(m^2).    >> Patient may proceed with recommendations for further testing as directed by the Maternal Fetal Medicine Specialist >>    >> If requesting Fetal Echo: MFM will determine appropriate location for exam due to indication.    >> If requesting Lung Maturity Amnio:  If results indicate fetal lung maturity, induction or C/S is recommended within 36 hours.  Please schedule accordingly.     Dear Patient:   Please be aware that coverage of these services is subject to the terms and limitations of your health insurance plan.  Call member services at your health plan with any benefit or coverage questions.      Please bring the following to your appointment:    >>  Any x-rays, CTs or MRIs which have been performed.  Contact the facility where they were done to arrange for  prior to your scheduled appointment.  Any new CT, MRI or other procedures ordered by your specialist must be performed at a [facility] facility or coordinated by your clinic's referral office.  >>  List of current medications   >>  This referral request   >>  Any documents/labs given to you for this referral                  Follow-up notes from your care team     Return in about 2 weeks (around 8/8/2018).      Your next 10 appointments already scheduled     Aug 06, 2018 11:30 AM CDT   ESTABLISHED PRENATAL with Domenic Timmons MD   [facility]  ([facility])    [address]  [facility] [address]   [phone]            Aug 13, 2018  3:30 PM CDT   ESTABLISHED PRENATAL with Domenic Timmons MD   [facility] ([facility])    [address]  [facility] [address]   [phone]            Aug 21, 2018  3:45 PM CDT   ESTABLISHED PRENATAL with Domenic Timmons MD   [facility] ([facility]    [address] [place] [address]  [address]  [place] [address] [phone]            Aug 29, 2018  3:30 PM CDT   ESTABLISHED PRENATAL with Domenic Timmons MD   [facility] ([facility])    [address] [place] [address]  [address]  [place] [address]   [phone]            Aug 31, 2018   Procedure with Domenic Timmons MD   [facility] Labor and Delivery (--)    [address]  [facility] [address]   [phone]            Sep 12, 2018  2:00 PM CDT   SHORT with Domenic Timmons MD   [facility] ([facility])    [address] [place] [address]  [address]  [place] [address] [phone]              Who to contact     If you have questions or need follow up information about today's clinic visit or your schedule please contact [facility] directly at [phone].  Normal or non-critical lab and imaging results will be communicated to you by Mobile Theoryhart, letter or phone within 4 business days after the clinic has received the results. If you do not hear from us within 7 days, please contact the clinic through Britelyt or phone. If you have a critical or abnormal lab result, we will notify you by phone as soon as possible.  Submit refill requests through Teevox or call your pharmacy and they will forward the refill request to us. Please allow 3 business days for your refill to be completed.          Additional Information About Your Visit        Mobile [email or web address] Information     Teevox gives you  secure access to your electronic health record. If you see a primary care provider, you can also send messages to your care team and make appointments. If you have questions, please call your primary care clinic.  If you do not have a primary care provider, please call [phone] and they will assist you.        Care EveryWhere ID     This is your Care EveryWhere ID. This could be used by other organizations to access your [facility] medical records  [id number]        Your Vitals Were     Pulse Last Period BMI (Body Mass Index)             80 11/25/2017 25.67 kg/m2          Blood Pressure from Last 3 Encounters:   07/25/18 102/60   07/11/18 108/70   06/26/18 92/60    Weight from Last 3 Encounters:   07/25/18 141 lb 8 oz (64.2 kg)   07/11/18 139 lb (63 kg)   06/26/18 137 lb (62.1 kg)              We Performed the Following     CBC with platelets     MAT FETAL MED CTR REFERRAL-PREGNANCY     T4 FREE     TSH        Primary Care Provider Office Phone # Fax #    Mary Andrade [phone] [address] [place] [address]        Equal Access to Services     [facility]: Hadii aad ku hadasho Solisa, waaxda luqadaha, qaybta kaalmada jeovany, nany murcia . So [facility] [phone].    ATENCIÓN: Si habla español, tiene a loaiza disposición servicios gratuitos de asistencia lingüística. Llame al [phone].    We comply with applicable federal civil rights laws and [place] laws. We do not discriminate on the basis of race, color, national origin, age, disability, sex, sexual orientation, or gender identity.            Thank you!     Thank you for choosing [facility]  for your care. Our goal is always to provide you with excellent care. Hearing back from our patients is one way we can continue to improve our services. Please take a few minutes to complete the written survey that you may receive in the mail after your visit with us. Thank  you!             Your Updated Medication List - Protect others around you: Learn how to safely use, store and throw away your medicines at www.disposemymeds.org.          This list is accurate as of 7/25/18 11:59 PM.  Always use your most recent med list.                   Brand Name Dispense Instructions for use Diagnosis    acetone (Urine) test Strp     50 each    1 strip as needed (with unexplained blood glucose over 300 mg/dL) Use one strip daily for 1 week, then weekly if negative.    Gestational diabetes       * blood glucose monitoring lancets     150 each    Use to test blood sugar 4 times daily.  Ok to substitute alternative if insurance prefers.    Gestational diabetes       * blood glucose monitoring lancets     102 each    1 each daily Use to test blood sugar 4 times daily or as directed.    Abnormal glucose tolerance test       * blood glucose monitoring meter device kit     1 kit    Use to test blood sugar 4 times daily.  Ok to substitute alternative if insurance prefers.    Gestational diabetes       * ONETOUCH VERIO FLEX SYSTEM w/Device Kit      1 kit 4 times daily Sample meter LOT: [id number] SN: [id number]    Gestational diabetes       blood glucose monitoring test strip    ONETOUCH VERIO IQ    150 strip    Use to test blood sugar 4 times daily.  Ok to substitute alternative if insurance prefers.    Gestational diabetes       prenatal multivitamin plus iron 27-0.8 MG Tabs per tablet     100 tablet    Take 1 tablet by mouth daily    High-risk pregnancy, young multigravida in first trimester       * Notice:  This list has 4 medication(s) that are the same as other medications prescribed for you. Read the directions carefully, and ask your doctor or other care provider to review them with you.

## 2018-07-25 NOTE — PROGRESS NOTES
IUP at 34 4/7 weeks     Glucose;        Fasting and PP within parameters     BPP starting 36 weeks

## 2018-07-25 NOTE — NURSING NOTE
"Chief Complaint   Patient presents with     Prenatal Care   34w4d  C/o increase in fatigue x 2 weeks    initial /60  Pulse 80  Wt 141 lb 8 oz (64.2 kg)  LMP 11/25/2017  BMI 25.67 kg/m2 Estimated body mass index is 25.67 kg/(m^2) as calculated from the following:    Height as of 1/16/18: 5' 2.25\" (1.581 m).    Weight as of this encounter: 141 lb 8 oz (64.2 kg).  BP completed using cuff size regular.  Mandie Bruner CMA    "

## 2018-07-26 LAB
T4 FREE SERPL-MCNC: 0.81 NG/DL (ref 0.76–1.46)
TSH SERPL DL<=0.005 MIU/L-ACNC: 0.56 MU/L (ref 0.4–4)

## 2018-07-29 ENCOUNTER — NURSE TRIAGE (OUTPATIENT)
Dept: NURSING | Facility: CLINIC | Age: 33
End: 2018-07-29

## 2018-07-29 NOTE — TELEPHONE ENCOUNTER
"    Additional Information    Negative: Passed out (i.e., lost consciousness, collapsed and was not responding)    Negative: Shock suspected (e.g., cold/pale/clammy skin, too weak to stand, low BP, rapid pulse)    Negative: Difficult to awaken or acting confused  (e.g., disoriented, slurred speech)    Negative: [1] SEVERE abdominal pain (e.g., excruciating) AND [2] constant AND [3] present > 1 hour    Negative: Severe bleeding (e.g., continuous red blood from vagina, or large blood clots)    Negative: Umbilical cord hanging out of the vagina (shiny, white, curled appearance, \"like telephone cord\")    Negative: Uncontrollable urge to push (i.e., feels like baby is coming out now)    Negative: Can see baby    Negative: Sounds like a life-threatening emergency to the triager    Negative: MODERATE-SEVERE abdominal pain    Negative: Contractions < 10 minutes apart for 1 hour (i.e., 6 or more contractions an hour)    Negative: [1] Contractions > 10 minutes apart AND [2] persist > 24 hours AND [3] no improvement using Care Advice    Negative: [1] Contractions AND [2] any vaginal bleeding (including: red blood, clots, spotting, or pink/brown mucous)    Negative: Vaginal bleeding    (Exception: spotting after intercourse or pelvic exam, or slight pinkish or brownish mucous discharge)    Negative: Leakage of fluid from vagina    Negative: [1] Baby moving less today (e.g., kick count < 5 in 1 hour or < 10 in 2 hours) AND [2] pregnant 23 or more weeks    Negative: No movement of baby for 8 hours    Negative: New hand or face swelling    Negative: Fever > 100.4 F (38.0 C)    Negative: Increased pressure in pelvic area    Negative: [1] Lower back discomfort AND [2] not relieved by rest    Negative: Has a cerclage (i.e., a procedure where the obstetrician stitches shut the cervix)    Negative: Pinkish or brownish mucous discharge  (Exception: spotting after intercourse or pelvic exam)    Negative: Patient sounds very sick or weak " "to the triager    Negative: Baby has dropped    Slight spotting (single episode) after sexual intercourse or pelvic exam     Lost mucous plug only, no spotting    Negative: Increase in vaginal discharge    Negative: Diarrhea    Negative: Prior history of  labor    Negative: [1] Mild contractions AND [2] > 10 minutes apart  (all triage questions negative)    Answer Assessment - Initial Assessment Questions  1. ONSET: \"When did the symptoms begin?\"         Lost mucous plug after intercourse today, is 35 weeks  2. CONTRACTIONS: \"Describe the contractions that you are having.\" (e.g., duration, frequency, regularity, severity)      None, only lavern-wong  3. DRAKE: \"What date are you expecting to deliver?\"      35 weeks  4. PARITY: \"Have you had a baby before?\" If yes, \"How long did the labor last?\"      2nd  5. FETAL MOVEMENT: \"Has the baby's movement decreased or changed significantly from normal?\"      unchanged  6. OTHER SYMPTOMS: \"Do you have any other symptoms?\" (e.g., leaking fluid from vagina, fever, hand/facial swelling)      none    Protocols used: PREGNANCY - LABOR - -ADULT-AH      "

## 2018-08-02 ENCOUNTER — NURSE TRIAGE (OUTPATIENT)
Dept: NURSING | Facility: CLINIC | Age: 33
End: 2018-08-02

## 2018-08-03 ENCOUNTER — NURSE TRIAGE (OUTPATIENT)
Dept: NURSING | Facility: CLINIC | Age: 33
End: 2018-08-03

## 2018-08-03 NOTE — TELEPHONE ENCOUNTER
"Patient calling reporting of having contractions. States having 6 contractions in the last hour. Denies fever. Denies bleeding.     Paged Dr. Thornton, on-call OBGYN for [facility] [place] through operating services at 11:12pm to call patient directly at [phone].    Advised patient to call FNA if no call from provider within 20 minutes. Caller verbalized understanding. Denies further questions.      Wes Gómez RN  [facility]       Reason for Disposition    Contractions < 10 minutes apart for 1 hour (i.e., 6 or more contractions an hour)    Additional Information    Negative: Passed out (i.e., lost consciousness, collapsed and was not responding)    Negative: Shock suspected (e.g., cold/pale/clammy skin, too weak to stand, low BP, rapid pulse)    Negative: Difficult to awaken or acting confused  (e.g., disoriented, slurred speech)    Negative: [1] SEVERE abdominal pain (e.g., excruciating) AND [2] constant AND [3] present > 1 hour    Negative: Severe bleeding (e.g., continuous red blood from vagina, or large blood clots)    Negative: Umbilical cord hanging out of the vagina (shiny, white, curled appearance, \"like telephone cord\")    Negative: Uncontrollable urge to push (i.e., feels like baby is coming out now)    Negative: Can see baby    Negative: Sounds like a life-threatening emergency to the triager    Negative: MODERATE-SEVERE abdominal pain    Protocols used: PREGNANCY - LABOR - -ADULT-AH      "

## 2018-08-03 NOTE — TELEPHONE ENCOUNTER
"  FNA Triage Call  Presenting Problem: From [id number] Pt called.   35 week pregnant , DRAKE 18 and followed by OB Dr Timmons at [facility] , scheduled C section on 18 at [facility].   Hx of irritable uterus .  Contractions started 5pm  last night , and spoke to OB provider  Dr Thornton .  Currently : intermittent   pelvic  achiness  since 7am with contractions  , and irregular  , mild contractions and  no vaginal discharge .    Guideline Used: PG - labor _  - adult   Patient Recommendations/Teaching: Paged Dr Hickman to Pt at 811am to phone    To discuss and advised laying on left side and counting fetal kicks and remain NPO , and  plan on getting ride to Hospital and  Pt to call back if no answer to page within 20 minutes or more problems. .  .Edith Espinoza RN [facility].           Reason for Disposition    Increased pressure in pelvic area    Additional Information    Negative: Passed out (i.e., lost consciousness, collapsed and was not responding)    Negative: Shock suspected (e.g., cold/pale/clammy skin, too weak to stand, low BP, rapid pulse)    Negative: Difficult to awaken or acting confused  (e.g., disoriented, slurred speech)    Negative: [1] SEVERE abdominal pain (e.g., excruciating) AND [2] constant AND [3] present > 1 hour    Negative: Severe bleeding (e.g., continuous red blood from vagina, or large blood clots)    Negative: Umbilical cord hanging out of the vagina (shiny, white, curled appearance, \"like telephone cord\")    Negative: Uncontrollable urge to push (i.e., feels like baby is coming out now)    Negative: Can see baby    Negative: Sounds like a life-threatening emergency to the triager    Negative: MODERATE-SEVERE abdominal pain    Negative: Contractions < 10 minutes apart for 1 hour (i.e., 6 or more contractions an hour)    Negative: [1] Contractions > 10 minutes apart AND [2] persist > 24 hours AND [3] no improvement using Care Advice    Negative: " [1] Contractions AND [2] any vaginal bleeding (including: red blood, clots, spotting, or pink/brown mucous)    Negative: Vaginal bleeding    (Exception: spotting after intercourse or pelvic exam, or slight pinkish or brownish mucous discharge)    Negative: Leakage of fluid from vagina    Negative: [1] Baby moving less today (e.g., kick count < 5 in 1 hour or < 10 in 2 hours) AND [2] pregnant 23 or more weeks    Negative: No movement of baby for 8 hours    Negative: New hand or face swelling    Negative: Fever > 100.4 F (38.0 C)    Protocols used: PREGNANCY - LABOR - -ADULT-

## 2018-08-06 ENCOUNTER — PRENATAL OFFICE VISIT (OUTPATIENT)
Dept: OBGYN | Facility: CLINIC | Age: 33
End: 2018-08-06
Payer: COMMERCIAL

## 2018-08-06 VITALS
SYSTOLIC BLOOD PRESSURE: 104 MMHG | DIASTOLIC BLOOD PRESSURE: 68 MMHG | BODY MASS INDEX: 25.95 KG/M2 | WEIGHT: 143 LBS | HEART RATE: 92 BPM

## 2018-08-06 DIAGNOSIS — O09.623 HIGH-RISK PREGNANCY, YOUNG MULTIGRAVIDA IN THIRD TRIMESTER: Primary | ICD-10-CM

## 2018-08-06 PROCEDURE — 99207 ZZC COMPLICATED OB VISIT: CPT | Performed by: OBSTETRICS & GYNECOLOGY

## 2018-08-06 PROCEDURE — 87653 STREP B DNA AMP PROBE: CPT | Performed by: OBSTETRICS & GYNECOLOGY

## 2018-08-06 NOTE — MR AVS SNAPSHOT
After Visit Summary   8/6/2018    Halie Rendon    MRN: [id number]           Patient Information     Date Of Birth          1985        Visit Information        Provider Department      8/6/2018 11:30 AM Domenic Timmons MD [facility]        Today's Diagnoses     High-risk pregnancy, young multigravida in third trimester    -  1       Follow-ups after your visit        Follow-up notes from your care team     Return in about 1 week (around 8/13/2018).      Your next 10 appointments already scheduled     Aug 10, 2018  3:30 PM CDT   MFM BPP SINGLE with [id number]   [facility] Maternal Fetal Medicine Ultrasound - [facility] ([facility])    [address] [place] [address]   [phone]            Aug 10, 2018  4:00 PM CDT   Radiology MD with RH AVERY WASHINGTON   [facility] Maternal Fetal Medicine [facility] ([facility])    [address] [place] [address]   [phone]           Please arrive at the time given for your first appointment. This visit is used internally to schedule the physician's time during your ultrasound.            Aug 13, 2018  3:30 PM CDT   ESTABLISHED PRENATAL with Domenic Timmons MD   [facility] ([facility])    [address] [place]  [facility] [address]   [phone]            Aug 17, 2018  3:30 PM CDT   MFM BPP SINGLE with [id number]   [facility] Maternal Fetal Medicine Ultrasound - [facility] ([facility])    [address]  [facility] [address]   [phone]            Aug 17, 2018  4:00 PM CDT   Radiology MD with RH MFYOBANI WASHINGTON   [facility] Maternal Fetal Medicine [facility] ([facility])    [address] [place] [address]   [phone]           Please arrive at the time given for your first appointment. This visit is used internally to schedule the physician's time during your ultrasound.             Aug 21, 2018  3:45 PM CDT   ESTABLISHED PRENATAL with Domenic Timmons MD   [facility] [place] ([facility])    [address] [facility]  [address]  [place] [address] [phone]            Aug 29, 2018  3:30 PM CDT   ESTABLISHED PRENATAL with Domenic Timmons MD   [facility] ([facility])    [address] [facility]  [address]  [place] [address] [phone]            Aug 31, 2018   Procedure with Domenic Timmons MD   [facility] Labor and Delivery (--)    [address]  [facility] [address] [phone]            Sep 12, 2018  2:00 PM CDT   SHORT with Domenic Timmons MD   [facility] ([facility])    [address] [place] [address]  [address]  [place] [address] [phone]              Who to contact     If you have questions or need follow up information about today's clinic visit or your schedule please contact [facility] directly at [phone].  Normal or non-critical lab and imaging results will be communicated to you by MyChart, letter or phone within 4 business days after the clinic has received the results. If you do not hear from us within 7 days, please contact the clinic through [employer] Gen[employer]hart or phone. If you have a critical or abnormal lab result, we will notify you by phone as soon as possible.  Submit refill requests through Orchard Platform or call your pharmacy and they will forward the refill request to us. Please allow 3 business days for your refill to be completed.          Additional Information About Your Visit        MyChart Information     Orchard Platform gives you secure access to your electronic health record. If you see a primary care provider, you can also send messages to your care team and make appointments. If you have questions, please call your primary care clinic.  If you do not have a primary care provider, please call [phone] and they will assist you.         Care EveryWhere ID     This is your Care EveryWhere ID. This could be used by other organizations to access your [facility] medical records  [id number]        Your Vitals Were     Pulse Last Period BMI (Body Mass Index)             92 11/25/2017 25.95 kg/m2          Blood Pressure from Last 3 Encounters:   08/06/18 104/68   07/25/18 102/60   07/11/18 108/70    Weight from Last 3 Encounters:   08/06/18 143 lb (64.9 kg)   07/25/18 141 lb 8 oz (64.2 kg)   07/11/18 139 lb (63 kg)              We Performed the Following     Strep, Group B by PCR        Primary Care Provider Office Phone # Fax #    Mary Andrade [phone] [address] [place] [address]        Equal Access to Services     [facility]: Hadii melania morales hadasho Soomaali, waaxda luqadaha, qaybta kaalmada adeharrisyasadaf, nany murcia . So [facility] [phone].    ATENCIÓN: Si habla español, tiene a loaiza disposición servicios gratuitos de asistencia lingüística. [facility] [phone].    We comply with applicable federal civil rights laws and [place] laws. We do not discriminate on the basis of race, color, national origin, age, disability, sex, sexual orientation, or gender identity.            Thank you!     Thank you for choosing [facility]  for your care. Our goal is always to provide you with excellent care. Hearing back from our patients is one way we can continue to improve our services. Please take a few minutes to complete the written survey that you may receive in the mail after your visit with us. Thank you!             Your Updated Medication List - Protect others around you: Learn how to safely use, store and throw away your medicines at www.disposemymeds.org.          This list is accurate as of 8/6/18 11:53 AM.  Always use your most recent med list.                   Brand Name Dispense Instructions for use Diagnosis    acetone (Urine) test Strp     50 each    1  strip as needed (with unexplained blood glucose over 300 mg/dL) Use one strip daily for 1 week, then weekly if negative.    Gestational diabetes       * blood glucose monitoring lancets     150 each    Use to test blood sugar 4 times daily.  Ok to substitute alternative if insurance prefers.    Gestational diabetes       * blood glucose monitoring lancets     102 each    1 each daily Use to test blood sugar 4 times daily or as directed.    Abnormal glucose tolerance test       * blood glucose monitoring meter device kit     1 kit    Use to test blood sugar 4 times daily.  Ok to substitute alternative if insurance prefers.    Gestational diabetes       * ONETOUCH VERIO FLEX SYSTEM w/Device Kit      1 kit 4 times daily Sample meter LOT: [id number] SN: [id number]    Gestational diabetes       blood glucose monitoring test strip    ONETOUCH VERIO IQ    150 strip    Use to test blood sugar 4 times daily.  Ok to substitute alternative if insurance prefers.    Gestational diabetes       prenatal multivitamin plus iron 27-0.8 MG Tabs per tablet     100 tablet    Take 1 tablet by mouth daily    High-risk pregnancy, young multigravida in first trimester       * Notice:  This list has 4 medication(s) that are the same as other medications prescribed for you. Read the directions carefully, and ask your doctor or other care provider to review them with you.

## 2018-08-06 NOTE — NURSING NOTE
"Chief Complaint   Patient presents with     Prenatal Care     some increased contractions - some vaginal discharge and possibly lost mucous plug - baby active -  18 - GBS     36w2d    Initial /68 (BP Location: Right arm, Patient Position: Chair, Cuff Size: Adult Regular)  Pulse 92  Wt 143 lb (64.9 kg)  LMP 2017  BMI 25.95 kg/m2 Estimated body mass index is 25.95 kg/(m^2) as calculated from the following:    Height as of 18: 5' 2.25\" (1.581 m).    Weight as of this encounter: 143 lb (64.9 kg).  Medication Reconciliation: complete     Nurse assisted visit.  Giselle Mckinney MA.      "

## 2018-08-07 ENCOUNTER — NURSE TRIAGE (OUTPATIENT)
Dept: NURSING | Facility: CLINIC | Age: 33
End: 2018-08-07

## 2018-08-07 ENCOUNTER — TELEPHONE (OUTPATIENT)
Dept: OBGYN | Facility: CLINIC | Age: 33
End: 2018-08-07

## 2018-08-07 LAB
GP B STREP DNA SPEC QL NAA+PROBE: NEGATIVE
SPECIMEN SOURCE: NORMAL

## 2018-08-08 NOTE — TELEPHONE ENCOUNTER
Spoke with patient re: mild discomfort at her Csection scar. Recommended ice, tylenol and monitor for new or worsening symptoms. Suspect this is from stretching based on her description. She reports it is not painful. If regular contractions, leakage of fluid, bleeding, or other concerns, she is to call back or come in for evaluation. She agrees with the plan.    Melinda Germain MD

## 2018-08-08 NOTE — TELEPHONE ENCOUNTER
Dr Germain paged at 8:46 pm    Halie Rendon, 1985, [id number]  36 weeks. Having pain at site of last c section. Asked to speak to the on call provider.  [phone].  Iris Ambrose will call back if no call received by 9:10 pm and request a second page.  [facility]     Reason for Disposition    MODERATE-SEVERE abdominal pain (e.g., interferes with normal activities, awakens from sleep)    Protocols used: PREGNANCY - ABDOMINAL PAIN GREATER THAN 20 WEEKS EGA-ADULT-  [facility]

## 2018-08-10 ENCOUNTER — HOSPITAL ENCOUNTER (OUTPATIENT)
Dept: ULTRASOUND IMAGING | Facility: CLINIC | Age: 33
Discharge: HOME | End: 2018-08-10
Attending: OBSTETRICS & GYNECOLOGY | Admitting: OBSTETRICS & GYNECOLOGY
Payer: COMMERCIAL

## 2018-08-10 ENCOUNTER — OFFICE VISIT (OUTPATIENT)
Dept: MATERNAL FETAL MEDICINE | Facility: CLINIC | Age: 33
End: 2018-08-10
Attending: OBSTETRICS & GYNECOLOGY
Payer: COMMERCIAL

## 2018-08-10 DIAGNOSIS — O24.410 DIET CONTROLLED GESTATIONAL DIABETES MELLITUS (GDM), ANTEPARTUM: Primary | ICD-10-CM

## 2018-08-10 DIAGNOSIS — O26.90 PREGNANCY RELATED CONDITION, ANTEPARTUM: ICD-10-CM

## 2018-08-10 PROCEDURE — 76819 FETAL BIOPHYS PROFIL W/O NST: CPT

## 2018-08-10 NOTE — PROGRESS NOTES
"Please see \"Imaging\" tab under Chart Review for full details.    Yisel Russell MD  Maternal Fetal Medicine    "

## 2018-08-10 NOTE — MR AVS SNAPSHOT
After Visit Summary   8/10/2018    Halie Rendon    MRN: [id number]           Patient Information     Date Of Birth          1985        Visit Information        Provider Department      8/10/2018 4:00 PM Yisel Russell MD [facility] Maternal Fetal Medicine [facility]        Today's Diagnoses     Diet controlled gestational diabetes mellitus (GDM), antepartum    -  1       Follow-ups after your visit        Your next 10 appointments already scheduled     Aug 13, 2018  3:30 PM CDT   ESTABLISHED PRENATAL with Domenic Timmons MD   [facility]    [address] [place]  [address]  [facility] [address]   [phone]            Aug 17, 2018  3:30 PM CDT   MFM US COMPRE SINGLE F/U with DANIELLEFMUSCLARA   [facility] Maternal Fetal Medicine Ultrasound - [facility])    [address]  [facility] [address] [phone]           Wear comfortable clothes and leave your valuables at home.            Aug 17, 2018  4:00 PM CDT   Radiology MD with  AVERY WASHINGTON   [facility] Maternal Fetal Medicine [facility] ([facility])    [address]  [facility] [address] [phone]           Please arrive at the time given for your first appointment. This visit is used internally to schedule the physician's time during your ultrasound.            Aug 21, 2018  3:45 PM CDT   ESTABLISHED PRENATAL with Domenic Timmons MD   [facility]    [address] [place] [address]  [address]  [facility] [id number] [phone]            Aug 29, 2018  3:30 PM CDT   ESTABLISHED PRENATAL with Domenic Timmons MD   [facility]    [address] [place] [address]  [address]  [facility] [address] [phone]            Aug 31, 2018   Procedure with Domenic Timmons MD   [facility] Labor and Delivery (--)    [address] [facility]  [address]   [phone]            Sep 12, 2018  2:00 PM CDT   SHORT with Domenic Timmons MD   [facility]    [address]  [place] [address] [phone]              Who to contact     If you have questions or need follow up information about today's clinic visit or your schedule please contact [facility] MATERNAL FETAL MEDICINE [facility] directly at [phone].  Normal or non-critical lab and imaging results will be communicated to you by QuickMobilehart, letter or phone within 4 business days after the clinic has received the results. If you do not hear from us within 7 days, please contact the clinic through Qirot or phone. If you have a critical or abnormal lab result, we will notify you by phone as soon as possible.  Submit refill requests through [employer] or call your pharmacy and they will forward the refill request to us. Please allow 3 business days for your refill to be completed.          Additional Information About Your Visit        QuickMobileharTsavo Media Information     [employer] gives you secure access to your electronic health record. If you see a primary care provider, you can also send messages to your care team and make appointments. If you have questions, please call your primary care clinic.  If you do not have a primary care provider, please call [phone] and they will assist you.        Care EveryWhere ID     This is your Care EveryWhere ID. This could be used by other organizations to access your [place] medical records  [id number]        Your Vitals Were     Last Period                   11/25/2017            Blood Pressure from Last 3 Encounters:   08/06/18 104/68   07/25/18 102/60   07/11/18 108/70    Weight from Last 3 Encounters:   08/06/18 64.9 kg (143 lb)   07/25/18 64.2 kg (141 lb 8 oz)   07/11/18 63 kg (139 lb)              Today, you had the following     No orders found for display       Primary Care Provider Office Phone #  Fax #    Domenic Timmons [phone] [address] [place] [address]        Equal Access to Services     JO AMADA : Hadslime velázquez andrew jose angel Maria, waofeda luqfran, qaybta kajordanda jeovany, nany crane. So [facility] [phone].    ATENCIÓN: Si habla español, tiene a loaiza disposición servicios gratuitos de asistencia lingüística. Llame al [phone].    We comply with applicable federal civil rights laws and [place] laws. We do not discriminate on the basis of race, color, national origin, age, disability, sex, sexual orientation, or gender identity.            Thank you!     Thank you for choosing [facility] MATERNAL FETAL [facility]IC[facility]  for your care. Our goal is always to provide you with excellent care. Hearing back from our patients is one way we can continue to improve our services. Please take a few minutes to complete the written survey that you may receive in the mail after your visit with us. Thank you!             Your Updated Medication List - Protect others around you: Learn how to safely use, store and throw away your medicines at www.disposemymeds.org.          This list is accurate as of 8/10/18  4:08 PM.  Always use your most recent med list.                   Brand Name Dispense Instructions for use Diagnosis    acetone (Urine) test Strp     50 each    1 strip as needed (with unexplained blood glucose over 300 mg/dL) Use one strip daily for 1 week, then weekly if negative.    Gestational diabetes       * blood glucose monitoring lancets     150 each    Use to test blood sugar 4 times daily.  Ok to substitute alternative if insurance prefers.    Gestational diabetes       * blood glucose monitoring lancets     102 each    1 each daily Use to test blood sugar 4 times daily or as directed.    Abnormal glucose tolerance test       * blood glucose monitoring meter device kit     1 kit    Use to test blood sugar 4 times daily.  Ok  to substitute alternative if insurance prefers.    Gestational diabetes       * ONETOUCH VERIO FLEX SYSTEM w/Device Kit      1 kit 4 times daily Sample meter LOT: [id number] SN: [id number]    Gestational diabetes       blood glucose monitoring test strip    ONETOUCH VERIO IQ    150 strip    Use to test blood sugar 4 times daily.  Ok to substitute alternative if insurance prefers.    Gestational diabetes       prenatal multivitamin plus iron 27-0.8 MG Tabs per tablet     100 tablet    Take 1 tablet by mouth daily    High-risk pregnancy, young multigravida in first trimester       * Notice:  This list has 4 medication(s) that are the same as other medications prescribed for you. Read the directions carefully, and ask your doctor or other care provider to review them with you.

## 2018-08-13 ENCOUNTER — PRENATAL OFFICE VISIT (OUTPATIENT)
Dept: OBGYN | Facility: CLINIC | Age: 33
End: 2018-08-13
Payer: COMMERCIAL

## 2018-08-13 VITALS — BODY MASS INDEX: 26.44 KG/M2 | DIASTOLIC BLOOD PRESSURE: 64 MMHG | WEIGHT: 145.7 LBS | SYSTOLIC BLOOD PRESSURE: 102 MMHG

## 2018-08-13 DIAGNOSIS — O09.623 HIGH-RISK PREGNANCY, YOUNG MULTIGRAVIDA IN THIRD TRIMESTER: Primary | ICD-10-CM

## 2018-08-13 PROCEDURE — 99207 ZZC COMPLICATED OB VISIT: CPT | Performed by: OBSTETRICS & GYNECOLOGY

## 2018-08-13 NOTE — PROGRESS NOTES
IUP at 37 2/7 weeks      -irregular contractions      -BPP (8/10/18) 8/8      -glucose; within parameters

## 2018-08-13 NOTE — MR AVS SNAPSHOT
After Visit Summary   8/13/2018    Halie Rendon    MRN: [id number]           Patient Information     Date Of Birth          1985        Visit Information        Provider Department      8/13/2018 3:30 PM Domenic Timmons MD [facility]        Today's Diagnoses     High-risk pregnancy, young multigravida in third trimester    -  1       Follow-ups after your visit        Follow-up notes from your care team     Return in about 1 week (around 8/20/2018).      Your next 10 appointments already scheduled     Aug 17, 2018  3:30 PM CDT   MFM US COMPRE SINGLE F[facility]   [facility] Maternal Fetal Medicine Ultrasound - [facility])    [address]  [facility] [address]   [phone]           Wear comfortable clothes and leave your valuables at home.            Aug 17, 2018  4:00 PM CDT   Radiology [facility] with [facility]   [facility] Maternal Fetal Medicine - [facility])    [address]  [facility] [address] [phone]           Please arrive at the time given for your first appointment. This visit is used internally to schedule the physician's time during your ultrasound.            Aug 21, 2018  3:45 PM CDT   ESTABLISHED PRENATAL with Domenic Timmons MD   [facility]    [address] [place] [address]  [address]  [facility] [address]   [phone]            Aug 29, 2018  3:30 PM CDT   ESTABLISHED PRENATAL with Domenic Timmons MD   [facility])    [address] [place] [address]  [address]  [facility] [address]   [phone]            Aug 31, 2018   Procedure with Domenic Timmons MD   [facility] Labor and Delivery (--)    [address]  [facility] [address]   [phone]            Sep 12, 2018  2:00 PM CDT   SHORT with Domenic Tmimons MD   [facility])     [address]  [address]  [place] [address] [phone]              Who to contact     If you have questions or need follow up information about today's clinic visit or your schedule please contact [facility] directly at [phone].  Normal or non-critical lab and imaging results will be communicated to you by MyChart, letter or phone within 4 business days after the clinic has received the results. If you do not hear from us within 7 days, please contact the clinic through MyChart or phone. If you have a critical or abnormal lab result, we will notify you by phone as soon as possible.  Submit refill requests through [employer] or call your pharmacy and they will forward the refill request to us. Please allow 3 business days for your refill to be completed.          Additional Information About Your Visit        MyChart Information     [employer] gives you secure access to your electronic health record. If you see a primary care provider, you can also send messages to your care team and make appointments. If you have questions, please call your primary care clinic.  If you do not have a primary care provider, please call [phone] and they will assist you.        Care EveryWhere ID     This is your Care EveryWhere ID. This could be used by other organizations to access your [place] medical records  [id number]        Your Vitals Were     Last Period BMI (Body Mass Index)                11/25/2017 26.44 kg/m2           Blood Pressure from Last 3 Encounters:   08/13/18 102/64   08/06/18 104/68   07/25/18 102/60    Weight from Last 3 Encounters:   08/13/18 145 lb 11.2 oz (66.1 kg)   08/06/18 143 lb (64.9 kg)   07/25/18 141 lb 8 oz (64.2 kg)              Today, you had the following     No orders found for display       Primary Care Provider Office Phone # Fax #    Domenic Timmons [phone] [address] [place] [address]        Equal  Access to Services     [facility]: Hadii melania morales jose angel Maria, waaxda luqadaha, qaybta kaalmasadaf thayer, nany crane. So [facility] [phone].    ATENCIÓN: Si habla español, tiene a loaiza disposición servicios gratuitos de asistencia lingüística. Llame al [phone].    We comply with applicable federal civil rights laws and [place] laws. We do not discriminate on the basis of race, color, national origin, age, disability, sex, sexual orientation, or gender identity.            Thank you!     Thank you for choosing [facility]  for your care. Our goal is always to provide you with excellent care. Hearing back from our patients is one way we can continue to improve our services. Please take a few minutes to complete the written survey that you may receive in the mail after your visit with us. Thank you!             Your Updated Medication List - Protect others around you: Learn how to safely use, store and throw away your medicines at www.disposemymeds.org.          This list is accurate as of 8/13/18  4:06 PM.  Always use your most recent med list.                   Brand Name Dispense Instructions for use Diagnosis    acetone (Urine) test Strp     50 each    1 strip as needed (with unexplained blood glucose over 300 mg/dL) Use one strip daily for 1 week, then weekly if negative.    Gestational diabetes       * blood glucose monitoring lancets     150 each    Use to test blood sugar 4 times daily.  Ok to substitute alternative if insurance prefers.    Gestational diabetes       * blood glucose monitoring lancets     102 each    1 each daily Use to test blood sugar 4 times daily or as directed.    Abnormal glucose tolerance test       * blood glucose monitoring meter device kit     1 kit    Use to test blood sugar 4 times daily.  Ok to substitute alternative if insurance prefers.    Gestational diabetes       * ONETOUCH VERIO FLEX SYSTEM w/Device Kit      1 kit 4  times daily Sample meter LOT: [id number] SN: [id number]    Gestational diabetes       blood glucose monitoring test strip    ONETOUCH VERIO IQ    150 strip    Use to test blood sugar 4 times daily.  Ok to substitute alternative if insurance prefers.    Gestational diabetes       prenatal multivitamin plus iron 27-0.8 MG Tabs per tablet     100 tablet    Take 1 tablet by mouth daily    High-risk pregnancy, young multigravida in first trimester       * Notice:  This list has 4 medication(s) that are the same as other medications prescribed for you. Read the directions carefully, and ask your doctor or other care provider to review them with you.

## 2018-08-13 NOTE — NURSING NOTE
"Chief Complaint   Patient presents with     Prenatal Care     37w 2d       Initial /64  Wt 145 lb 11.2 oz (66.1 kg)  LMP 2017  BMI 26.44 kg/m2 Estimated body mass index is 26.44 kg/(m^2) as calculated from the following:    Height as of 18: 5' 2.25\" (1.581 m).    Weight as of this encounter: 145 lb 11.2 oz (66.1 kg).  BP completed using cuff size: regular        Rashmi Marcos, LAURITA     "

## 2018-08-17 ENCOUNTER — OFFICE VISIT (OUTPATIENT)
Dept: MATERNAL FETAL MEDICINE | Facility: CLINIC | Age: 33
End: 2018-08-17
Attending: OBSTETRICS & GYNECOLOGY
Payer: COMMERCIAL

## 2018-08-17 ENCOUNTER — HOSPITAL ENCOUNTER (OUTPATIENT)
Dept: ULTRASOUND IMAGING | Facility: CLINIC | Age: 33
Discharge: HOME | End: 2018-08-17
Attending: OBSTETRICS & GYNECOLOGY | Admitting: OBSTETRICS & GYNECOLOGY
Payer: COMMERCIAL

## 2018-08-17 DIAGNOSIS — O24.410 DIET CONTROLLED GESTATIONAL DIABETES MELLITUS (GDM), ANTEPARTUM: Primary | ICD-10-CM

## 2018-08-17 DIAGNOSIS — O26.90 PREGNANCY RELATED CONDITION, ANTEPARTUM: ICD-10-CM

## 2018-08-17 PROCEDURE — 76816 OB US FOLLOW-UP PER FETUS: CPT

## 2018-08-17 PROCEDURE — 76819 FETAL BIOPHYS PROFIL W/O NST: CPT | Performed by: OBSTETRICS & GYNECOLOGY

## 2018-08-17 NOTE — PROGRESS NOTES
"Please see \"Imaging\" tab under \"Chart Review\" for details of today's US at the [facility].    Chuy Whitfield MD  Maternal-Fetal Medicine    "

## 2018-08-17 NOTE — MR AVS SNAPSHOT
MRN:[id number]                      After Visit Summary   8/17/2018    Halie Rendon    MRN: [id number]           Visit Information        Provider Department      8/17/2018 4:00 PM Chuy Whitfield MD [facility] Maternal Fetal Medicine [facility]        Your next 10 appointments already scheduled     Aug 21, 2018  3:45 PM CDT   ESTABLISHED PRENAT[facility]    [address] [place] [address]  [address]  [facility] [address]   [phone]            Aug 23, 2018 11:00 AM CDT   MFM BPP SINGLE with [email or web address]   [facility] Maternal Fetal Medicine Ultrasound - [facility] ([facility])    [address] [place] [address] [address]  [facility] [address] [phone]            Aug 23, 2018 11:30 AM CDT   Radiology MD with  AVERY WASHINGTON   [facility] Maternal Fetal Medicine [facility])    [address] [place] [address]  [facility] [address] [phone]           Please arrive at the time given for your first appointment. This visit is used internally to schedule the physician's time during your ultrasound.            Aug 29, 2018  3:30 PM CDT   ESTABLISHED PRENATAL with Domenic Timmons MD   [facility]    [address] [place] [address]  [address]  [place] [id number] [phone]            Aug 31, 2018   Procedure with Domenic Timmons MD   [facility] Labor and Delivery (--)    [address] [facility] [address]   [phone]            Sep 12, 2018  2:00 PM CDT   SHORT with D[facility]ic [facility] MD   [facility]    [address] [place] [address]  [address]  [facility] [address] [phone]              MyChart Information     [employer] gives you secure access to your electronic health record. If you see a primary care provider, you can also send messages to your care team and make appointments. If you have  questions, please call your primary care clinic.  If you do not have a primary care provider, please call [phone] and they will assist you.        Care EveryWhere ID     This is your Care EveryWhere ID. This could be used by other organizations to access your [place] medical records  [id number]        Equal Access to Services     LEONARD YBARRA : Delroy Maria, bere horne, nany conway. So [facility] [phone].    ATENCIÓN: Si habla español, tiene a loaiza disposición servicios gratuitos de asistencia lingüística. Llame al [phone].    We comply with applicable federal civil rights laws and [place] laws. We do not discriminate on the basis of race, color, national origin, age, disability, sex, sexual orientation, or gender identity.

## 2018-08-21 ENCOUNTER — PRENATAL OFFICE VISIT (OUTPATIENT)
Dept: OBGYN | Facility: CLINIC | Age: 33
End: 2018-08-21
Payer: COMMERCIAL

## 2018-08-21 VITALS
BODY MASS INDEX: 26.31 KG/M2 | SYSTOLIC BLOOD PRESSURE: 102 MMHG | HEART RATE: 88 BPM | WEIGHT: 145 LBS | DIASTOLIC BLOOD PRESSURE: 64 MMHG

## 2018-08-21 DIAGNOSIS — Z01.818 PREOP GENERAL PHYSICAL EXAM: ICD-10-CM

## 2018-08-21 DIAGNOSIS — O09.623 HIGH-RISK PREGNANCY, YOUNG MULTIGRAVIDA IN THIRD TRIMESTER: Primary | ICD-10-CM

## 2018-08-21 PROCEDURE — 99207 ZZC COMPLICATED OB VISIT: CPT | Performed by: OBSTETRICS & GYNECOLOGY

## 2018-08-21 NOTE — PROGRESS NOTES
[facility] [place]  [address]  [address]  [place] [address]  [phone]  Dept: [phone]    PRE-OP EVALUATION:  Today's date: 2018    Halie Rendon (: 1985) presents for pre-operative evaluation assessment as requested by Dr. Timmons.  She requires evaluation and anesthesia risk assessment prior to undergoing surgery/procedure for treatment of repeat  .    Proposed Surgery/ Procedure: repeat   Date of Surgery/ Procedure: 18  Time of Surgery/ Procedure: 7:00am  Hospital/Surgical Facility: [facility]    Primary Physician: Domenic Timmons  Type of Anesthesia Anticipated: Spinal    Patient has a Health Care Directive or Living Will:  NO    1. NO - Do you have a history of heart attack, stroke, stent, bypass or surgery on an artery in the head, neck, heart or legs?  2. NO - Do you ever have any pain or discomfort in your chest?  3. NO - Do you have a history of  Heart Failure?  4. NO - Are you troubled by shortness of breath when: walking on the level, up a slight hill or at night?  5. NO - Do you currently have a cold, bronchitis or other respiratory infection?  6. NO - Do you have a cough, shortness of breath or wheezing?  7. NO - Do you sometimes get pains in the calves of your legs when you walk?  8. NO - Do you or anyone in your family have previous history of blood clots?  9. NO - Do you or does anyone in your family have a serious bleeding problem such as prolonged bleeding following surgeries or cuts?  10. NO - Have you ever had problems with anemia or been told to take iron pills?  11. NO - Have you had any abnormal blood loss such as black, tarry or bloody stools, or abnormal vaginal bleeding?  12. NO - Have you ever had a blood transfusion?  13. NO - Have you or any of your relatives ever had problems with anesthesia?  14. NO - Do you have sleep apnea, excessive snoring or daytime drowsiness?  15. NO - Do you have any prosthetic  heart valves?  16. NO - Do you have prosthetic joints?  17. YES - IS THERE ANY CHANCE THAT YOU MAY BE PREGNANT? Pt is pregnant    Nurse assisted visit.  Giselle Mckinney MA.        HPI:     HPI related to upcoming procedure: previous C/S; desires repeat      See problem list for active medical problems.  Problems all longstanding and stable, except as noted/documented.  See ROS for pertinent symptoms related to these conditions.                                                                                                                                                          .    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 States that she first cousin has autism.        Past Medical History:   Diagnosis Date     Gestational diabetes mellitus     diet controlled     Nephrolithiasis     has a kidney stone     Varicella vaccination     documented immunity     Past Surgical History:   Procedure Laterality Date      SECTION N/A 10/2/2014    Procedure:  SECTION;  Surgeon: Domenic Timmons MD;  Location:  L+D     DENTAL SURGERY       Current Outpatient Prescriptions   Medication Sig Dispense Refill     Prenatal Vit-Fe Fumarate-FA (PRENATAL MULTIVITAMIN  PLUS IRON) 27-0.8 MG TABS per tablet Take 1 tablet by mouth daily 100 tablet 3     acetone, Urine, test STRP 1 strip as needed (with unexplained blood glucose over 300 mg/dL) Use one strip daily for 1 week, then weekly if negative. 50 each 3     blood glucose monitoring (ACCU-CHEK FASTCLIX) lancets 1 each daily Use to test blood sugar 4 times daily or as directed. 102 each 3     blood glucose monitoring (ONE TOUCH ULTRASOFT) lancets Use to test blood sugar 4 times daily.  Ok to substitute alternative if insurance prefers. 150 each 5     blood glucose monitoring (ONETOUCH VERIO IQ) test strip Use to test blood sugar 4 times daily.  Ok to substitute alternative if insurance prefers. 150 strip 5     blood glucose monitoring (ONETOUCH VERIO) meter device kit Use to test blood sugar 4 times daily.  Ok to substitute alternative if insurance prefers. 1 kit 0     Blood Glucose Monitoring Suppl (ONETOUCH VERIO FLEX SYSTEM) w/Device KIT 1 kit 4 times daily Sample meter  LOT: [id number]  SN: [email or web address]       OTC products: None, except as noted above    No Known Allergies   Latex Allergy: NO    Social History   Substance Use Topics     Smoking status: Former Smoker     Smokeless tobacco: Never Used      Comment: rare cigarette use     Alcohol use No      Comment: rare     History   Drug Use No     Comment: Did smoke appx. 10 years ago       REVIEW OF SYSTEMS:   Constitutional, neuro, ENT, endocrine, pulmonary, cardiac, gastrointestinal, genitourinary, musculoskeletal, integument and psychiatric systems are negative, except as otherwise noted.    EXAM:   /64 (BP Location: Right arm, Patient Position: Chair, Cuff Size: Adult Regular)  Pulse 88  Wt 145 lb (65.8 kg)  LMP 11/25/2017  BMI 26.31 kg/m2    GENERAL APPEARANCE: healthy, alert and no distress     EYES: EOMI, PERRL     HENT: ear canals and TM's normal and nose and mouth without ulcers or lesions     NECK: no adenopathy, no asymmetry, masses, or scars and thyroid  normal to palpation     RESP: lungs clear to auscultation - no rales, rhonchi or wheezes     CV: regular rates and rhythm, normal S1 S2, no S3 or S4 and no murmur, click or rub     ABDOMEN:  IUP at term     MS: extremities normal- no gross deformities noted, no evidence of inflammation in joints, FROM in all extremities.     SKIN: no suspicious lesions or rashes     NEURO: Normal strength and tone, sensory exam grossly normal, mentation intact and speech normal     PSYCH: mentation appears normal. and affect normal/bright     LYMPHATICS: No cervical adenopathy    DIAGNOSTICS:       Recent Labs   Lab Test  07/25/18   1558  04/11/18   1630  01/16/18   1153  12/14/15   0812   10/14/15   0750   HGB  12.8  11.6*  13.6  14.8   --    --    PLT  199  245  284  275   < >   --    NA   --    --    --   139   --    --    POTASSIUM   --    --    --   3.7   --    --    CR   --    --    --   0.64   --    --    A1C   --    --   5.0   --    --   5.2    < > = values in this interval not displayed.        IMPRESSION:   IUP at 39 weeks (at time of planned C/S)      -previous LTCS, desires repeat       ICD-10-CM    1. High-risk pregnancy, young multigravida in third trimester O09.623    2. Preop general physical exam Z01.818        RECOMMENDATIONS:   Repeat LTCS      -Risks, benefits, alternatives discussed; including but not limited to risk of infection, bleeding, damage to bowel/bladder and any other intra-abdominal viscera.  Risks also include risks of anesthesia and blood transfusion. Patient understands and agrees to planned procedure.

## 2018-08-21 NOTE — MR AVS SNAPSHOT
After Visit Summary   8/21/2018    Halie Rendon    MRN: [id number]           Patient Information     Date Of Birth          1985        Visit Information        Provider Department      8/21/2018 3:45 PM Domenic Timmons MD [facility]        Today's Diagnoses     High-risk pregnancy, young multigravida in third trimester    -  1    Preop general physical exam          Care Instructions      Before Your Surgery      Call your surgeon if there is any change in your health. This includes signs of a cold or flu (such as a sore throat, runny nose, cough, rash or fever).    Do not smoke, drink alcohol or take over the counter medicine (unless your surgeon or primary care doctor tells you to) for the 24 hours before and after surgery.    If you take prescribed drugs: Follow your doctor s orders about which medicines to take and which to stop until after surgery.    Eating and drinking prior to surgery: follow the instructions from your surgeon    Take a shower or bath the night before surgery. Use the soap your surgeon gave you to gently clean your skin. If you do not have soap from your surgeon, use your regular soap. Do not shave or scrub the surgery site.  Wear clean pajamas and have clean sheets on your bed.           Follow-ups after your visit        Follow-up notes from your care team     Return in about 1 week (around 8/28/2018).      Your next 10 appointments already scheduled     Aug 23, 2018 11:00 AM CDT   AVERY BARAKATP SINGLE with [id number]   [facility] - [facility])    [address] [place] [address]   [phone]            Aug 23, 2018 11:30 AM CDT   Radiology MD with HAMLET VARELA MD   [facility] ([facility])    [address] [address]   [phone]           Please arrive at the time given for your first  appointment. This visit is used internally to schedule the physician's time during your ultrasound.            Aug 29, 2018  3:30 PM CDT   ESTABLISHED PRENATAL with Domenic Timmons MD   [facility]    [address]  [address]  [place] [address] [phone]            Aug 31, 2018   Procedure with Domenic Timmons MD   [facility] Labor and Delivery (--)    [address]  [facility] [id number] [phone]            Sep 12, 2018  2:00 PM CDT   SHORT with Domenic Timmons MD   [facility] ([facility])    [address]  [address]  [place] [address] [phone]              Who to contact     If you have questions or need follow up information about today's clinic visit or your schedule please contact [facility] directly at [phone].  Normal or non-critical lab and imaging results will be communicated to you by Animated Speechhart, letter or phone within 4 business days after the clinic has received the results. If you do not hear from us within 7 days, please contact the clinic through KS12 or phone. If you have a critical or abnormal lab result, we will notify you by phone as soon as possible.  Submit refill requests through KS12 or call your pharmacy and they will forward the refill request to us. Please allow 3 business days for your refill to be completed.          Additional Information About Your Visit        KS12 Information     KS12 gives you secure access to your electronic health record. If you see a primary care provider, you can also send messages to your care team and make appointments. If you have questions, please call your primary care clinic.  If you do not have a primary care provider, please call [phone] and they will assist you.        Care EveryWhere ID     This is your Care EveryWhere ID. This could be used by other organizations to access your  [facility] medical records  [id number]        Your Vitals Were     Pulse Last Period BMI (Body Mass Index)             88 11/25/2017 26.31 kg/m2          Blood Pressure from Last 3 Encounters:   08/21/18 102/64   08/13/18 102/64   08/06/18 104/68    Weight from Last 3 Encounters:   08/21/18 145 lb (65.8 kg)   08/13/18 145 lb 11.2 oz (66.1 kg)   08/06/18 143 lb (64.9 kg)              Today, you had the following     No orders found for display       Primary Care Provider Office Phone # Fax #    Domenic Timmons [phone] [address] [place] [address]        Equal Access to Services     [facility]: Hadii melania morales hadasho Soomaali, waaxda luqadaha, qaybta kaalmada adeegyada, waxjaney murcia . So [facility] [phone].    ATENCIÓN: Si habla español, tiene a loaiza disposición servicios gratuitos de asistencia lingüística. Llame al [phone].    We comply with applicable federal civil rights laws and [place] laws. We do not discriminate on the basis of race, color, national origin, age, disability, sex, sexual orientation, or gender identity.            Thank you!     Thank you for choosing [facility]  for your care. Our goal is always to provide you with excellent care. Hearing back from our patients is one way we can continue to improve our services. Please take a few minutes to complete the written survey that you may receive in the mail after your visit with us. Thank you!             Your Updated Medication List - Protect others around you: Learn how to safely use, store and throw away your medicines at www.disposemymeds.org.          This list is accurate as of 8/21/18  4:41 PM.  Always use your most recent med list.                   Brand Name Dispense Instructions for use Diagnosis    acetone (Urine) test Strp     50 each    1 strip as needed (with unexplained blood glucose over 300 mg/dL) Use one strip daily for 1 week, then weekly if  negative.    Gestational diabetes       * blood glucose monitoring lancets     150 each    Use to test blood sugar 4 times daily.  Ok to substitute alternative if insurance prefers.    Gestational diabetes       * blood glucose monitoring lancets     102 each    1 each daily Use to test blood sugar 4 times daily or as directed.    Abnormal glucose tolerance test       * blood glucose monitoring meter device kit     1 kit    Use to test blood sugar 4 times daily.  Ok to substitute alternative if insurance prefers.    Gestational diabetes       * ONETOUCH VERIO FLEX SYSTEM w/Device Kit      1 kit 4 times daily Sample meter LOT: [id number] SN: [id number]    Gestational diabetes       blood glucose monitoring test strip    ONETOUCH VERIO IQ    150 strip    Use to test blood sugar 4 times daily.  Ok to substitute alternative if insurance prefers.    Gestational diabetes       prenatal multivitamin plus iron 27-0.8 MG Tabs per tablet     100 tablet    Take 1 tablet by mouth daily    High-risk pregnancy, young multigravida in first trimester       * Notice:  This list has 4 medication(s) that are the same as other medications prescribed for you. Read the directions carefully, and ask your doctor or other care provider to review them with you.

## 2018-08-23 ENCOUNTER — HOSPITAL ENCOUNTER (OUTPATIENT)
Dept: ULTRASOUND IMAGING | Facility: CLINIC | Age: 33
Discharge: HOME | End: 2018-08-23
Attending: OBSTETRICS & GYNECOLOGY | Admitting: OBSTETRICS & GYNECOLOGY
Payer: COMMERCIAL

## 2018-08-23 ENCOUNTER — OFFICE VISIT (OUTPATIENT)
Dept: MATERNAL FETAL MEDICINE | Facility: CLINIC | Age: 33
End: 2018-08-23
Attending: OBSTETRICS & GYNECOLOGY
Payer: COMMERCIAL

## 2018-08-23 DIAGNOSIS — O24.410 DIET CONTROLLED GESTATIONAL DIABETES MELLITUS (GDM), ANTEPARTUM: Primary | ICD-10-CM

## 2018-08-23 DIAGNOSIS — O24.410 DIET CONTROLLED GESTATIONAL DIABETES MELLITUS (GDM), ANTEPARTUM: ICD-10-CM

## 2018-08-23 PROCEDURE — 76819 FETAL BIOPHYS PROFIL W/O NST: CPT

## 2018-08-23 NOTE — MR AVS SNAPSHOT
After Visit Summary   8/23/2018    Halie Rendon    MRN: [id number]           Patient Information     Date Of Birth          1985        Visit Information        Provider Department      8/23/2018 11:30 AM Roberta Churchill DO [facility] Maternal Fetal Medicine [facility]        Today's Diagnoses     Diet controlled gestational diabetes mellitus (GDM), antepartum    -  1       Follow-ups after your visit        Your next 10 appointments already scheduled     Aug 29, 2018  3:30 PM CDT   ESTABLISHED PRENATAL with Domenic Timmons MD   [facility])    [address] [place] [address]  [address]  [facility] [address]   [phone]            Aug 30, 2018  3:30 PM CDT   MFM BPP SINGLE with [id number]   [facility] Maternal Fetal Medicine Ultrasound [facility] ([facility])    [address] [place] [address] [address]  [facility] [address]   [phone]            Aug 30, 2018  4:00 PM CDT   Radiology MD with  AVERY WASHINGTON   [facility] Maternal Fetal Medicine [facility] ([facility])    [address] [place] [address]  [address]  [facility] [address]   [phone]           Please arrive at the time given for your first appointment. This visit is used internally to schedule the physician's time during your ultrasound.            Aug 31, 2018   Procedure with Domenic Timmons MD   [facility] Labor and Delivery (--)    [address]  [facility] [address]   [phone]            Sep 12, 2018  2:00 PM CDT   SHORT with Domenic Timmons MD   [facility] ([facility])    [address] [place] [address]  [address]  [facility] [address]   [phone]              Future tests that were ordered for you today     Open Future Orders        Priority Expected Expires Ordered    MFM BPP Single Routine 8/30/2018 6/23/2019 8/23/2018            Who to contact     If you have questions or need follow up information about  today's clinic visit or your schedule please contact [facility] directly at [phone].  Normal or non-critical lab and imaging results will be communicated to you by Thinkorswim Grouphart, letter or phone within 4 business days after the clinic has received the results. If you do not hear from us within 7 days, please contact the clinic through Thinkorswim Grouphart or phone. If you have a critical or abnormal lab result, we will notify you by phone as soon as possible.  Submit refill requests through Vouch or call your pharmacy and they will forward the refill request to us. Please allow 3 business days for your refill to be completed.          Additional Information About Your Visit        Vouch Information     Vouch gives you secure access to your electronic health record. If you see a primary care provider, you can also send messages to your care team and make appointments. If you have questions, please call your primary care clinic.  If you do not have a primary care provider, please call [phone] and they will assist you.        Care EveryWhere ID     This is your Care EveryWhere ID. This could be used by other organizations to access your [place] medical records  [id number]        Your Vitals Were     Last Period                   11/25/2017            Blood Pressure from Last 3 Encounters:   08/21/18 102/64   08/13/18 102/64   08/06/18 104/68    Weight from Last 3 Encounters:   08/21/18 65.8 kg (145 lb)   08/13/18 66.1 kg (145 lb 11.2 oz)   08/06/18 64.9 kg (143 lb)               Primary Care Provider Office Phone # Fax #    Domenic Timmons [phone] [address] [place] [address]        Equal Access to Services     [facility]: Hadii melania walter Solisa, waaxda luqadaha, qaybta kaalmada nany thayer. So [facility] [phone].    ATENCIÓN: Si habla español, tiene a loaiza disposición servicios gratuitos de  germaine lingüísticgeorgette. Blaze al [phone].    We comply with applicable federal civil rights laws and [place] laws. We do not discriminate on the basis of race, color, national origin, age, disability, sex, sexual orientation, or gender identity.            Thank you!     Thank you for choosing [facility]  for your care. Our goal is always to provide you with excellent care. Hearing back from our patients is one way we can continue to improve our services. Please take a few minutes to complete the written survey that you may receive in the mail after your visit with us. Thank you!             Your Updated Medication List - Protect others around you: Learn how to safely use, store and throw away your medicines at www.disposemymeds.org.          This list is accurate as of 8/23/18 11:54 AM.  Always use your most recent med list.                   Brand Name Dispense Instructions for use Diagnosis    acetone (Urine) test Strp     50 each    1 strip as needed (with unexplained blood glucose over 300 mg/dL) Use one strip daily for 1 week, then weekly if negative.    Gestational diabetes       * blood glucose monitoring lancets     150 each    Use to test blood sugar 4 times daily.  Ok to substitute alternative if insurance prefers.    Gestational diabetes       * blood glucose monitoring lancets     102 each    1 each daily Use to test blood sugar 4 times daily or as directed.    Abnormal glucose tolerance test       * blood glucose monitoring meter device kit     1 kit    Use to test blood sugar 4 times daily.  Ok to substitute alternative if insurance prefers.    Gestational diabetes       * ONETOUCH VERIO FLEX SYSTEM w/Device Kit      1 kit 4 times daily Sample meter LOT: [id number] SN: [id number]    Gestational diabetes       blood glucose monitoring test strip    ONETOUCH VERIO IQ    150 strip    Use to test blood sugar 4 times daily.  Ok to substitute alternative if insurance  prefers.    Gestational diabetes       prenatal multivitamin plus iron 27-0.8 MG Tabs per tablet     100 tablet    Take 1 tablet by mouth daily    High-risk pregnancy, young multigravida in first trimester       * Notice:  This list has 4 medication(s) that are the same as other medications prescribed for you. Read the directions carefully, and ask your doctor or other care provider to review them with you.

## 2018-08-28 NOTE — PHARMACY-ADMISSION MEDICATION HISTORY
Admission medication history interview status for this patient is complete. See [facility] admission navigator for allergy information, prior to admission medications and immunization status.     PTA med list completed by pre-admitting nurse,           Nurse Jenny Chávez RN Fri Aug 24, 2018  5:23 PM       Prior to Admission medications    Medication Sig Last Dose Taking? Auth Provider   acetone, Urine, test STRP 1 strip as needed (with unexplained blood glucose over 300 mg/dL) Use one strip daily for 1 week, then weekly if negative.   Domenic Timmons MD   blood glucose monitoring (ACCU-CHEK FASTCLIX) lancets 1 each daily Use to test blood sugar 4 times daily or as directed.   Domenic Timmons MD   blood glucose monitoring (ONE TOUCH ULTRASOFT) lancets Use to test blood sugar 4 times daily.  Ok to substitute alternative if insurance prefers.   Domenic Timmons MD   blood glucose monitoring (ONETOUCH VERIO IQ) test strip Use to test blood sugar 4 times daily.  Ok to substitute alternative if insurance prefers.   Domenic Timmons MD   blood glucose monitoring (ONETOUCH VERIO) meter device kit Use to test blood sugar 4 times daily.  Ok to substitute alternative if insurance prefers.   Domenic Timmons MD   Blood Glucose Monitoring Suppl (ONETOUCH VERIO FLEX SYSTEM) w/Device KIT 1 kit 4 times daily Sample meter  LOT: [id number]  SN: [email or web address]   Domenic Timmons MD   Prenatal Vit-Fe Fumarate-FA (PRENATAL MULTIVITAMIN PLUS IRON) 27-0.8 MG TABS per tablet Take 1 tablet by mouth daily   Domenic Timmons MD

## 2018-08-28 NOTE — H&P (VIEW-ONLY)
[facility]  [address]  [address]  [place] [address]  [phone]  Dept: [phone]    PRE-OP EVALUATION:  Today's date: 2018    Halie Rendon (: 1985) presents for pre-operative evaluation assessment as requested by Dr. Timmons.  She requires evaluation and anesthesia risk assessment prior to undergoing surgery/procedure for treatment of repeat  .    Proposed Surgery/ Procedure: repeat   Date of Surgery/ Procedure: 18  Time of Surgery/ Procedure: 7:00am  Hospital/Surgical Facility: [facility]    Primary Physician: Domenic Timmons  Type of Anesthesia Anticipated: Spinal    Patient has a Health Care Directive or Living Will:  NO    1. NO - Do you have a history of heart attack, stroke, stent, bypass or surgery on an artery in the head, neck, heart or legs?  2. NO - Do you ever have any pain or discomfort in your chest?  3. NO - Do you have a history of  Heart Failure?  4. NO - Are you troubled by shortness of breath when: walking on the level, up a slight hill or at night?  5. NO - Do you currently have a cold, bronchitis or other respiratory infection?  6. NO - Do you have a cough, shortness of breath or wheezing?  7. NO - Do you sometimes get pains in the calves of your legs when you walk?  8. NO - Do you or anyone in your family have previous history of blood clots?  9. NO - Do you or does anyone in your family have a serious bleeding problem such as prolonged bleeding following surgeries or cuts?  10. NO - Have you ever had problems with anemia or been told to take iron pills?  11. NO - Have you had any abnormal blood loss such as black, tarry or bloody stools, or abnormal vaginal bleeding?  12. NO - Have you ever had a blood transfusion?  13. NO - Have you or any of your relatives ever had problems with anesthesia?  14. NO - Do you have sleep apnea, excessive snoring or daytime drowsiness?  15. NO - Do you have any prosthetic  heart valves?  16. NO - Do you have prosthetic joints?  17. YES - IS THERE ANY CHANCE THAT YOU MAY BE PREGNANT? Pt is pregnant    Nurse assisted visit.  Giselle Mckinney MA.        HPI:     HPI related to upcoming procedure: previous C/S; desires repeat      See problem list for active medical problems.  Problems all longstanding and stable, except as noted/documented.  See ROS for pertinent symptoms related to these conditions.                                                                                                                                                          .    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 States that she first cousin has autism.        Past Medical History:   Diagnosis Date     Gestational diabetes mellitus     diet controlled     Nephrolithiasis     has a kidney stone     Varicella vaccination     documented immunity     Past Surgical History:   Procedure Laterality Date      SECTION N/A 10/2/2014    Procedure:  SECTION;  Surgeon: Domenic Timmons MD;  Location:  L+D     DENTAL SURGERY       Current Outpatient Prescriptions   Medication Sig Dispense Refill     Prenatal Vit-Fe Fumarate-FA (PRENATAL MULTIVITAMIN  PLUS IRON) 27-0.8 MG TABS per tablet Take 1 tablet by mouth daily 100 tablet 3     acetone, Urine, test STRP 1 strip as needed (with unexplained blood glucose over 300 mg/dL) Use one strip daily for 1 week, then weekly if negative. 50 each 3     blood glucose monitoring (ACCU-CHEK FASTCLIX) lancets 1 each daily Use to test blood sugar 4 times daily or as directed. 102 each 3     blood glucose monitoring (ONE TOUCH ULTRASOFT) lancets Use to test blood sugar 4 times daily.  Ok to substitute alternative if insurance prefers. 150 each 5     blood glucose monitoring (ONETOUCH VERIO IQ) test strip Use to test blood sugar 4 times daily.  Ok to substitute alternative if insurance prefers. 150 strip 5     blood glucose monitoring (ONETOUCH VERIO) meter device kit Use to test blood sugar 4 times daily.  Ok to substitute alternative if insurance prefers. 1 kit 0     Blood Glucose Monitoring Suppl (ONETOUCH VERIO FLEX SYSTEM) w/Device KIT 1 kit 4 times daily Sample meter  LOT: [id number]  SN: [id number]       OTC products: None, except as noted above    No Known Allergies   Latex Allergy: NO    Social History   Substance Use Topics     Smoking status: Former Smoker     Smokeless tobacco: Never Used      Comment: rare cigarette use     Alcohol use No      Comment: rare     History   Drug Use No     Comment: Did smoke appx. 10 years ago       REVIEW OF SYSTEMS:   Constitutional, neuro, ENT, endocrine, pulmonary, cardiac, gastrointestinal, genitourinary, musculoskeletal, integument and psychiatric systems are negative, except as otherwise noted.    EXAM:   /64 (BP Location: Right arm, Patient Position: Chair, Cuff Size: Adult Regular)  Pulse 88  Wt 145 lb (65.8 kg)  LMP 11/25/2017  BMI 26.31 kg/m2    GENERAL APPEARANCE: healthy, alert and no distress     EYES: EOMI, PERRL     HENT: ear canals and TM's normal and nose and mouth without ulcers or lesions     NECK: no adenopathy, no asymmetry, masses, or scars and thyroid  normal to palpation     RESP: lungs clear to auscultation - no rales, rhonchi or wheezes     CV: regular rates and rhythm, normal S1 S2, no S3 or S4 and no murmur, click or rub     ABDOMEN:  IUP at term     MS: extremities normal- no gross deformities noted, no evidence of inflammation in joints, FROM in all extremities.     SKIN: no suspicious lesions or rashes     NEURO: Normal strength and tone, sensory exam grossly normal, mentation intact and speech normal     PSYCH: mentation appears normal. and affect normal/bright     LYMPHATICS: No cervical adenopathy    DIAGNOSTICS:       Recent Labs   Lab Test  07/25/18   1558  04/11/18   1630  01/16/18   1153  12/14/15   0812   10/14/15   0750   HGB  12.8  11.6*  13.6  14.8   --    --    PLT  199  245  284  275   < >   --    NA   --    --    --   139   --    --    POTASSIUM   --    --    --   3.7   --    --    CR   --    --    --   0.64   --    --    A1C   --    --   5.0   --    --   5.2    < > = values in this interval not displayed.        IMPRESSION:   IUP at 39 weeks (at time of planned C/S)      -previous LTCS, desires repeat       ICD-10-CM    1. High-risk pregnancy, young multigravida in third trimester O09.623    2. Preop general physical exam Z01.818        RECOMMENDATIONS:   Repeat LTCS      -Risks, benefits, alternatives discussed; including but not limited to risk of infection, bleeding, damage to bowel/bladder and any other intra-abdominal viscera.  Risks also include risks of anesthesia and blood transfusion. Patient understands and agrees to planned procedure.

## 2018-08-29 ENCOUNTER — PRENATAL OFFICE VISIT (OUTPATIENT)
Dept: OBGYN | Facility: CLINIC | Age: 33
End: 2018-08-29
Payer: COMMERCIAL

## 2018-08-29 VITALS
SYSTOLIC BLOOD PRESSURE: 102 MMHG | BODY MASS INDEX: 26.7 KG/M2 | HEART RATE: 84 BPM | WEIGHT: 146 LBS | DIASTOLIC BLOOD PRESSURE: 66 MMHG

## 2018-08-29 DIAGNOSIS — O09.623 HIGH-RISK PREGNANCY, YOUNG MULTIGRAVIDA IN THIRD TRIMESTER: Primary | ICD-10-CM

## 2018-08-29 PROCEDURE — 99207 ZZC COMPLICATED OB VISIT: CPT | Performed by: OBSTETRICS & GYNECOLOGY

## 2018-08-29 NOTE — MR AVS SNAPSHOT
After Visit Summary   8/29/2018    Halie Rendon    MRN: [id number]           Patient Information     Date Of Birth          1985        Visit Information        Provider Department      8/29/2018 3:30 PM Domenic Timmons MD [facility]        Today's Diagnoses     High-risk pregnancy, young multigravida in third trimester    -  1       Follow-ups after your visit        Follow-up notes from your care team     Return in about 2 days (around 8/31/2018).      Your next 10 appointments already scheduled     Aug 30, 2018  3:30 PM CDT   AVERY BARAKATP SINGLE with [id number]   [facility] - [facility] ([facility])    [address] [place] [address] [address]  [facility] [address]   [phone]            Aug 30, 2018  4:00 PM CDT   Radiology MD with  AVERY WASHINGTON   [facility]    [address] [place] [address] [address]  [facility] [address]   [phone]           Please arrive at the time given for your first appointment. This visit is used internally to schedule the physician's time during your ultrasound.            Aug 31, 2018   Procedure with Domenic Timmons MD   [facility] Labor and Delivery (--)    [address]  [facility] [id number] [phone]            Sep 12, 2018  2:00 PM CDT   SHORT with Domenic Timmons MD   [facility])    [address]  [address]  [facility] [id number] [phone]              Who to contact     If you have questions or need follow up information about today's clinic visit or your schedule please contact [facility] directly at [phone].  Normal or non-critical lab and imaging results will be communicated to you by MyChart, letter or phone within 4 business days after the clinic has received the results. If you do not hear from us within 7 days, please contact the clinic  through [employer] or phone. If you have a critical or abnormal lab result, we will notify you by phone as soon as possible.  Submit refill requests through Listiki or call your pharmacy and they will forward the refill request to us. Please allow 3 business days for your refill to be completed.          Additional Information About Your Visit        Pricefallshart Information     [employer] gives you secure access to your electronic health record. If you see a primary care provider, you can also send messages to your care team and make appointments. If you have questions, please call your primary care clinic.  If you do not have a primary care provider, please call [phone] and they will assist you.        Care EveryWhere ID     This is your Care EveryWhere ID. This could be used by other organizations to access your [facility] medical records  [id number]        Your Vitals Were     Pulse Last Period BMI (Body Mass Index)             84 11/25/2017 26.7 kg/m2          Blood Pressure from Last 3 Encounters:   08/29/18 102/66   08/21/18 102/64   08/13/18 102/64    Weight from Last 3 Encounters:   08/29/18 146 lb (66.2 kg)   08/21/18 145 lb (65.8 kg)   08/13/18 145 lb 11.2 oz (66.1 kg)              Today, you had the following     No orders found for display       Primary Care Provider Office Phone # Fax #    Domenic Timmons [phone] [address] [place] [address]        Equal Access to Services     [facility] AH: Hadii melania watermano Crow, waaxda luqadaha, qaybta kaalmada jeovany, nany crane. So [facility] [phone].    ATENCIÓN: Si habla español, tiene a loaiza disposición servicios gratuitos de asistencia lingüística. Llame al [phone].    We comply with applicable federal civil rights laws and [place] laws. We do not discriminate on the basis of race, color, national origin, age, disability, sex, sexual orientation, or gender identity.             Thank you!     Thank you for choosing [facility]  for your care. Our goal is always to provide you with excellent care. Hearing back from our patients is one way we can continue to improve our services. Please take a few minutes to complete the written survey that you may receive in the mail after your visit with us. Thank you!             Your Updated Medication List - Protect others around you: Learn how to safely use, store and throw away your medicines at www.disposemymeds.org.          This list is accurate as of 8/29/18  4:11 PM.  Always use your most recent med list.                   Brand Name Dispense Instructions for use Diagnosis    acetone (Urine) test Strp     50 each    1 strip as needed (with unexplained blood glucose over 300 mg/dL) Use one strip daily for 1 week, then weekly if negative.    Gestational diabetes       * blood glucose monitoring lancets     150 each    Use to test blood sugar 4 times daily.  Ok to substitute alternative if insurance prefers.    Gestational diabetes       * blood glucose monitoring lancets     102 each    1 each daily Use to test blood sugar 4 times daily or as directed.    Abnormal glucose tolerance test       * blood glucose monitoring meter device kit     1 kit    Use to test blood sugar 4 times daily.  Ok to substitute alternative if insurance prefers.    Gestational diabetes       * ONETOUCH VERIO FLEX SYSTEM w/Device Kit      1 kit 4 times daily Sample meter LOT: [id number] SN: [id number]    Gestational diabetes       blood glucose monitoring test strip    ONETOUCH VERIO IQ    150 strip    Use to test blood sugar 4 times daily.  Ok to substitute alternative if insurance prefers.    Gestational diabetes       prenatal multivitamin plus iron 27-0.8 MG Tabs per tablet     100 tablet    Take 1 tablet by mouth daily    High-risk pregnancy, young multigravida in first trimester       * Notice:  This list has 4 medication(s) that are the same as other  medications prescribed for you. Read the directions carefully, and ask your doctor or other care provider to review them with you.

## 2018-08-29 NOTE — NURSING NOTE
"Chief Complaint   Patient presents with     Prenatal Care     baby active and moving - US scheduled for tomorrow with MFM - pt wondering if this is necessary     39w4d    Initial /66 (BP Location: Right arm, Patient Position: Chair, Cuff Size: Adult Regular)  Pulse 84  Wt 146 lb (66.2 kg)  LMP 11/25/2017  BMI 26.7 kg/m2 Estimated body mass index is 26.7 kg/(m^2) as calculated from the following:    Height as of 8/24/18: 5' 2\" (1.575 m).    Weight as of this encounter: 146 lb (66.2 kg).  Medication Reconciliation: complete     Nurse assisted visit.  Giselle Mckinney MA.        "

## 2018-08-30 ENCOUNTER — HOSPITAL ENCOUNTER (OUTPATIENT)
Dept: LAB | Facility: CLINIC | Age: 33
Discharge: HOME | End: 2018-08-30
Attending: OBSTETRICS & GYNECOLOGY | Admitting: OBSTETRICS & GYNECOLOGY
Payer: COMMERCIAL

## 2018-08-30 DIAGNOSIS — Z01.812 PRE-OPERATIVE LABORATORY EXAMINATION: ICD-10-CM

## 2018-08-30 LAB
ABO + RH BLD: ABNORMAL
ABO + RH BLD: ABNORMAL
ABO + RH BLD: NORMAL
ABO + RH BLD: NORMAL
BLD GP AB INVEST PLASRBC-IMP: ABNORMAL
BLD GP AB INVEST PLASRBC-IMP: NORMAL
BLD GP AB SCN SERPL QL: ABNORMAL
BLD PROD TYP BPU: ABNORMAL
BLOOD BANK CMNT PATIENT-IMP: ABNORMAL
HGB BLD-MCNC: 13.6 G/DL (ref 11.7–15.7)
NUM BPU REQUESTED: 2
SPECIMEN EXP DATE BLD: ABNORMAL

## 2018-08-30 PROCEDURE — 86922 COMPATIBILITY TEST ANTIGLOB: CPT | Performed by: OBSTETRICS & GYNECOLOGY

## 2018-08-30 PROCEDURE — 86850 RBC ANTIBODY SCREEN: CPT | Performed by: OBSTETRICS & GYNECOLOGY

## 2018-08-30 PROCEDURE — 86870 RBC ANTIBODY IDENTIFICATION: CPT | Performed by: OBSTETRICS & GYNECOLOGY

## 2018-08-30 PROCEDURE — 85018 HEMOGLOBIN: CPT | Performed by: OBSTETRICS & GYNECOLOGY

## 2018-08-30 PROCEDURE — 86900 BLOOD TYPING SEROLOGIC ABO: CPT | Performed by: OBSTETRICS & GYNECOLOGY

## 2018-08-30 PROCEDURE — 36415 COLL VENOUS BLD VENIPUNCTURE: CPT | Performed by: OBSTETRICS & GYNECOLOGY

## 2018-08-30 PROCEDURE — 86901 BLOOD TYPING SEROLOGIC RH(D): CPT | Performed by: OBSTETRICS & GYNECOLOGY

## 2018-08-31 ENCOUNTER — HOSPITAL ENCOUNTER (INPATIENT)
Facility: CLINIC | Age: 33
LOS: 3 days | Discharge: HOME | End: 2018-09-03
Attending: OBSTETRICS & GYNECOLOGY | Admitting: OBSTETRICS & GYNECOLOGY
Payer: COMMERCIAL

## 2018-08-31 ENCOUNTER — SURGERY (OUTPATIENT)
Age: 33
End: 2018-08-31

## 2018-08-31 ENCOUNTER — ANESTHESIA EVENT (OUTPATIENT)
Dept: OBGYN | Facility: CLINIC | Age: 33
End: 2018-08-31
Payer: COMMERCIAL

## 2018-08-31 ENCOUNTER — ANESTHESIA (OUTPATIENT)
Dept: OBGYN | Facility: CLINIC | Age: 33
End: 2018-08-31
Payer: COMMERCIAL

## 2018-08-31 DIAGNOSIS — Z98.891 S/P C-SECTION: Primary | ICD-10-CM

## 2018-08-31 LAB
BLOOD BANK CMNT PATIENT-IMP: NORMAL
GLUCOSE BLDC GLUCOMTR-MCNC: 69 MG/DL (ref 70–99)
GLUCOSE BLDC GLUCOMTR-MCNC: 84 MG/DL (ref 70–99)

## 2018-08-31 PROCEDURE — 27210995 ZZH RX 272: Performed by: OBSTETRICS & GYNECOLOGY

## 2018-08-31 PROCEDURE — 12000029 ZZH R&B OB INTERMEDIATE

## 2018-08-31 PROCEDURE — 27210794 ZZH OR GENERAL SUPPLY STERILE: Performed by: OBSTETRICS & GYNECOLOGY

## 2018-08-31 PROCEDURE — 25000128 H RX IP 250 OP 636: Performed by: NURSE ANESTHETIST, CERTIFIED REGISTERED

## 2018-08-31 PROCEDURE — 25000132 ZZH RX MED GY IP 250 OP 250 PS 637: Performed by: OBSTETRICS & GYNECOLOGY

## 2018-08-31 PROCEDURE — 36000058 ZZH SURGERY LEVEL 3 EA 15 ADDTL MIN: Performed by: OBSTETRICS & GYNECOLOGY

## 2018-08-31 PROCEDURE — 25000125 ZZHC RX 250: Performed by: ANESTHESIOLOGY

## 2018-08-31 PROCEDURE — 37000008 ZZH ANESTHESIA TECHNICAL FEE, 1ST 30 MIN: Performed by: OBSTETRICS & GYNECOLOGY

## 2018-08-31 PROCEDURE — 37000009 ZZH ANESTHESIA TECHNICAL FEE, EACH ADDTL 15 MIN: Performed by: OBSTETRICS & GYNECOLOGY

## 2018-08-31 PROCEDURE — 59510 CESAREAN DELIVERY: CPT | Performed by: OBSTETRICS & GYNECOLOGY

## 2018-08-31 PROCEDURE — 36000056 ZZH SURGERY LEVEL 3 1ST 30 MIN: Performed by: OBSTETRICS & GYNECOLOGY

## 2018-08-31 PROCEDURE — 71000012 ZZH RECOVERY PHASE 1 LEVEL 1 FIRST HR: Performed by: OBSTETRICS & GYNECOLOGY

## 2018-08-31 PROCEDURE — 25000125 ZZHC RX 250: Performed by: NURSE ANESTHETIST, CERTIFIED REGISTERED

## 2018-08-31 PROCEDURE — 00000146 ZZHCL STATISTIC GLUCOSE BY METER IP

## 2018-08-31 PROCEDURE — 25000125 ZZHC RX 250: Performed by: OBSTETRICS & GYNECOLOGY

## 2018-08-31 PROCEDURE — 25000128 H RX IP 250 OP 636: Performed by: OBSTETRICS & GYNECOLOGY

## 2018-08-31 PROCEDURE — 25000128 H RX IP 250 OP 636: Performed by: ANESTHESIOLOGY

## 2018-08-31 RX ORDER — CITRIC ACID/SODIUM CITRATE 334-500MG
30 SOLUTION, ORAL ORAL
Status: COMPLETED | OUTPATIENT
Start: 2018-08-31 | End: 2018-08-31

## 2018-08-31 RX ORDER — OXYTOCIN/0.9 % SODIUM CHLORIDE 30/500 ML
100 PLASTIC BAG, INJECTION (ML) INTRAVENOUS CONTINUOUS
Status: DISCONTINUED | OUTPATIENT
Start: 2018-08-31 | End: 2018-09-03 | Stop reason: CLARIF

## 2018-08-31 RX ORDER — KETOROLAC TROMETHAMINE 30 MG/ML
30 INJECTION, SOLUTION INTRAMUSCULAR; INTRAVENOUS EVERY 6 HOURS PRN
Status: DISCONTINUED | OUTPATIENT
Start: 2018-08-31 | End: 2018-08-31

## 2018-08-31 RX ORDER — LIDOCAINE 40 MG/G
CREAM TOPICAL
Status: DISCONTINUED | OUTPATIENT
Start: 2018-08-31 | End: 2018-09-03 | Stop reason: HOSPADM

## 2018-08-31 RX ORDER — FENTANYL CITRATE 50 UG/ML
25-50 INJECTION, SOLUTION INTRAMUSCULAR; INTRAVENOUS EVERY 5 MIN PRN
Status: DISCONTINUED | OUTPATIENT
Start: 2018-08-31 | End: 2018-08-31 | Stop reason: CLARIF

## 2018-08-31 RX ORDER — CEFAZOLIN SODIUM 2 G/100ML
2 INJECTION, SOLUTION INTRAVENOUS
Status: COMPLETED | OUTPATIENT
Start: 2018-08-31 | End: 2018-08-31

## 2018-08-31 RX ORDER — BISACODYL 10 MG
10 SUPPOSITORY, RECTAL RECTAL DAILY PRN
Status: DISCONTINUED | OUTPATIENT
Start: 2018-09-02 | End: 2018-09-03 | Stop reason: HOSPADM

## 2018-08-31 RX ORDER — SCOLOPAMINE TRANSDERMAL SYSTEM 1 MG/1
1 PATCH, EXTENDED RELEASE TRANSDERMAL ONCE
Status: DISCONTINUED | OUTPATIENT
Start: 2018-08-31 | End: 2018-08-31

## 2018-08-31 RX ORDER — ONDANSETRON 4 MG/1
4 TABLET, ORALLY DISINTEGRATING ORAL EVERY 30 MIN PRN
Status: DISCONTINUED | OUTPATIENT
Start: 2018-08-31 | End: 2018-08-31 | Stop reason: CLARIF

## 2018-08-31 RX ORDER — SODIUM CHLORIDE, SODIUM LACTATE, POTASSIUM CHLORIDE, CALCIUM CHLORIDE 600; 310; 30; 20 MG/100ML; MG/100ML; MG/100ML; MG/100ML
INJECTION, SOLUTION INTRAVENOUS CONTINUOUS
Status: DISCONTINUED | OUTPATIENT
Start: 2018-08-31 | End: 2018-08-31

## 2018-08-31 RX ORDER — ONDANSETRON 2 MG/ML
INJECTION INTRAMUSCULAR; INTRAVENOUS PRN
Status: DISCONTINUED | OUTPATIENT
Start: 2018-08-31 | End: 2018-08-31

## 2018-08-31 RX ORDER — ONDANSETRON 2 MG/ML
4 INJECTION INTRAMUSCULAR; INTRAVENOUS EVERY 6 HOURS PRN
Status: DISCONTINUED | OUTPATIENT
Start: 2018-08-31 | End: 2018-09-03 | Stop reason: HOSPADM

## 2018-08-31 RX ORDER — NALBUPHINE HYDROCHLORIDE 10 MG/ML
2.5-5 INJECTION, SOLUTION INTRAMUSCULAR; INTRAVENOUS; SUBCUTANEOUS EVERY 6 HOURS PRN
Status: DISCONTINUED | OUTPATIENT
Start: 2018-08-31 | End: 2018-08-31

## 2018-08-31 RX ORDER — GLYCOPYRROLATE 0.2 MG/ML
INJECTION, SOLUTION INTRAMUSCULAR; INTRAVENOUS PRN
Status: DISCONTINUED | OUTPATIENT
Start: 2018-08-31 | End: 2018-08-31

## 2018-08-31 RX ORDER — OXYTOCIN/0.9 % SODIUM CHLORIDE 30/500 ML
340 PLASTIC BAG, INJECTION (ML) INTRAVENOUS CONTINUOUS PRN
Status: DISCONTINUED | OUTPATIENT
Start: 2018-08-31 | End: 2018-09-03 | Stop reason: HOSPADM

## 2018-08-31 RX ORDER — EPHEDRINE SULFATE 50 MG/ML
INJECTION, SOLUTION INTRAVENOUS PRN
Status: DISCONTINUED | OUTPATIENT
Start: 2018-08-31 | End: 2018-08-31

## 2018-08-31 RX ORDER — HYDROMORPHONE HYDROCHLORIDE 1 MG/ML
.3-.5 INJECTION, SOLUTION INTRAMUSCULAR; INTRAVENOUS; SUBCUTANEOUS EVERY 10 MIN PRN
Status: DISCONTINUED | OUTPATIENT
Start: 2018-08-31 | End: 2018-08-31 | Stop reason: CLARIF

## 2018-08-31 RX ORDER — DEXTROSE, SODIUM CHLORIDE, SODIUM LACTATE, POTASSIUM CHLORIDE, AND CALCIUM CHLORIDE 5; .6; .31; .03; .02 G/100ML; G/100ML; G/100ML; G/100ML; G/100ML
INJECTION, SOLUTION INTRAVENOUS CONTINUOUS
Status: DISCONTINUED | OUTPATIENT
Start: 2018-08-31 | End: 2018-09-03 | Stop reason: CLARIF

## 2018-08-31 RX ORDER — NALOXONE HYDROCHLORIDE 0.4 MG/ML
.1-.4 INJECTION, SOLUTION INTRAMUSCULAR; INTRAVENOUS; SUBCUTANEOUS
Status: DISCONTINUED | OUTPATIENT
Start: 2018-08-31 | End: 2018-09-03 | Stop reason: HOSPADM

## 2018-08-31 RX ORDER — KETOROLAC TROMETHAMINE 30 MG/ML
30 INJECTION, SOLUTION INTRAMUSCULAR; INTRAVENOUS EVERY 6 HOURS PRN
Status: DISCONTINUED | OUTPATIENT
Start: 2018-09-01 | End: 2018-08-31

## 2018-08-31 RX ORDER — OXYCODONE HYDROCHLORIDE 5 MG/1
5-10 TABLET ORAL
Status: DISCONTINUED | OUTPATIENT
Start: 2018-08-31 | End: 2018-09-03 | Stop reason: HOSPADM

## 2018-08-31 RX ORDER — HYDROCORTISONE 2.5 %
CREAM (GRAM) TOPICAL 3 TIMES DAILY PRN
Status: DISCONTINUED | OUTPATIENT
Start: 2018-08-31 | End: 2018-09-03 | Stop reason: HOSPADM

## 2018-08-31 RX ORDER — KETOROLAC TROMETHAMINE 30 MG/ML
30 INJECTION, SOLUTION INTRAMUSCULAR; INTRAVENOUS EVERY 6 HOURS
Status: COMPLETED | OUTPATIENT
Start: 2018-08-31 | End: 2018-09-01

## 2018-08-31 RX ORDER — NALOXONE HYDROCHLORIDE 0.4 MG/ML
.1-.4 INJECTION, SOLUTION INTRAMUSCULAR; INTRAVENOUS; SUBCUTANEOUS
Status: DISCONTINUED | OUTPATIENT
Start: 2018-08-31 | End: 2018-08-31 | Stop reason: CLARIF

## 2018-08-31 RX ORDER — ONDANSETRON 2 MG/ML
4 INJECTION INTRAMUSCULAR; INTRAVENOUS EVERY 30 MIN PRN
Status: DISCONTINUED | OUTPATIENT
Start: 2018-08-31 | End: 2018-08-31 | Stop reason: CLARIF

## 2018-08-31 RX ORDER — CEFAZOLIN SODIUM 1 G/3ML
1 INJECTION, POWDER, FOR SOLUTION INTRAMUSCULAR; INTRAVENOUS SEE ADMIN INSTRUCTIONS
Status: DISCONTINUED | OUTPATIENT
Start: 2018-08-31 | End: 2018-08-31

## 2018-08-31 RX ORDER — ALBUTEROL SULFATE 0.83 MG/ML
2.5 SOLUTION RESPIRATORY (INHALATION) EVERY 4 HOURS PRN
Status: DISCONTINUED | OUTPATIENT
Start: 2018-08-31 | End: 2018-08-31 | Stop reason: CLARIF

## 2018-08-31 RX ORDER — SODIUM CHLORIDE, SODIUM LACTATE, POTASSIUM CHLORIDE, CALCIUM CHLORIDE 600; 310; 30; 20 MG/100ML; MG/100ML; MG/100ML; MG/100ML
INJECTION, SOLUTION INTRAVENOUS CONTINUOUS
Status: DISCONTINUED | OUTPATIENT
Start: 2018-08-31 | End: 2018-08-31 | Stop reason: CLARIF

## 2018-08-31 RX ORDER — NALOXONE HYDROCHLORIDE 0.4 MG/ML
.1-.4 INJECTION, SOLUTION INTRAMUSCULAR; INTRAVENOUS; SUBCUTANEOUS
Status: DISCONTINUED | OUTPATIENT
Start: 2018-08-31 | End: 2018-08-31

## 2018-08-31 RX ORDER — HYDROMORPHONE HYDROCHLORIDE 1 MG/ML
.3-.5 INJECTION, SOLUTION INTRAMUSCULAR; INTRAVENOUS; SUBCUTANEOUS EVERY 30 MIN PRN
Status: DISCONTINUED | OUTPATIENT
Start: 2018-08-31 | End: 2018-09-03 | Stop reason: HOSPADM

## 2018-08-31 RX ORDER — FENTANYL CITRATE 50 UG/ML
25-50 INJECTION, SOLUTION INTRAMUSCULAR; INTRAVENOUS
Status: DISCONTINUED | OUTPATIENT
Start: 2018-08-31 | End: 2018-08-31 | Stop reason: CLARIF

## 2018-08-31 RX ORDER — MAGNESIUM HYDROXIDE 1200 MG/15ML
LIQUID ORAL PRN
Status: DISCONTINUED | OUTPATIENT
Start: 2018-08-31 | End: 2018-08-31

## 2018-08-31 RX ORDER — ONDANSETRON 2 MG/ML
4 INJECTION INTRAMUSCULAR; INTRAVENOUS EVERY 4 HOURS PRN
Status: DISCONTINUED | OUTPATIENT
Start: 2018-08-31 | End: 2018-08-31

## 2018-08-31 RX ORDER — HYDRALAZINE HYDROCHLORIDE 20 MG/ML
2.5-5 INJECTION INTRAMUSCULAR; INTRAVENOUS EVERY 10 MIN PRN
Status: DISCONTINUED | OUTPATIENT
Start: 2018-08-31 | End: 2018-08-31 | Stop reason: CLARIF

## 2018-08-31 RX ORDER — PRENATAL VIT/IRON FUM/FOLIC AC 27MG-0.8MG
1 TABLET ORAL DAILY
Status: DISCONTINUED | OUTPATIENT
Start: 2018-08-31 | End: 2018-09-03 | Stop reason: HOSPADM

## 2018-08-31 RX ORDER — PROCHLORPERAZINE 25 MG
25 SUPPOSITORY, RECTAL RECTAL EVERY 12 HOURS PRN
Status: DISCONTINUED | OUTPATIENT
Start: 2018-08-31 | End: 2018-09-03 | Stop reason: HOSPADM

## 2018-08-31 RX ORDER — MORPHINE SULFATE 1 MG/ML
INJECTION, SOLUTION EPIDURAL; INTRATHECAL; INTRAVENOUS PRN
Status: DISCONTINUED | OUTPATIENT
Start: 2018-08-31 | End: 2018-08-31

## 2018-08-31 RX ORDER — AMOXICILLIN 250 MG
2 CAPSULE ORAL 2 TIMES DAILY PRN
Status: DISCONTINUED | OUTPATIENT
Start: 2018-08-31 | End: 2018-09-03 | Stop reason: HOSPADM

## 2018-08-31 RX ORDER — AMOXICILLIN 250 MG
1 CAPSULE ORAL 2 TIMES DAILY PRN
Status: DISCONTINUED | OUTPATIENT
Start: 2018-08-31 | End: 2018-09-03 | Stop reason: HOSPADM

## 2018-08-31 RX ORDER — IBUPROFEN 600 MG/1
600 TABLET, FILM COATED ORAL EVERY 6 HOURS PRN
Status: DISCONTINUED | OUTPATIENT
Start: 2018-09-01 | End: 2018-09-03 | Stop reason: HOSPADM

## 2018-08-31 RX ORDER — FENTANYL CITRATE 50 UG/ML
INJECTION, SOLUTION INTRAMUSCULAR; INTRAVENOUS PRN
Status: DISCONTINUED | OUTPATIENT
Start: 2018-08-31 | End: 2018-08-31

## 2018-08-31 RX ORDER — BUPIVACAINE HYDROCHLORIDE 7.5 MG/ML
INJECTION, SOLUTION INTRASPINAL PRN
Status: DISCONTINUED | OUTPATIENT
Start: 2018-08-31 | End: 2018-08-31

## 2018-08-31 RX ORDER — MISOPROSTOL 200 UG/1
400 TABLET ORAL
Status: DISCONTINUED | OUTPATIENT
Start: 2018-08-31 | End: 2018-09-03 | Stop reason: HOSPADM

## 2018-08-31 RX ORDER — ACETAMINOPHEN 325 MG/1
975 TABLET ORAL EVERY 8 HOURS
Status: COMPLETED | OUTPATIENT
Start: 2018-08-31 | End: 2018-09-03

## 2018-08-31 RX ORDER — OXYCODONE HYDROCHLORIDE 5 MG/1
5 TABLET ORAL EVERY 4 HOURS PRN
Status: DISCONTINUED | OUTPATIENT
Start: 2018-08-31 | End: 2018-08-31

## 2018-08-31 RX ORDER — ACETAMINOPHEN 325 MG/1
650 TABLET ORAL EVERY 4 HOURS PRN
Status: DISCONTINUED | OUTPATIENT
Start: 2018-09-03 | End: 2018-09-03 | Stop reason: HOSPADM

## 2018-08-31 RX ORDER — MEPERIDINE HYDROCHLORIDE 50 MG/ML
12.5 INJECTION INTRAMUSCULAR; INTRAVENOUS; SUBCUTANEOUS
Status: DISCONTINUED | OUTPATIENT
Start: 2018-08-31 | End: 2018-08-31 | Stop reason: CLARIF

## 2018-08-31 RX ORDER — METOPROLOL TARTRATE 1 MG/ML
1-2 INJECTION, SOLUTION INTRAVENOUS EVERY 5 MIN PRN
Status: DISCONTINUED | OUTPATIENT
Start: 2018-08-31 | End: 2018-08-31 | Stop reason: CLARIF

## 2018-08-31 RX ORDER — SIMETHICONE 80 MG
80 TABLET,CHEWABLE ORAL 4 TIMES DAILY PRN
Status: DISCONTINUED | OUTPATIENT
Start: 2018-08-31 | End: 2018-09-03 | Stop reason: HOSPADM

## 2018-08-31 RX ORDER — METOCLOPRAMIDE HYDROCHLORIDE 5 MG/ML
10 INJECTION INTRAMUSCULAR; INTRAVENOUS EVERY 6 HOURS PRN
Status: DISCONTINUED | OUTPATIENT
Start: 2018-08-31 | End: 2018-09-03 | Stop reason: HOSPADM

## 2018-08-31 RX ORDER — LANOLIN 100 %
OINTMENT (GRAM) TOPICAL
Status: DISCONTINUED | OUTPATIENT
Start: 2018-08-31 | End: 2018-09-03 | Stop reason: HOSPADM

## 2018-08-31 RX ORDER — OXYTOCIN 10 [USP'U]/ML
10 INJECTION, SOLUTION INTRAMUSCULAR; INTRAVENOUS
Status: DISCONTINUED | OUTPATIENT
Start: 2018-08-31 | End: 2018-09-03 | Stop reason: HOSPADM

## 2018-08-31 RX ADMIN — SODIUM CHLORIDE, POTASSIUM CHLORIDE, SODIUM LACTATE AND CALCIUM CHLORIDE: 600; 310; 30; 20 INJECTION, SOLUTION INTRAVENOUS at 07:57

## 2018-08-31 RX ADMIN — ACETAMINOPHEN 975 MG: 325 TABLET, FILM COATED ORAL at 10:42

## 2018-08-31 RX ADMIN — KETOROLAC TROMETHAMINE 30 MG: 30 INJECTION, SOLUTION INTRAMUSCULAR at 16:50

## 2018-08-31 RX ADMIN — BUPIVACAINE HYDROCHLORIDE IN DEXTROSE 13.5 MG: 7.5 INJECTION, SOLUTION SUBARACHNOID at 07:44

## 2018-08-31 RX ADMIN — SODIUM CHLORIDE 800 ML: 900 IRRIGANT IRRIGATION at 08:32

## 2018-08-31 RX ADMIN — SODIUM CHLORIDE, POTASSIUM CHLORIDE, SODIUM LACTATE AND CALCIUM CHLORIDE: 600; 310; 30; 20 INJECTION, SOLUTION INTRAVENOUS at 07:00

## 2018-08-31 RX ADMIN — SODIUM CHLORIDE, POTASSIUM CHLORIDE, SODIUM LACTATE AND CALCIUM CHLORIDE 1000 ML: 600; 310; 30; 20 INJECTION, SOLUTION INTRAVENOUS at 06:22

## 2018-08-31 RX ADMIN — EPHEDRINE SULFATE 5 MG: 50 INJECTION, SOLUTION INTRAVENOUS at 07:50

## 2018-08-31 RX ADMIN — GLYCOPYRROLATE 0.1 MG: 0.2 INJECTION, SOLUTION INTRAMUSCULAR; INTRAVENOUS at 07:49

## 2018-08-31 RX ADMIN — SODIUM CITRATE AND CITRIC ACID MONOHYDRATE 30 ML: 500; 334 SOLUTION ORAL at 07:24

## 2018-08-31 RX ADMIN — PROCHLORPERAZINE EDISYLATE 10 MG: 5 INJECTION INTRAMUSCULAR; INTRAVENOUS at 11:33

## 2018-08-31 RX ADMIN — EPHEDRINE SULFATE 10 MG: 50 INJECTION, SOLUTION INTRAVENOUS at 07:58

## 2018-08-31 RX ADMIN — KETOROLAC TROMETHAMINE 30 MG: 30 INJECTION, SOLUTION INTRAMUSCULAR at 10:54

## 2018-08-31 RX ADMIN — SODIUM CHLORIDE 200 ML: 900 IRRIGANT IRRIGATION at 08:37

## 2018-08-31 RX ADMIN — ACETAMINOPHEN 975 MG: 325 TABLET, FILM COATED ORAL at 18:46

## 2018-08-31 RX ADMIN — SODIUM CHLORIDE, SODIUM LACTATE, POTASSIUM CHLORIDE, CALCIUM CHLORIDE AND DEXTROSE MONOHYDRATE: 5; 600; 310; 30; 20 INJECTION, SOLUTION INTRAVENOUS at 17:12

## 2018-08-31 RX ADMIN — FENTANYL CITRATE 20 MCG: 50 INJECTION, SOLUTION INTRAMUSCULAR; INTRAVENOUS at 07:44

## 2018-08-31 RX ADMIN — CEFAZOLIN SODIUM 2 G: 2 INJECTION, SOLUTION INTRAVENOUS at 07:43

## 2018-08-31 RX ADMIN — SENNOSIDES AND DOCUSATE SODIUM 1 TABLET: 8.6; 5 TABLET ORAL at 18:45

## 2018-08-31 RX ADMIN — Medication 0.2 MG: at 07:44

## 2018-08-31 RX ADMIN — EPHEDRINE SULFATE 10 MG: 50 INJECTION, SOLUTION INTRAVENOUS at 07:49

## 2018-08-31 RX ADMIN — KETOROLAC TROMETHAMINE 30 MG: 30 INJECTION, SOLUTION INTRAMUSCULAR at 22:51

## 2018-08-31 RX ADMIN — OXYTOCIN-SODIUM CHLORIDE 0.9% IV SOLN 30 UNIT/500ML 100 ML/HR: 30-0.9/5 SOLUTION at 12:22

## 2018-08-31 RX ADMIN — ONDANSETRON 4 MG: 2 INJECTION INTRAMUSCULAR; INTRAVENOUS at 07:53

## 2018-08-31 NOTE — ANESTHESIA PREPROCEDURE EVALUATION
PAC NOTE:       ANESTHESIA PRE EVALUATION:  Anesthesia Evaluation     . Pt has had prior anesthetic. Type: Regional    No history of anesthetic complications          ROS/MED HX    ENT/Pulmonary:       Neurologic:       Cardiovascular:  - neg cardiovascular ROS       METS/Exercise Tolerance:     Hematologic:         Musculoskeletal:         GI/Hepatic:         Renal/Genitourinary:     (+) Nephrolithiasis ,       Endo: Comment: Gestational diabetes        Psychiatric:         Infectious Disease:         Malignancy:         Other:                     Physical Exam      Airway   Mallampati: III  TM distance: >3 FB  Neck ROM: full    Dental     Cardiovascular   Rhythm and rate: regular and normal      Pulmonary    breath sounds clear to auscultation             Anesthesia Plan      History & Physical Review  History and physical reviewed and following examination; no interval change.    ASA Status:  2 .    NPO Status:  > 8 hours    Plan for Spinal   PONV prophylaxis:  Ondansetron (or other 5HT-3) and Dexamethasone or Solumedrol       Postoperative Care  Postoperative pain management:  IV analgesics, Oral pain medications, Neuraxial analgesia and Multi-modal analgesia.      Consents  Anesthetic plan, risks, benefits and alternatives discussed with:  Patient..                            .

## 2018-08-31 NOTE — ANESTHESIA POSTPROCEDURE EVALUATION
Patient: Halie Rendon    Procedure(s):  repeat  section - Wound Class: II-Clean Contaminated    Diagnosis:previous  Diagnosis Additional Information: Intrauterine pregnancy, desire for repeat csection    Anesthesia Type:  Spinal    Note:  Anesthesia Post Evaluation    Patient location during evaluation: OB PACU  Patient participation: Able to fully participate in evaluation  Level of consciousness: awake  Pain management: adequate  Airway patency: patent  Cardiovascular status: acceptable  Respiratory status: acceptable  Hydration status: acceptable  PONV: controlled     Anesthetic complications: None    Comments: Anticipate full return of neurologic function        Last vitals:  Vitals:    18 0915 18 0930 18 0945   BP:  108/56 96/52   Pulse:      Resp:  16 16   Temp:      SpO2: 95% 98% 100%         Electronically Signed By: Song Soto MD  2018  10:01 AM

## 2018-08-31 NOTE — IP AVS SNAPSHOT
[facility]    [address]    [facility] [address]    Phone:  [phone]    Fax:  [phone]                                       After Visit Summary   8/31/2018    Halie Rendon    MRN: [id number]           After Visit Summary Signature Page     I have received my discharge instructions, and my questions have been answered. I have discussed any challenges I see with this plan with the nurse or doctor.    ..........................................................................................................................................  Patient/Patient Representative Signature      ..........................................................................................................................................  Patient Representative Print Name and Relationship to Patient    ..................................................               ................................................  Date                                            Time    ..........................................................................................................................................  Reviewed by Signature/Title    ...................................................              ..............................................  Date                                                            Time          22EPIC Rev 08/18

## 2018-08-31 NOTE — OP NOTE
[facility]  Obstetrics Brief Operative Note    Pre-operative diagnosis: Previous C/S; desires repeat   Post-operative diagnosis: Same   Procedure: Repeat low transverse  section   Surgeon: Domenic Timmons MD   Assistant(s): Edwina Mathur   Anesthesia: Spinal anesthesia   Estimated blood loss: 600ml   Total IV fluids: (See anesthesia record)   Blood transfusion: No transfusion was given during surgery   Total urine output: (See anesthesia record)   Drains: None   Specimens: none   Findings: Live   Female   Complications: None   Condition: Infant stable, transferred to general care nursery  Mother stable, transfered to floor   Comments: See dictated operative report for full details

## 2018-08-31 NOTE — OP NOTE
Procedure Date: 2018      PREOPERATIVE DIAGNOSES:   1.  Intrauterine pregnancy at 39 weeks.   2.  Previous  section, desires repeat.   3.  Gestational diabetes, diet-controlled.      POSTOPERATIVE DIAGNOSES:   1.  Intrauterine pregnancy at 39 weeks.   2.  Previous  section, desires repeat.   3.  Gestational diabetes, diet-controlled.      PROCEDURE:  Repeat low transverse  section.      SURGEON:  Domenic Timmons MD      ASSISTANT:  Linda Mathur, ALBARO      ESTIMATED BLOOD LOSS:  600 mL.      FINDINGS:  Living female weighing 7 pounds 12 ounces, Apgars of 9 and 9.      COMPLICATIONS:  None apparent.      NARRATIVE SUMMARY:  The patient is a 33-year-old  2, para 1 patient who presents at 39-6/7 weeks for repeat  delivery.  Risks, benefits, and alternatives have been reviewed and discussed.  They include but not limited to infection, bleeding, damage to bowel, bladder or any other intraabdominal viscera, as well as all risks attended to anesthesia and blood transfusion.      DESCRIPTION OF PROCEDURE:  With consent, she was taken to the operating suite where after a Alexander catheter is placed, the abdomen was prepped and draped in sterile fashion with her having adequate spinal anesthesia.  A Pfannenstiel skin incision was made with entry into the peritoneal cavity without incident.  The bladder flap was reflected caudad and a transverse uterine incision was made by scoring the uterus transversely and then entry into the amniotic sac by blunt Shannon clamp.  The incision was finger fractured transversely.  Clear fluid is encountered.  The baby's head is delivered through the incision.  The naso-oropharynx was suctioned.  Baby was delivered without difficulty, placed on the maternal abdomen where after a 1 minute delay, the cord was clamped and cut.  The  was then handed to the nurse in attendance.  A living female weighing 7 pounds 12 ounces, Apgars of 9 and 9.  Placenta  was delivered manually and the uterine cavity wiped free of debris.  The uterus was closed with 2 layers of #1 chromic suture, the first layer being imbricated by the second.  Retrouterine space, pericolic gutters and subvesical space were irrigated copiously.  When hemostasis had been secured, the bladder flap was reapproximated.  The parietoperitoneum was then reapproximated.  Both of these layers closed with 2-0 Monocryl.  Subfascial space was irrigated copiously.  When hemostasis had been secured, the fascial edges were reapproximated with 0 Vicryl in a running fashion.  Subcutaneous space was irrigated and when hemostasis had been secured, it was closed with 2-0 plain sutures.  Skin edge reapproximated with skin clips.  Note is made that the uterus, fallopian tubes and ovaries appear grossly normal.  Mother and  went to normal recovery and  nursery respectively.         LINDA AZAR MD             D: 2018   T: 2018   MT: JHON      Name:     ZAIRA MCCULLOUGH   MRN:      [id number]        Account:        [id number]   :      1985           Procedure Date: 2018      Document: [id number]

## 2018-08-31 NOTE — PLAN OF CARE
Problem: Patient Care Overview  Goal: Plan of Care/Patient Progress Review  Outcome: No Change  VSS. Pt is on bedrest, very sleepy. Nausea upon transfer but has resolved with zofran and compazine. Fundus firm, U/U, small amounts of bleeding, having low amounts of UOP. Encouraged PO intake.  baby x1, very sleepy so hand expressed a good supply of colostrum and spoon fed baby. Will continue to monitor.

## 2018-08-31 NOTE — PLAN OF CARE
Data: Halie Rendon transferred to 442 via cart at 1118. Baby transferred via parent's arms.  Action: Receiving unit notified of transfer: Yes. Patient and family notified of room change. Report given to Marci at 1110. Belongings sent to receiving unit. Accompanied by Registered Nurse. Oriented patient to surroundings. Call light within reach. ID bands double-checked with receiving RN.  Response: Patient tolerated transfer and is stable.

## 2018-08-31 NOTE — ANESTHESIA CARE TRANSFER NOTE
Patient: Halie Rendon    Procedure(s):  repeat  section - Wound Class: II-Clean Contaminated    Diagnosis: previous  Diagnosis Additional Information: Intrauterine pregnancy, desire for repeat csection    Anesthesia Type:   Spinal     Note:  Airway :Room Air  Patient transferred to:Labor and Delivery  Handoff Report: Identifed the Patient, Identified the Reponsible Provider, Reviewed the pertinent medical history, Discussed the surgical course, Reviewed Intra-OP anesthesia mangement and issues during anesthesia, Set expectations for post-procedure period and Allowed opportunity for questions and acknowledgement of understanding      Vitals: (Last set prior to Anesthesia Care Transfer)    CRNA VITALS  2018 0827 - 2018 0900      2018             Pulse: 69    SpO2: 98 %                Electronically Signed By: Dean Dennis Severson, APRN CRNA  2018  9:00 AM

## 2018-08-31 NOTE — ANESTHESIA PROCEDURE NOTES
Peripheral nerve/Neuraxial procedure note : intrathecal  Pre-Procedure  Performed by ANA JOSÉ  Referred by NISSA  Location: OR    Procedure Times:8/31/2018 7:41 AM and 8/31/2018 7:44 AM  Pre-Anesthestic Checklist: patient identified, IV checked, risks and benefits discussed, informed consent, monitors and equipment checked, pre-op evaluation and at physician/surgeon's request    Timeout  Correct Patient: Yes   Correct Procedure: Yes   Correct Site: Yes   Correct Laterality: N/A   Correct Position: Yes   Site Marked: N/A   .   Procedure Documentation  ASA 2  Diagnosis:repeat csection.    Procedure:    Intrathecal.  Insertion Site:L3-4  (midline approach)      Patient Prep;mask, sterile gloves, povidone-iodine 7.5% surgical scrub.  .  Needle: Judith tip Spinal Needle (gauge): 25  Spinal/LP Needle Length (inches): 3.5 # of attempts: 1 and # of redirects:  Introducer used .       Assessment/Narrative  Paresthesias: No.  .  .  clear CSF fluid removed while sitting   .

## 2018-09-01 LAB
ABO + RH BLD: NORMAL
ABO + RH BLD: NORMAL
BLOOD BANK CMNT PATIENT-IMP: NORMAL
DATE RH IMM GL GVN: NORMAL
FETAL CELL SCN BLD QL ROSETTE: NORMAL
GLUCOSE BLDC GLUCOMTR-MCNC: 76 MG/DL (ref 70–99)
HGB BLD-MCNC: 11 G/DL (ref 11.7–15.7)
RH IG VIALS RECOM PATIENT: NORMAL

## 2018-09-01 PROCEDURE — 85461 HEMOGLOBIN FETAL: CPT | Performed by: OBSTETRICS & GYNECOLOGY

## 2018-09-01 PROCEDURE — 86900 BLOOD TYPING SEROLOGIC ABO: CPT | Performed by: OBSTETRICS & GYNECOLOGY

## 2018-09-01 PROCEDURE — 86901 BLOOD TYPING SEROLOGIC RH(D): CPT | Performed by: OBSTETRICS & GYNECOLOGY

## 2018-09-01 PROCEDURE — 85018 HEMOGLOBIN: CPT | Performed by: OBSTETRICS & GYNECOLOGY

## 2018-09-01 PROCEDURE — 12000027 ZZH R&B OB

## 2018-09-01 PROCEDURE — 00000146 ZZHCL STATISTIC GLUCOSE BY METER IP

## 2018-09-01 PROCEDURE — 36415 COLL VENOUS BLD VENIPUNCTURE: CPT | Performed by: OBSTETRICS & GYNECOLOGY

## 2018-09-01 PROCEDURE — 25000132 ZZH RX MED GY IP 250 OP 250 PS 637: Performed by: OBSTETRICS & GYNECOLOGY

## 2018-09-01 PROCEDURE — 25000128 H RX IP 250 OP 636: Performed by: OBSTETRICS & GYNECOLOGY

## 2018-09-01 RX ADMIN — ACETAMINOPHEN 975 MG: 325 TABLET, FILM COATED ORAL at 02:49

## 2018-09-01 RX ADMIN — IBUPROFEN 600 MG: 600 TABLET ORAL at 11:18

## 2018-09-01 RX ADMIN — ACETAMINOPHEN 975 MG: 325 TABLET, FILM COATED ORAL at 19:09

## 2018-09-01 RX ADMIN — SENNOSIDES AND DOCUSATE SODIUM 1 TABLET: 8.6; 5 TABLET ORAL at 11:19

## 2018-09-01 RX ADMIN — KETOROLAC TROMETHAMINE 30 MG: 30 INJECTION, SOLUTION INTRAMUSCULAR at 05:10

## 2018-09-01 RX ADMIN — IBUPROFEN 600 MG: 600 TABLET ORAL at 17:58

## 2018-09-01 RX ADMIN — ACETAMINOPHEN 975 MG: 325 TABLET, FILM COATED ORAL at 11:19

## 2018-09-01 RX ADMIN — IBUPROFEN 600 MG: 600 TABLET ORAL at 23:55

## 2018-09-01 RX ADMIN — HUMAN RHO(D) IMMUNE GLOBULIN 300 MCG: 300 INJECTION, SOLUTION INTRAMUSCULAR at 18:17

## 2018-09-01 RX ADMIN — PRENATAL VIT W/ FE FUMARATE-FA TAB 27-0.8 MG 1 TABLET: 27-0.8 TAB at 11:19

## 2018-09-01 NOTE — LACTATION NOTE
Lactation to see patient.  Mom reports breastfeeding is going well, concerned with latch, would like assistance with latch.  Lamar due to feed, showing some interest.  Lamar needing encouragement to feed, once latched, latch score of 7 observed.  Encouraged breast compression during feeds.  Parents worried that  is too sleepy, reassured mom this is common NW sleepiness in the 1st day but to continue to attempt q 2-3 hours at breast.

## 2018-09-01 NOTE — PLAN OF CARE
Problem: Patient Care Overview  Goal: Plan of Care/Patient Progress Review  Outcome: Improving  Patient is stable, asking appropriate questions, but same questions to multiple people, receiving known same answers. She is actively working on breastfeeding, gaining strength and independence. FOB present and attentive at the bedside.

## 2018-09-01 NOTE — PROGRESS NOTES
[facility] Obstetrics Post-Op / Progress Note         Assessment and Plan:    Assessment:   Post-operative day #1  Low transverse repeat  section  L&D complications: Scheduled, repeat  section      Doing well.  Clean wound without signs of infection.  No immediate surgical complications identified.  No excessive bleeding  Pain well-controlled.      Plan:   Ambulation encouraged  Monitor wound for signs of infection  Pain control measures as needed  Reportable signs and symptoms dicussed with the patient  Anticipate discharge in 1-2 days           Interval History:   Doing well.  Pain is well-controlled.  No fevers.  No history of wound drainage, warmth or significant erythema.  Good appetite.  Denies chest pain, shortness of breath, nausea or vomiting.  Ambulatory.  Breastfeeding well.          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          Medications:   All medications related to the patient's surgery have been reviewed          Physical Exam:     All vitals stable  Patient Vitals for the past 12 hrs:   BP Temp Temp src Pulse Resp SpO2   18 0749 - 98.8  F (37.1  C) - - 18 98 %   18 0353 101/61 98  F (36.7  C) Oral 80 18 94 %   18 2359 106/63 97.9  F (36.6  C) Oral - 18 95 %     Wound clean and dry with minimal or no drainage.  Surrounding skin with minimal erythema.  Ext NT           Data:     Hemoglobin   Date Value Ref Range Status   09/01/2018 11.0 (L) 11.7 - 15.7 g/dL Final   08/30/2018 13.6 11.7 - 15.7 g/dL Final   07/25/2018 12.8 11.7 - 15.7 g/dL Final   04/11/2018 11.6 (L) 11.7 - 15.7 g/dL Final   01/16/2018 13.6 11.7 - 15.7 g/dL Final     -    Dalton Varghese MD  9/1/2018 8:18 AM

## 2018-09-01 NOTE — PLAN OF CARE
Problem: Patient Care Overview  Goal: Plan of Care/Patient Progress Review  Outcome: Improving  Dangled at bedside. Stood up and ambulated to bathroom for angus care, cath care. Assisted back to bed. pneumoboots re-applied.  Tolerating regular diet.  Good intake and output.

## 2018-09-01 NOTE — PLAN OF CARE
Problem: Patient Care Overview  Goal: Plan of Care/Patient Progress Review  Outcome: Improving  Pt stable and meeting expected goals. VSS. Alexander out and pt ambulated to the bathroom; tolerated well. Breastfeeding is improving; spouse is very helpful with positioning and latch. Pumping initiated to help with milk production. Bonding well with infant. Continue to monitor.

## 2018-09-02 PROCEDURE — 25000132 ZZH RX MED GY IP 250 OP 250 PS 637: Performed by: OBSTETRICS & GYNECOLOGY

## 2018-09-02 PROCEDURE — 12000027 ZZH R&B OB

## 2018-09-02 PROCEDURE — 40000085 ZZH STATISTIC IP LACTATION SERVICES 31-45 MIN

## 2018-09-02 RX ORDER — ZOLPIDEM TARTRATE 5 MG/1
5 TABLET ORAL ONCE
Status: COMPLETED | OUTPATIENT
Start: 2018-09-02 | End: 2018-09-03

## 2018-09-02 RX ADMIN — IBUPROFEN 600 MG: 600 TABLET ORAL at 13:25

## 2018-09-02 RX ADMIN — PRENATAL VIT W/ FE FUMARATE-FA TAB 27-0.8 MG 1 TABLET: 27-0.8 TAB at 11:06

## 2018-09-02 RX ADMIN — IBUPROFEN 600 MG: 600 TABLET ORAL at 06:48

## 2018-09-02 RX ADMIN — SENNOSIDES AND DOCUSATE SODIUM 1 TABLET: 8.6; 5 TABLET ORAL at 11:06

## 2018-09-02 RX ADMIN — SENNOSIDES AND DOCUSATE SODIUM 1 TABLET: 8.6; 5 TABLET ORAL at 18:51

## 2018-09-02 RX ADMIN — ACETAMINOPHEN 975 MG: 325 TABLET, FILM COATED ORAL at 03:18

## 2018-09-02 RX ADMIN — ACETAMINOPHEN 975 MG: 325 TABLET, FILM COATED ORAL at 18:50

## 2018-09-02 RX ADMIN — IBUPROFEN 600 MG: 600 TABLET ORAL at 18:51

## 2018-09-02 RX ADMIN — ACETAMINOPHEN 975 MG: 325 TABLET, FILM COATED ORAL at 11:06

## 2018-09-02 NOTE — PLAN OF CARE
Problem: Patient Care Overview  Goal: Plan of Care/Patient Progress Review  Outcome: Improving  Pt stable and meeting expected goals. VSS. Up ad meron and independent with cares. Breastfeeding is going well; worked on hand expression this shift. Nipples sore from feeding; using hydrogel. Pain managed with tylenol and ibuprofen. Pt and spouse bonding well with infant. Anticipated discharge today. Continue to monitor.

## 2018-09-02 NOTE — PLAN OF CARE
Problem: Postpartum ( Delivery) (Adult,Obstetrics,Pediatric)  Goal: Signs and Symptoms of Listed Potential Problems Will be Absent, Minimized or Managed (Postpartum)  Signs and symptoms of listed potential problems will be absent, minimized or managed by discharge/transition of care (reference Postpartum ( Delivery) (Adult,Obstetrics,Pediatric) CPG).   Outcome: Improving  Patient is stable, actively working on breastfeeding, gaining strength, independence and observed walking in halls X 2 thus far today.

## 2018-09-02 NOTE — PLAN OF CARE
Problem: Patient Care Overview  Goal: Plan of Care/Patient Progress Review  Outcome: Improving  Pt is up and ambulating in the room . Has staples on her incision. Taking Ibuprofen and tylenol or mild discomfort. Has sore nipple , using hydrogel .  Rhogam given IM . Asking questions and tearful this shift with breastfeeding

## 2018-09-02 NOTE — PROGRESS NOTES
[facility] Obstetrics Post-Op / Progress Note         Assessment and Plan:    Assessment:   Post-operative day #2  Low transverse repeat  section  L&D complications: Previous C/S      Doing well.  Hg 11.0      Plan:   Anticipate discharge tomorrow           Interval History:   Doing well.  Pain is well-controlled.  No fevers.  No history of wound drainage, warmth or significant erythema.  Good appetite.  Denies chest pain, shortness of breath, nausea or vomiting.  Ambulatory.  Breastfeeding well.          Significant Problems:    None          Review of Systems:    The patient denies any chest pain, shortness of breath, excessive pain, fever, chills, purulent drainage from the wound, nausea or vomiting.          Medications:   All medications related to the patient's surgery have been reviewed          Physical Exam:   All vitals stable  Wound clean and dry with minimal or no drainage.  Surrounding skin with minimal erythema.          Data:   All laboratory data related to this surgery reviewed  All imaging studies related to this surgery reviewed    Domenic Timmons MD

## 2018-09-02 NOTE — PLAN OF CARE
Problem: Patient Care Overview  Goal: Plan of Care/Patient Progress Review  Outcome: Improving    Instructed to fill out birth certificate and depression score. Some Discharge education reviewed, pt asking questions . Has steri strips on her incision. + sore nipple cream, pt has hydrogel.

## 2018-09-03 VITALS
WEIGHT: 146 LBS | SYSTOLIC BLOOD PRESSURE: 116 MMHG | OXYGEN SATURATION: 99 % | BODY MASS INDEX: 26.87 KG/M2 | TEMPERATURE: 97.8 F | RESPIRATION RATE: 18 BRPM | DIASTOLIC BLOOD PRESSURE: 74 MMHG | HEIGHT: 62 IN | HEART RATE: 83 BPM

## 2018-09-03 LAB
BLD PROD TYP BPU: NORMAL
BLD PROD TYP BPU: NORMAL
BLD UNIT ID BPU: 0
BLD UNIT ID BPU: 0
BLOOD PRODUCT CODE: NORMAL
BLOOD PRODUCT CODE: NORMAL
BPU ID: NORMAL
BPU ID: NORMAL
TRANSFUSION STATUS PATIENT QL: NORMAL

## 2018-09-03 PROCEDURE — 25000132 ZZH RX MED GY IP 250 OP 250 PS 637: Performed by: OBSTETRICS & GYNECOLOGY

## 2018-09-03 PROCEDURE — 40000084 ZZH STATISTIC IP LACTATION SERVICES 16-30 MIN

## 2018-09-03 PROCEDURE — 90707 MMR VACCINE SC: CPT | Performed by: OBSTETRICS & GYNECOLOGY

## 2018-09-03 PROCEDURE — 25000128 H RX IP 250 OP 636: Performed by: OBSTETRICS & GYNECOLOGY

## 2018-09-03 RX ORDER — OXYCODONE HYDROCHLORIDE 5 MG/1
5-10 TABLET ORAL
Qty: 14 TABLET | Refills: 0 | Status: SHIPPED | OUTPATIENT
Start: 2018-09-03 | End: 2018-09-14

## 2018-09-03 RX ADMIN — SENNOSIDES AND DOCUSATE SODIUM 1 TABLET: 8.6; 5 TABLET ORAL at 09:29

## 2018-09-03 RX ADMIN — ZOLPIDEM TARTRATE 5 MG: 5 TABLET, FILM COATED ORAL at 00:31

## 2018-09-03 RX ADMIN — ACETAMINOPHEN 975 MG: 325 TABLET, FILM COATED ORAL at 04:00

## 2018-09-03 RX ADMIN — PRENATAL VIT W/ FE FUMARATE-FA TAB 27-0.8 MG 1 TABLET: 27-0.8 TAB at 09:29

## 2018-09-03 RX ADMIN — MEASLES, MUMPS, AND RUBELLA VIRUS VACCINE LIVE 0.5 ML: 1000; 12500; 1000 INJECTION, POWDER, LYOPHILIZED, FOR SUSPENSION SUBCUTANEOUS at 11:38

## 2018-09-03 RX ADMIN — ACETAMINOPHEN 650 MG: 325 TABLET, FILM COATED ORAL at 12:04

## 2018-09-03 RX ADMIN — IBUPROFEN 600 MG: 600 TABLET ORAL at 06:45

## 2018-09-03 NOTE — CONSULTS
SW met with pt per MD order. Pts  also present at time of visit. Pt reports doing well emotionally currently. Pt reports that she was very tearful and anxious yesterday but attributed this in part to not having slept in three days. She stated that she received sleep medication last night and was able to sleep for five hours straight. She stated that she felt much better. Pt reports that she has a four year old daughter at home and that she experienced some postpartum depression when she weaned that daughter around 10 months of age. She stated that she did see a counselor at that time but that the counselor lives some distance from her. Pt currently denies any suicidality, homicidality and also denies any history of this. Discussed postpartum depression including when to seek help. Discussed with pt importance of seeking help for sleep issues if they persist when she returns home as well. Pt stated understanding. Pts  also stated understanding. Pts  reports he will be off work for a month and so will be able to offer good support during that time and reports that they have both sets of grandparents also living in the area. Pt reports that she is interested in finding a new therapist before she feels she is having major issues. SW provided pt with information on Postpartum Support [place] including helpline and website information to assist with symptoms or finding a provider. Pt stated understanding. SW also provided resources on postpartum depression and general parent resources. Pt and  stated understanding of the resources provided and denied any additional needs at this time. SW available as needed.  WILNER Julian, [facility] HospitalSW  9:27 AM

## 2018-09-03 NOTE — PLAN OF CARE
Problem: Patient Care Overview  Goal: Plan of Care/Patient Progress Review  Outcome: Improving  Pt able to get some uninterrupted during the night, after taking her ordered Ambien.  Pt visibly less anxious after rest.  States ordered pain medications making her more comfortable. Voiding/ambulating w/o difficulty.  Incision approximated w/ CDI steri-strips.  Pumping after feedings and getting 1-3 ml.  Gel pads and colostrum to tender nipples.  FOB present and supportive.  Anticipate discharge later today.

## 2018-09-03 NOTE — DISCHARGE SUMMARY
[facility]    Discharge Summary  Obstetrics    Date of Admission:  2018  Date of Discharge:  9/3/2018  Discharging Provider: Melinda Germain    Discharge Diagnoses   Repeat      Procedure/Surgery Information   Procedure: Procedure(s):  repeat  section - Wound Class: II-Clean Contaminated   Surgeon(s): Surgeon(s) and Role:     * Domenic Timmons MD - Primary     History of Present Illness   Halie Rendon is a 33 year old female who presented for planned , repeat.    Hospital Course   The patient's hospital course was unremarkable.  She recovered as anticipated and experienced no post-operative complications.  On discharge, her pain was well controlled. Vaginal bleeding is similar to peak menstrual flow.  Voiding without difficulty.  Ambulating well and tolerating a normal diet.  No fever or significant wound drainage.  Breastfeeding well.  Infant is stable.  She was discharged on post-partum day #3.    Post-partum hemoglobin:   Hemoglobin   Date Value Ref Range Status   2018 11.0 (L) 11.7 - 15.7 g/dL Final       Melinda Germain    Discharge Disposition   Discharged to home   Condition at discharge: Stable    Pending Results     Unresulted Labs Ordered in the Past 30 Days of this Admission     No orders found from 2018 to 2018.          Primary Care Physician   Domenic Timmons    Consultations This Hospital Stay   SOCIAL WORK IP CONSULT  SOCIAL WORK IP CONSULT  HOME CARE POST PARTUM/ IP CONSULT  LACTATION IP CONSULT    Discharge Orders     Activity   Review discharge instructions     Reason for your hospital stay   Maternity care     Discharge Instructions - Postpartum visit   Schedule postpartum visit with your provider and return to clinic in 2 and 6 weeks.     Discharge Instructions - Gestational diabetic patients   Gestational diabetic patients to follow up for fasting blood sugar and 2 hour 75gm glucose load at 6 weeks postpartum.      Diet   Resume previous diet       Discharge Medications   Current Discharge Medication List      START taking these medications    Details   oxyCODONE IR (ROXICODONE) 5 MG tablet Take 1-2 tablets (5-10 mg) by mouth every 3 hours as needed for other (pain control or improvement in physical function. Hold dose for analgesic side effects.)  Qty: 14 tablet, Refills: 0    Associated Diagnoses: S/P          CONTINUE these medications which have NOT CHANGED    Details   Prenatal Vit-Fe Fumarate-FA (PRENATAL MULTIVITAMIN PLUS IRON) 27-0.8 MG TABS per tablet Take 1 tablet by mouth daily  Qty: 100 tablet, Refills: 3    Associated Diagnoses: High-risk pregnancy, young multigravida in first trimester      acetone, Urine, test STRP 1 strip as needed (with unexplained blood glucose over 300 mg/dL) Use one strip daily for 1 week, then weekly if negative.  Qty: 50 each, Refills: 3    Comments: Please dispense foil-wrapped keto-stix  Associated Diagnoses: Gestational diabetes      !! blood glucose monitoring (ACCU-CHEK FASTCLIX) lancets 1 each daily Use to test blood sugar 4 times daily or as directed.  Qty: 102 each, Refills: 3    Comments: Patient will call for fill when needed.  Associated Diagnoses: Abnormal glucose tolerance test      !! blood glucose monitoring (ONE TOUCH ULTRASOFT) lancets Use to test blood sugar 4 times daily.  Ok to substitute alternative if insurance prefers.  Qty: 150 each, Refills: 5    Associated Diagnoses: Gestational diabetes      blood glucose monitoring (ONETOUCH VERIO IQ) test strip Use to test blood sugar 4 times daily.  Ok to substitute alternative if insurance prefers.  Qty: 150 strip, Refills: 5    Associated Diagnoses: Gestational diabetes      !! blood glucose monitoring (ONETOUCH VERIO) meter device kit Use to test blood sugar 4 times daily.  Ok to substitute alternative if insurance prefers.  Qty: 1 kit, Refills: 0    Comments: Do not dispense.  Patient given sample meter.   Patient to call if refill needed.  Associated Diagnoses: Gestational diabetes      !! Blood Glucose Monitoring Suppl (ONETOUCH VERIO FLEX SYSTEM) w/Device KIT 1 kit 4 times daily Sample meter  LOT: [id number]  SN: [id number]    Associated Diagnoses: Gestational diabetes       !! - Potential duplicate medications found. Please discuss with provider.        Allergies   Allergies   Allergen Reactions     Blood Transfusion Related (Informational Only)      Patient has a history of a clinically significant antibody against RBC antigens.  A delay in compatible RBCs may occur.

## 2018-09-03 NOTE — PLAN OF CARE
Problem: Patient Care Overview  Goal: Plan of Care/Patient Progress Review  Outcome: Adequate for Discharge Date Met: 09/03/18  Patient vitally stable, voiding without issue, tolerating regular diet, ambulating independently. Postpartum checks WDL. Incision with steri strips CDI. Patient states pain is minimal, prn Tylenol given with stated management of pain. Breastfeeding, utilizing shield and pumping post feeds/finger feeding EBM to infant. Significant other present and supportive.     Discharge education reviewed with patient. Breast pump given to patient. Discharge medications given to and reviewed with patient. Patient states no additional questions at this time. Patient discharged on 9/3/18.

## 2018-09-03 NOTE — PROVIDER NOTIFICATION
"   09/02/18 4770   Provider Notification   Provider Name/Title / Norberto   Method of Notification Phone   Notification Reason Medication Request;Status Update     Pt was walking on hallway and noted to be in tears. Went to pt's room and pt verbalized \" I can not sleep \".  pt states that shes very anxious about baby's weight loss . Discussed to pt that its normal for baby to drop weight . MD ok to have ambien tonight for sleep. Social work Consult ordered.   "

## 2018-09-03 NOTE — LACTATION NOTE
Lactation in to see patient. Baby latched at time of visit. Patient states some soreness, baby latch is tight. Encouraged mouth exercised. Using shield for discomfort. Multiple swallows at breast. Mother had been pumping prn. Baby at 8%weight loss. Encouraged frequent feeds and breast compressions. All questions answered at this time. Writer in multiple times for support and reassurance. Mother love and gel pads given.

## 2018-09-03 NOTE — LACTATION NOTE
LC to see per bedside RN request. Halie reports sore nipples, babe doesn't open jaw wide enough. Oral assessment noted short lingual frenulum with limited tongue elevation and tongue posterior when sucking on gloved finger. Mother using 20 mm nipple shield, changed to 24 mm. Mother latched babe in cross cradle positioning. Reports no discomfort and continues breast feeding babe with noted long draws and audible swallowing. Instructed father on giving gtts of EBM with syringe if babe slow fussy awaiting letdown. Instructed mother on breast compressions to aid in milk transfer. Family requesting follow up information for pediatrician groups near [place]. Gave list of Peds clinics in that area. Breast feeding resources within [place] and I gave them my card for OP lactation follow up. We discussed the frenulum restriction I saw. I encouraged reading information regarding frenulum issues. I suggested contacting peds Dr for following and assessment. I also suggested contacting ENT and/or Pediatric Dentist for further assessment and follow up.  Bedside RN Arline updated.

## 2018-09-03 NOTE — DISCHARGE INSTRUCTIONS
Please schedule a follow up appointment with your OBGYN for 6 weeks.       Postpartum pain control:  It is normal to have abdominal pain following surgery. The goal is to get your pain level to a 3/10, enabling you to walk around comfortably. You may experience cramping for upto 1-2 weeks, particularly while breast feeding.     -Start your day by taking up to 3 tabs of regular strength ibuprofen (600mg), continue every 6 hours as needed for pain.    -You can additionally take 1-2 tabs of tylenol (325-650mg regular strength or 500-1000mg extra strength), every 6 hours for additional pain control as needed. Take no more than 4g of tylenol daily.     - Narcotic pain medication is available for breakthrough pain, you may find that you need it every 4-6 hours or just at night. You can take 1-2 tabs of oxycodone, roxicodone, or norco as needed. If you are taking a formulation that contains tylenol (oxycodone, norco) do NOT take additional tylenol.     It is best to alternate your medications so you are taking something every 3 hours if you are having continued pain.    For example:  6am: ibuprofen 3 tabs (600 mg)   9am: tylenol 1000 mg  12pm: ibuprofen 600 mg  3pm: tylenol 1000 mg  6pm: ibuprofen 600 mg  9pm: tyelnol 1000 mg  12am: ibuprofen 600 mg    Remember, you can use the oxycodone or other narcotic pain medication in between these times if needed for significant breakthrough pain that makes it hard to walk or function. This may be necessary for 3-7 days, especially.    If you have vaginal pain you can take sitz baths 1-2x/day. Fill the tub with 2-3 inches of warm water and soak the perineal and vaginal area for 10 minutes. Ice or warm packs can also be applied for comfort.      Warning signs:  You can expect some continued spotting or bleeding for the next 4 weeks or so, if you develop significantly higher flow more than 1 pad/hour please call the clinic right away. If you develop fever to 100.4 or greater,  increasing pelvic/abdominal pain, or foul smelling discharge please contact the clinic. Increased volume of discharge is normal. If you develop a severe headache, especially with visual changes, please call the clinic.    Limitations:  No lifting > 10lb for 6 weeks  No driving for 2 weeks or while using narcotic pain medications  Nothing per vagina (no intercourse or tampons) for 6 weeks     Constipation  You may use the following products to ease constipation:    1. Stool softeners such as metamucil or benefiber  2. senna 1-2 times daily  3. Fiber supplements  4. miralax (over the counter).  5. Dulcolax    Please be sure to keep adequately hydrated; 6-8 8oz glasses daily, more if needed to compensate for exercise, sweating, etc.      More specific dosing can be found below:    - Metamucil 28g daily PO with 8oz of water  - senna-docusate 8.6-50 MG per tablet PO 1 tablet, Oral, 2 TIMES DAILY, Start with 1 tablet PO BID, reduce to 1 tablet daily when having daily BMs. Stop for loose stools.  - docusate sodium (COLACE) capsule 100 mg, Oral, 2 TIMES DAILY, To prevent constipation. Hold for loose stools.  - bisacodyl (DULCOLAX) suppository 10 mg, Rectal, DAILY PRN, constipation, Hold for loose stools.       Please contact the clinic with any questions.  Please schedule a follow up appointment with your primary OB/Gyn provider in 6 weeks and sooner if you have any problems.   You can contact the clinic via [employer] or call [facility] at [phone].      Postop  Birth Instructions    Activity       Do not lift more than 10 pounds for 6 weeks after surgery.  Ask family and friends for help when you need it.    No driving until you have stopped taking your pain medications (usually two weeks after surgery).    No heavy exercise or activity for 6 weeks.  Don't do anything that will put a strain on your surgery site.    Don't strain when using the toilet.  Your care team may prescribe a stool softener if you have  problems with your bowel movements.     To care for your incision:       Keep the incision clean and dry.    Do not soak your incision in water. No swimming or hot tubs until it has fully healed. You may soak in the bathtub if the water level is below your incision.    Do not use peroxide, gel, cream, lotion, or ointment on your incision.    Adjust your clothes to avoid pressure on your surgery site (check the elastic in your underwear for example).     You may see a small amount of clear or pink drainage and this is normal.  Check with your health care provider:       If the drainage increases or has an odor.    If the incision reddens, you have swelling, or develop a rash.    If you have increased pain and the medicine we prescribed doesn't help.    If you have a fever above 100.4 F (38 C) with or without chills when placing thermometer under your tongue.   The area around your incision (surgery wound), will feel numb.  This is normal. The numbness should go away in less than a year.     Keep your hands clean:  Always wash your hands before touching your incision (surgery wound). This helps reduce your risk of infection. If your hands aren't dirty, you may use an alcohol hand-rub to clean your hands. Keep your nails clean and short.    Call your healthcare provider if you have any of these symptoms:       You soak a sanitary pad with blood within 1 hour, or you see blood clots larger than a golf ball.    Bleeding that lasts more than 6 weeks.    Vaginal discharge that smells bad.    Severe pain, cramping or tenderness in your lower belly area.    A need to urinate more frequently (use the toilet more often), more urgently (use the toilet very quickly), or it burns when you urinate.    Nausea and vomiting.    Redness, swelling or pain around a vein in your leg.    Problems breastfeeding or a red or painful area on your breast.    Chest pain and cough or are gasping for air.    Problems with coping with sadness,  anxiety or depression. If you have concerns about hurting yourself or the baby, call your provider immediately.      You have questions or concerns after you return home.

## 2018-09-07 ENCOUNTER — OFFICE VISIT (OUTPATIENT)
Dept: PEDIATRICS | Facility: CLINIC | Age: 33
End: 2018-09-07
Attending: OBSTETRICS & GYNECOLOGY
Payer: COMMERCIAL

## 2018-09-07 DIAGNOSIS — Z71.89 ENCOUNTER FOR BREAST FEEDING COUNSELING: Primary | ICD-10-CM

## 2018-09-07 NOTE — MR AVS SNAPSHOT
After Visit Summary   9/7/2018    Halie Rendon    MRN: [id number]           Patient Information     Date Of Birth          1985        Visit Information        Provider Department      9/7/2018 2:30 PM Dawn Marley, RN [facility]        Today's Diagnoses     Encounter for breast feeding counseling    -  1      Care Instructions    Recommendations:  1. Feed [place] at least every 3 hours from the start of one feeding to the next   2. Breast feed using 24 mm nipple shield, feed on both breasts, switch sides when she is no longer swallowing, move back to first breast when no longer swallowing        on opposite breast  3. Use breast compressions throughout feeding  4. Offer supplement via syringe of your pumped milk ( at least 15-30 mL)  5. Continue to pump after every feeding, breast or bottle ( 8 times each day)  6. Use paced bottle feeding to help preserve breast feeding. Videos available on You tube.      Return to see me at 2:30 pm on Wednesday, September 12      Lactation Consultant: ADDIS Navarro, RN, IBCLC    Start time: 2:40 pm  End time: 4:30pm     >60 minute Lactation Consultation with > 50% of time spent on breastfeeding counseling and coordination of care.    [facility]  Phone (Appts): [phone]           Follow-ups after your visit        Your next 10 appointments already scheduled     Sep 14, 2018  3:15 PM CDT   SHORT with Domenic Timmons MD   [facility]    [address] [place] [id number] [phone]              Who to contact     If you have questions or need follow up information about today's clinic visit or your schedule please contact [facility] directly at [phone].  Normal or non-critical lab and imaging results will be communicated to you by MyChart, letter or phone  within 4 business days after the clinic has received the results. If you do not hear from us within 7 days, please contact the clinic through [email or web address] or phone. If you have a critical or abnormal lab result, we will notify you by phone as soon as possible.  Submit refill requests through [email or web address] or call your pharmacy and they will forward the refill request to us. Please allow 3 business days for your refill to be completed.          Additional Information About Your Visit        [employer] Experience Information     [email or web address] gives you secure access to your electronic health record. If you see a primary care provider, you can also send messages to your care team and make appointments. If you have questions, please call your primary care clinic.  If you do not have a primary care provider, please call [phone] and they will assist you.        Care EveryWhere ID     This is your Care EveryWhere ID. This could be used by other organizations to access your [place] medical records  [id number]        Your Vitals Were     Last Period                   11/25/2017            Blood Pressure from Last 3 Encounters:   09/03/18 116/74   08/29/18 102/66   08/21/18 102/64    Weight from Last 3 Encounters:   08/31/18 66.2 kg (146 lb)   08/29/18 66.2 kg (146 lb)   08/21/18 65.8 kg (145 lb)              Today, you had the following     No orders found for display       Primary Care Provider Office Phone # Fax #    Domenic Timmons [phone] [address] [place] [address]        Equal Access to Services     [facility]: Hadii aad ku hadasho Soomaali, waaxda luqadaha, qaybta kaalmada jesusyasadaf, nany murcia . So [facility] [phone].    ATENCIÓN: Si habla español, tiene a loaiza disposición servicios gratuitos de asistencia lingüística. Llame al [phone].    We comply with applicable federal civil rights laws and [place] laws. We do not discriminate on the basis of race,  color, national origin, age, disability, sex, sexual orientation, or gender identity.            Thank you!     Thank you for choosing [facility]  for your care. Our goal is always to provide you with excellent care. Hearing back from our patients is one way we can continue to improve our services. Please take a few minutes to complete the written survey that you may receive in the mail after your visit with us. Thank you!             Your Updated Medication List - Protect others around you: Learn how to safely use, store and throw away your medicines at www.disposemymeds.org.          This list is accurate as of 9/7/18 11:59 PM.  Always use your most recent med list.                   Brand Name Dispense Instructions for use Diagnosis    acetone (Urine) test Strp     50 each    1 strip as needed (with unexplained blood glucose over 300 mg/dL) Use one strip daily for 1 week, then weekly if negative.    Gestational diabetes       * blood glucose monitoring lancets     150 each    Use to test blood sugar 4 times daily.  Ok to substitute alternative if insurance prefers.    Gestational diabetes       * blood glucose monitoring lancets     102 each    1 each daily Use to test blood sugar 4 times daily or as directed.    Abnormal glucose tolerance test       * blood glucose monitoring meter device kit     1 kit    Use to test blood sugar 4 times daily.  Ok to substitute alternative if insurance prefers.    Gestational diabetes       * ONETOUCH VERIO FLEX SYSTEM w/Device Kit      1 kit 4 times daily Sample meter LOT: [id number] SN: [id number]    Gestational diabetes       blood glucose monitoring test strip    ONETOUCH VERIO IQ    150 strip    Use to test blood sugar 4 times daily.  Ok to substitute alternative if insurance prefers.    Gestational diabetes       oxyCODONE IR 5 MG tablet    ROXICODONE    14 tablet    Take 1-2 tablets (5-10 mg) by mouth every 3 hours as needed for other (pain  control or improvement in physical function. Hold dose for analgesic side effects.)    S/P        prenatal multivitamin plus iron 27-0.8 MG Tabs per tablet     100 tablet    Take 1 tablet by mouth daily    High-risk pregnancy, young multigravida in first trimester       * Notice:  This list has 4 medication(s) that are the same as other medications prescribed for you. Read the directions carefully, and ask your doctor or other care provider to review them with you.

## 2018-09-11 ENCOUNTER — TELEPHONE (OUTPATIENT)
Dept: OBGYN | Facility: CLINIC | Age: 33
End: 2018-09-11

## 2018-09-12 ENCOUNTER — OFFICE VISIT (OUTPATIENT)
Dept: PEDIATRICS | Facility: CLINIC | Age: 33
End: 2018-09-12
Attending: OBSTETRICS & GYNECOLOGY
Payer: COMMERCIAL

## 2018-09-12 DIAGNOSIS — Z71.89 ENCOUNTER FOR BREAST FEEDING COUNSELING: Primary | ICD-10-CM

## 2018-09-12 PROCEDURE — G0463 HOSPITAL OUTPT CLINIC VISIT: HCPCS | Mod: ZF

## 2018-09-12 NOTE — PATIENT INSTRUCTIONS
Recommendations:  1. Feed Carly at least every 3 hours from the start of one feeding to the next   2. Breast feed using 24 mm nipple shield, feed on both breasts, switch sides when she is no longer swallowing, move back to first breast when no longer swallowing        on opposite breast  3. Use breast compressions throughout feeding  4. Offer supplement via syringe of your pumped milk ( at least 15-30 mL)  5. Continue to pump after every feeding, breast or bottle ( 8 times each day)  6. Use paced bottle feeding to help preserve breast feeding. Videos available on You tube.      Return to see me at 2:30 pm on Wednesday, September 12      Lactation Consultant: ADDIS Navarro, RN, IBCLC    Start time: 2:40 pm  End time: 4:30pm     >60 minute Lactation Consultation with > 50% of time spent on breastfeeding counseling and coordination of care.    [facility]  Phone (Appts): [phone]

## 2018-09-12 NOTE — MR AVS SNAPSHOT
After Visit Summary   9/12/2018    Halie Rendon    MRN: [id number]           Patient Information     Date Of Birth          1985        Visit Information        Provider Department      9/12/2018 2:30 PM Dawn Marley, RN [facility] [place] [facility]        Today's Diagnoses     Encounter for breast feeding counseling    -  1      Care Instructions    Recommendations:   1. Seek input from pediatric dentist for [place]'s frenulum       Names I have available are [facility]                                                       [facility]                                                       [facility]  2. Continue breast feeding using nipple shield  3.  Massage breasts prior to feeding        Use warm packs to aid in let down  4. Continue supplementing after breast feeding       Lactation Consultant: ADDIS Navarro, RN, IBCLC    Start time: 2:40pm End time: 4pm     >60 minute Lactation Consultation with > 50% of time spent on breastfeeding counseling and coordination of care.    [facility]  Phone (Appts): [phone]           Follow-ups after your visit        Your next 10 appointments already scheduled     Sep 14, 2018  3:15 PM CDT   SHORT with Domenic Timmons MD   [facility]    [address]  [address]  [facility] [address] [phone]              Who to contact     If you have questions or need follow up information about today's clinic visit or your schedule please contact [facility] directly at [phone].  Normal or non-critical lab and imaging results will be communicated to you by MyChart, letter or phone within 4 business days after the clinic has received the results. If you do not hear from us within 7 days, please contact the clinic through MyChart or phone. If you have a critical or abnormal lab result, we will  notify you by phone as soon as possible.  Submit refill requests through "[employer] or call your pharmacy and they will forward the refill request to us. Please allow 3 business days for your refill to be completed.          Additional Information About Your Visit        [employer] Information     "[employer] gives you secure access to your electronic health record. If you see a primary care provider, you can also send messages to your care team and make appointments. If you have questions, please call your primary care clinic.  If you do not have a primary care provider, please call [phone] and they will assist you.        Care EveryWhere ID     This is your Care EveryWhere ID. This could be used by other organizations to access your [place] medical records  [id number]        Your Vitals Were     Last Period                   11/25/2017            Blood Pressure from Last 3 Encounters:   09/03/18 116/74   08/29/18 102/66   08/21/18 102/64    Weight from Last 3 Encounters:   08/31/18 66.2 kg (146 lb)   08/29/18 66.2 kg (146 lb)   08/21/18 65.8 kg (145 lb)              Today, you had the following     No orders found for display       Primary Care Provider Office Phone # Fax #    Domenic Timmons [phone]       [facility] [place] [address]        Equal Access to Services     [facility]: Hadii melania ku hadasho Soomaali, waaxda luqadaha, qaybta kaalmada adeegyada, nany hamm hayjessica murcia . So [facility] [phone].    ATENCIÓN: Si habla español, tiene a loaiza disposición servicios gratuitos de asistencia lingüística. Llame al [phone].    We comply with applicable federal civil rights laws and [place] laws. We do not discriminate on the basis of race, color, national origin, age, disability, sex, sexual orientation, or gender identity.            Thank you!     Thank you for choosing [facility]  for your care. Our goal is always to  provide you with excellent care. Hearing back from our patients is one way we can continue to improve our services. Please take a few minutes to complete the written survey that you may receive in the mail after your visit with us. Thank you!             Your Updated Medication List - Protect others around you: Learn how to safely use, store and throw away your medicines at www.disposemymeds.org.          This list is accurate as of 18  6:41 PM.  Always use your most recent med list.                   Brand Name Dispense Instructions for use Diagnosis    acetone (Urine) test Strp     50 each    1 strip as needed (with unexplained blood glucose over 300 mg/dL) Use one strip daily for 1 week, then weekly if negative.    Gestational diabetes       * blood glucose monitoring lancets     150 each    Use to test blood sugar 4 times daily.  Ok to substitute alternative if insurance prefers.    Gestational diabetes       * blood glucose monitoring lancets     102 each    1 each daily Use to test blood sugar 4 times daily or as directed.    Abnormal glucose tolerance test       * blood glucose monitoring meter device kit     1 kit    Use to test blood sugar 4 times daily.  Ok to substitute alternative if insurance prefers.    Gestational diabetes       * ONETOUCH VERIO FLEX SYSTEM w/Device Kit      1 kit 4 times daily Sample meter LOT: [id number] SN: [id number]    Gestational diabetes       blood glucose monitoring test strip    ONETOUCH VERIO IQ    150 strip    Use to test blood sugar 4 times daily.  Ok to substitute alternative if insurance prefers.    Gestational diabetes       oxyCODONE IR 5 MG tablet    ROXICODONE    14 tablet    Take 1-2 tablets (5-10 mg) by mouth every 3 hours as needed for other (pain control or improvement in physical function. Hold dose for analgesic side effects.)    S/P        prenatal multivitamin plus iron 27-0.8 MG Tabs per tablet     100 tablet    Take 1 tablet by mouth daily     High-risk pregnancy, young multigravida in first trimester       * Notice:  This list has 4 medication(s) that are the same as other medications prescribed for you. Read the directions carefully, and ask your doctor or other care provider to review them with you.

## 2018-09-12 NOTE — PROGRESS NOTES
Lactation Follow Up Visit    Date of Visit: 2018    Baby's current age: 12 days    Reason for Visit: follow up breast feeding volumes    Date of last weight:September 10  done at Peds office    Last weight:  7 lbs 6.3 oz  (3345  Gm)    Current Weight: 3370 gm 7 lbs 7 oz     Feeding Issues/Changes since last visit: supplementing after every feeding with syringe, breast feeding 7x/day    Supplementin mL per syringe when not breast feeding                                  When breast feeding supplementing with 1 to 1 1/2 ounce expressed milk,  as well as formula feeding of 2 ounces at night    Pumpinx/day at least    Meds/Herbals: prenatal vitamins    Output :  WNL     Feeding Assessment/Weights:  10 mL LEFT breast after 15 minutes   26 mL RIGHT breast after 15 minutes    8 ml   LEFT  Breast after 10 minutes       Total  44 mL or almost 1 1/2 oz after 40 minutes            16 mL given via syringe of expressed milk  Total  60mL for feeding (2 ounces)     For [place] weight today she should be getting about 67 mL each feeding ( about 2 1/2 ounces)      Summary:   [place] suck is stronger. Her endurance at breast is improved. Milk transfer has not improved. Short frenulum is visible when crying. I do not see her tongue elevate to the roof of her mouth.    Recommendations:   1. Seek input from pediatric dentist for [place] frenulum       Names I have available are [facility]                                                       [facility]  2. Continue breast feeding using nipple shield  3.  Massage breasts prior to feeding        Use warm packs to aid in let down  4. Continue supplementing after breast feeding       Lactation Consultant: ADDIS Navarro, RN, IBCLC    Start time: 2:40pm End time: 4pm     >60 minute Lactation Consultation with > 50% of time spent on breastfeeding counseling and coordination of care.    Specialty  [facility]  Phone (Appts): [phone]

## 2018-09-12 NOTE — PATIENT INSTRUCTIONS
Recommendations:   1. Seek input from pediatric dentist for Carly's frenulum       Names I have available are [facility]                                                       [facility]  2. Continue breast feeding using nipple shield  3.  Massage breasts prior to feeding        Use warm packs to aid in let down  4. Continue supplementing after breast feeding       Lactation Consultant: ADDIS Navarro, RN, IBCLC    Start time: 2:40pm End time: 4pm     >60 minute Lactation Consultation with > 50% of time spent on breastfeeding counseling and coordination of care.    [facility]  Phone (Appts): [phone]

## 2018-09-12 NOTE — PROGRESS NOTES
Lactation Visit    Date of Visit: September 7, 2018    Baby's current age: 7 days    Reason for Visit: sore nipples without nipple shield     Date of last weight: 9/4/18 done at Peds office  Last weight:  3189 gm ( 7 lbs . 05 oz)   Current Weight: 3180 gm (without clothes or diaper) 7 lbs      Feeding Issues/Changes: unable to latch without nipple shield, feeding every 2 hours, supplementing     Supplementing: with mother's pumped milk after most breast feedings  Pumping: 10x per day    Meds/Herbals: prenatal vitamins    Output :  WNL     Feeding Assessment/Weights  23 mL LEFT breast after 10 minutes   27  mL RIGHT breast after  20 minutes       Total 50 mL or just over 1 1/2 oz after  30 minutes  For her weight Carly should get 64 ml each feeding (every 3 hours, 8 times/day) or just over 2 ounces (1 ounce = 30mL)      Mother Assessment:  Nipples are healed with no open wounds noted    Baby Summary: Carly is slow to start with feeding, but able to initiate letdown.  Sleepy after letdown.  Short sucking bursts noted. Click noted with sucking x1.  Encouragement needed to continue to feed with breast compressions.   Able to latch without nipple shield but unable to sustain. With nipple shield no discomfort noted.   Lingual frenulum appears short with limited tongue elevation    Recommendations:  1. Feed [place] at least every 3 hours from the start of one feeding to the next   2. Breast feed using 24 mm nipple shield, feed on both breasts, switch sides when she is no longer swallowing, move back to first breast when no longer swallowing        on opposite breast  3. Use breast compressions throughout feeding  4. Offer supplement via syringe of your pumped milk ( at least 15-30 mL)  5. Continue to pump after every feeding, breast or bottle ( 8 times each day)  6. Use paced bottle feeding to help preserve breast feeding. Videos available on You tube.      Return to see me at 2:30 pm on Wednesday, September  12      Lactation Consultant: ADDIS Navarro, RN, IBCLC    Start time: 2:40 pm  End time: 4:30pm     >60 minute Lactation Consultation with > 50% of time spent on breastfeeding counseling and coordination of care.    [facility]  Phone (Appts): [phone]

## 2018-09-14 ENCOUNTER — OFFICE VISIT (OUTPATIENT)
Dept: OBGYN | Facility: CLINIC | Age: 33
End: 2018-09-14
Payer: COMMERCIAL

## 2018-09-14 VITALS
BODY MASS INDEX: 23.23 KG/M2 | HEART RATE: 80 BPM | WEIGHT: 127 LBS | SYSTOLIC BLOOD PRESSURE: 100 MMHG | DIASTOLIC BLOOD PRESSURE: 62 MMHG

## 2018-09-14 DIAGNOSIS — Z98.890 POSTOPERATIVE STATE: Primary | ICD-10-CM

## 2018-09-14 DIAGNOSIS — R30.0 DYSURIA: ICD-10-CM

## 2018-09-14 DIAGNOSIS — K59.4 RECTAL SPASM: ICD-10-CM

## 2018-09-14 PROBLEM — O09.899 RUBELLA NON-IMMUNE STATUS, ANTEPARTUM: Status: RESOLVED | Noted: 2018-01-17 | Resolved: 2018-09-14

## 2018-09-14 PROBLEM — Z28.39 RUBELLA NON-IMMUNE STATUS, ANTEPARTUM: Status: RESOLVED | Noted: 2018-01-17 | Resolved: 2018-09-14

## 2018-09-14 PROBLEM — Z87.768 STATUS POST CLUB FOOT CORRECTION AT BIRTH: Status: RESOLVED | Noted: 2018-02-14 | Resolved: 2018-09-14

## 2018-09-14 PROBLEM — O09.629 HIGH-RISK PREGNANCY, YOUNG MULTIGRAVIDA: Status: RESOLVED | Noted: 2018-01-16 | Resolved: 2018-09-14

## 2018-09-14 PROBLEM — O24.410 DIET CONTROLLED GESTATIONAL DIABETES MELLITUS (GDM): Status: RESOLVED | Noted: 2018-05-09 | Resolved: 2018-09-14

## 2018-09-14 PROBLEM — O99.891 HISTORY OF POSTPARTUM DEPRESSION, CURRENTLY PREGNANT: Status: RESOLVED | Noted: 2018-02-14 | Resolved: 2018-09-14

## 2018-09-14 PROBLEM — Z86.59 HISTORY OF POSTPARTUM DEPRESSION, CURRENTLY PREGNANT: Status: RESOLVED | Noted: 2018-02-14 | Resolved: 2018-09-14

## 2018-09-14 PROBLEM — Z98.891 S/P C-SECTION: Status: RESOLVED | Noted: 2018-08-31 | Resolved: 2018-09-14

## 2018-09-14 LAB
ALBUMIN UR-MCNC: NEGATIVE MG/DL
APPEARANCE UR: CLEAR
BACTERIA #/AREA URNS HPF: ABNORMAL /HPF
BILIRUB UR QL STRIP: NEGATIVE
COLOR UR AUTO: YELLOW
GLUCOSE UR STRIP-MCNC: NEGATIVE MG/DL
HGB UR QL STRIP: ABNORMAL
KETONES UR STRIP-MCNC: NEGATIVE MG/DL
LEUKOCYTE ESTERASE UR QL STRIP: NEGATIVE
NITRATE UR QL: NEGATIVE
PH UR STRIP: 6.5 PH (ref 5–7)
RBC #/AREA URNS AUTO: ABNORMAL /HPF
SOURCE: ABNORMAL
SP GR UR STRIP: 1.01 (ref 1–1.03)
UROBILINOGEN UR STRIP-ACNC: 0.2 EU/DL (ref 0.2–1)
WBC #/AREA URNS AUTO: ABNORMAL /HPF

## 2018-09-14 PROCEDURE — 99024 POSTOP FOLLOW-UP VISIT: CPT | Performed by: OBSTETRICS & GYNECOLOGY

## 2018-09-14 PROCEDURE — 81001 URINALYSIS AUTO W/SCOPE: CPT | Performed by: OBSTETRICS & GYNECOLOGY

## 2018-09-14 RX ORDER — NITROFURANTOIN 25; 75 MG/1; MG/1
100 CAPSULE ORAL 2 TIMES DAILY
Qty: 14 CAPSULE | Refills: 0 | Status: SHIPPED | OUTPATIENT
Start: 2018-09-14 | End: 2019-02-24

## 2018-09-14 RX ORDER — NITROFURANTOIN 25; 75 MG/1; MG/1
100 CAPSULE ORAL 2 TIMES DAILY
Qty: 14 CAPSULE | Refills: 0 | Status: SHIPPED | OUTPATIENT
Start: 2018-09-14 | End: 2018-09-14

## 2018-09-14 NOTE — NURSING NOTE
"Chief Complaint   Patient presents with     Surgical Followup      follow-up 18 - pain with BM's - urethra pain       Initial /62 (BP Location: Right arm, Patient Position: Chair, Cuff Size: Adult Regular)  Pulse 80  Wt 127 lb (57.6 kg)  LMP 2017  Breastfeeding? Yes  BMI 23.23 kg/m2 Estimated body mass index is 23.23 kg/(m^2) as calculated from the following:    Height as of 18: 5' 2\" (1.575 m).    Weight as of this encounter: 127 lb (57.6 kg).  Medication Reconciliation: complete     Nurse assisted visit.  Giselle Mckinney MA.        "

## 2018-09-14 NOTE — PROGRESS NOTES
Here for postop check      -notes dysuria though improving      -more troublesome are rectal spasms with passage of gas or BM      -present since C/S; but has been happening on monthly basis on first day of menses before               -this pregnancy    O: incision; intact    A: satisfactory postop check      -symptoms as above    P: recommend U/A and trial of B&O supp

## 2018-09-14 NOTE — MR AVS SNAPSHOT
After Visit Summary   9/14/2018    Halie Rendon    MRN: [id number]           Patient Information     Date Of Birth          1985        Visit Information        Provider Department      9/14/2018 3:15 PM Domenic Timmons MD [facility]        Today's Diagnoses     Postoperative state    -  1    Dysuria           Follow-ups after your visit        Your next 10 appointments already scheduled     Oct 08, 2018  6:30 PM CDT   Post Partum with Domenic Timmons MD   [facility] ([facility])    [address]  [address]  [facility] [id number] [phone]              Who to contact     If you have questions or need follow up information about today's clinic visit or your schedule please contact [facility] directly at [phone].  Normal or non-critical lab and imaging results will be communicated to you by MyChart, letter or phone within 4 business days after the clinic has received the results. If you do not hear from us within 7 days, please contact the clinic through MyChart or phone. If you have a critical or abnormal lab result, we will notify you by phone as soon as possible.  Submit refill requests through [employer] or call your pharmacy and they will forward the refill request to us. Please allow 3 business days for your refill to be completed.          Additional Information About Your Visit        MyChart Information     [employer] gives you secure access to your electronic health record. If you see a primary care provider, you can also send messages to your care team and make appointments. If you have questions, please call your primary care clinic.  If you do not have a primary care provider, please call [phone] and they will assist you.        Care EveryWhere ID     This is your Care EveryWhere ID. This could be used by other organizations to access your [facility] medical records  [id number]         Your Vitals Were     Pulse Last Period Breastfeeding? BMI (Body Mass Index)          80 11/25/2017 Yes 23.23 kg/m2         Blood Pressure from Last 3 Encounters:   09/14/18 100/62   09/03/18 116/74   08/29/18 102/66    Weight from Last 3 Encounters:   09/14/18 127 lb (57.6 kg)   08/31/18 146 lb (66.2 kg)   08/29/18 146 lb (66.2 kg)              We Performed the Following     *UA reflex to Microscopic and Culture ([place] and [facility] (except [place] and [place])     Urine Microscopic          Today's Medication Changes          These changes are accurate as of 9/14/18  4:15 PM.  If you have any questions, ask your nurse or doctor.               Start taking these medicines.        Dose/Directions    opium-belladonna 30-16.2 MG per suppository   Commonly known as:  B&O SUPPRETTES   Used for:  Postoperative state   Started by:  Domenic Timmons MD        Dose:  15 mg   Place 0.5 suppositories rectally every 8 hours as needed for moderate pain or bladder spasms   Quantity:  12 suppository   Refills:  0            Where to get your medicines      Some of these will need a paper prescription and others can be bought over the counter.  Ask your nurse if you have questions.     Bring a paper prescription for each of these medications     opium-belladonna 30-16.2 MG per suppository                Primary Care Provider Office Phone # Fax #    Domenic Timmons [phone] [address] [place] [address]        Equal Access to Services     [facility] AH: Hadii aad ku hadasho Soomaali, waaxda luqadaha, qaybta kaalmada adeegyada, waxay noris murcia . So [place] [phone].    ATENCIÓN: Si habla español, tiene a loaiza disposición servicios gratuitos de asistencia lingüística. Llame al [phone].    We comply with applicable federal civil rights laws and [place] laws. We do not discriminate on the basis of race, color, national origin, age, disability,  sex, sexual orientation, or gender identity.            Thank you!     Thank you for choosing [facility]  for your care. Our goal is always to provide you with excellent care. Hearing back from our patients is one way we can continue to improve our services. Please take a few minutes to complete the written survey that you may receive in the mail after your visit with us. Thank you!             Your Updated Medication List - Protect others around you: Learn how to safely use, store and throw away your medicines at www.disposemymeds.org.          This list is accurate as of 9/14/18  4:15 PM.  Always use your most recent med list.                   Brand Name Dispense Instructions for use Diagnosis    acetone (Urine) test Strp     50 each    1 strip as needed (with unexplained blood glucose over 300 mg/dL) Use one strip daily for 1 week, then weekly if negative.    Gestational diabetes       * blood glucose monitoring lancets     150 each    Use to test blood sugar 4 times daily.  Ok to substitute alternative if insurance prefers.    Gestational diabetes       * blood glucose monitoring lancets     102 each    1 each daily Use to test blood sugar 4 times daily or as directed.    Abnormal glucose tolerance test       * blood glucose monitoring meter device kit     1 kit    Use to test blood sugar 4 times daily.  Ok to substitute alternative if insurance prefers.    Gestational diabetes       * ONETOUCH VERIO FLEX SYSTEM w/Device Kit      1 kit 4 times daily Sample meter LOT: [id number] SN: [id number]    Gestational diabetes       blood glucose monitoring test strip    ONETOUCH VERIO IQ    150 strip    Use to test blood sugar 4 times daily.  Ok to substitute alternative if insurance prefers.    Gestational diabetes       opium-belladonna 30-16.2 MG per suppository    B&O SUPPRETTES    12 suppository    Place 0.5 suppositories rectally every 8 hours as needed for moderate pain or bladder spasms     Postoperative state       prenatal multivitamin plus iron 27-0.8 MG Tabs per tablet     100 tablet    Take 1 tablet by mouth daily    High-risk pregnancy, young multigravida in first trimester       * Notice:  This list has 4 medication(s) that are the same as other medications prescribed for you. Read the directions carefully, and ask your doctor or other care provider to review them with you.

## 2018-09-25 ENCOUNTER — LACTATION ENCOUNTER (OUTPATIENT)
Age: 33
End: 2018-09-25

## 2018-09-25 ENCOUNTER — OFFICE VISIT (OUTPATIENT)
Dept: PEDIATRICS | Facility: CLINIC | Age: 33
End: 2018-09-25
Attending: OBSTETRICS & GYNECOLOGY
Payer: COMMERCIAL

## 2018-09-25 PROCEDURE — G0463 HOSPITAL OUTPT CLINIC VISIT: HCPCS | Mod: ZF

## 2018-09-25 NOTE — LACTATION NOTE
"This note was copied from a baby's chart.  LACTATION CONSULT/PHYSICIAN REPORT     MOTHER     Doctor: Dr. Timmons                MOTHER'S CONCERNS  Painful latch and concerns about milk supply     Medical History  None, gestational diabetes     Pregnancy History        Breastfeeding History  Low milk and Pump and bottle only and significant pain with latch     Delivery History  Repeat      Labor Meds/Anesthesia  Spinal     Current Medications  Prenatal Vitamins: Yes  No meds, was on antibiotics that are now complete     Herbals:  Fenugreek product and cod liver oil     ASSESSMENT OF MOTHER     Physical:   WNL feeling better, incision healing     Milk Supply: WNL for age or lower milk production.       PUMPING: [employer]        HOME CARE VISIT: none           BABY                                                          Name: Carly                   Birth Date: [birth year]           Age: 25d                 Gestational Age: 40     Doctor: Dr. Rausch      Complications of Birth: None     Breastfeeding/hospital: Nipple Shield        ASSESSMENT OF BABY     Physical:   WNL      CURRENT BREASTFEEDING ISSUES:   Had weight loss in the hospital so has not been exclusively breastfeed since that time.  Pumping and bottling to offer her breastmilk every 2-3 hours at home.  Halie would like to bring her back to breast and reduce pumping and bottling if able.  Would also like to wean off the nipple shield.     SUPPLEMENTATION: taking 2 oz of EBM every 2 hours     OUTPUT:   WNL            Age: 25d                                          Date: 18                                  Weight: 8lb 7.3oz  Growth Chart Detail 2018   Height 1' 9\"       1' 9\"   Weight 7 lb 11.5 oz 7 lb 12 oz 7 lb 3 oz 7 lb 1 oz 7 lb 14.5 oz   Head Cir 13.976       14.25   BMI (Calculated) 12.33       12.63   Height percentile 98.8       81.9   Weight percentile 71.6 72.7 51.6 43.4 37.7         "                                                                  Since last weight, baby has a gain of 8.8 oz.in 7 days.  Note: Normal weight gain is 1/2 to 1 oz/day in the first 6 months of life.     FEEDING ASSESSMENT  BEFORE INTERVENTION: Pain Levels: L: 6  R: 6  AFTER INTERVENTION: Pain Levels: L: 3  R: 3     INTERVENTIONS/EVALUATION:  Cross Cradle, Asymmetric Latch, Flange lips and Breast Compression     WEIGHT GAIN AT BREAST: (NOTE: 30cc = 1 oz):  At current weight, baby needs approximately 22.5 oz in 24 hours or 2.8 oz every 3 hours   ( 84 mL)     Number order of breastfeeding  16 mL 1st LEFT breast after 10 minutes                  20 mL 2nd RIGHT breast after 15 minutes        TOTAL: 36 cc or 1.2 oz after 25 minutes     SUMMARY  Low milk supply.        RECOMMENDATIONS  Breast compression while baby nurses to get more milk to baby and move the feeding along faster. Squeeze and hold while baby sucks, let up when baby pauses and repeat, moving thumb to another spot., Supplement with dropper/bottle with (none for next few days to increase her interest with feeds). We will reassess  oz of breast milk or formula if milk is not fully in., Herbals GoLacta and fenugreek product already at home.  Could consider goat's rue. and Foods that increase supply.     Plan to nurse on demand without the shield and as often as baby is able to nurse.  Will assess infant's interest in feeds again and milk transfer on Friday.  Will also perform follow up weight.   requested that patient track feeds and output until follow up appointment on Friday.     Follow up: Other: Follow up on Friday to reassess milk transfer and milk supply.     Next visit/Phone call: Lactation Consultant: Monday 9/28/18 at 2:30.        Visit Start time: 12:00                                                                              End time: 13:11     Lactation Consultant: Gabriela Padilla, RN                                            Date:  9/25/2018     .     [facility]  Phone: [phone]     Fax: [phone]                Revision History       Date/Time User Provider Type Action     9/25/2018  1:42 PM Gabriela Padilla, RN Registered Nurse Addend     9/25/2018  1:12 PM Gabriela Padilla, RN Registered Nurse Sign     View Details Report

## 2018-09-25 NOTE — CONSULTS
"LACTATION CONSULT/PHYSICIAN REPORT    MOTHER    Doctor: Dr. Timmons     MOTHER'S CONCERNS  Painful latch and concerns about milk supply    Medical History  None, gestational diabetes    Pregnancy History       Breastfeeding History  Low milk and Pump and bottle only and significant pain with latch    Delivery History  Repeat     Labor Meds/Anesthesia  Spinal    Current Medications  Prenatal Vitamins: Yes  No meds, was on antibiotics that are now complete    Herbals:  Fenugreek product and cod liver oil    ASSESSMENT OF MOTHER    Physical:   WNL feeling better, incision healing    Milk Supply: WNL for age or lower milk production.      PUMPING: [employer]      HOME CARE VISIT: none        BABY       Name: Carly Birth Date: [birth year] Age: 25d Gestational Age: 40    Doctor: Dr. Rausch     Complications of Birth: None    Breastfeeding/hospital: Nipple Shield      ASSESSMENT OF BABY    Physical:   WNL     CURRENT BREASTFEEDING ISSUES:   Had weight loss in the hospital so has not been exclusively breastfeed since that time.  Pumping and bottling to offer her breastmilk every 2-3 hours at home.  Halie would like to bring her back to breast and reduce pumping and bottling if able.  Would also like to wean off the nipple shield.    SUPPLEMENTATION: taking 2 oz of EBM every 2 hours    OUTPUT:   WNL         Age: 25d  Date: 18  Weight: 8lb 7.3oz  Growth Chart Detail 2018   Height 1' 9\"    1' 9\"   Weight 7 lb 11.5 oz 7 lb 12 oz 7 lb 3 oz 7 lb 1 oz 7 lb 14.5 oz   Head Cir 13.976    14.25   BMI (Calculated) 12.33    12.63   Height percentile 98.8    81.9   Weight percentile 71.6 72.7 51.6 43.4 37.7          Since last weight, baby has a gain of 8.8 oz.in 7 days.  Note: Normal weight gain is 1/2 to 1 oz/day in the first 6 months of life.    FEEDING ASSESSMENT  BEFORE INTERVENTION: Pain Levels: L: 6  R: 6  AFTER INTERVENTION: Pain Levels: L: 3  R: " 3    INTERVENTIONS/EVALUATION:  Cross Cradle, Asymmetric Latch, Flange lips and Breast Compression    WEIGHT GAIN AT BREAST: (NOTE: 30cc = 1 oz):  At current weight, baby needs approximately 22.5 oz in 24 hours or 2.8 oz every 3 hours   ( 84 mL)    Number order of breastfeeding  16 mL 1st LEFT breast after 10 minutes 20 mL 2nd RIGHT breast after 15 minutes      TOTAL: 36 cc or 1.2 oz after 25 minutes    SUMMARY  Low milk supply.      RECOMMENDATIONS  Breast compression while baby nurses to get more milk to baby and move the feeding along faster. Squeeze and hold while baby sucks, let up when baby pauses and repeat, moving thumb to another spot., Supplement with dropper/bottle with (none for next few days to increase her interest with feeds). We will reassess  oz of breast milk or formula if milk is not fully in., Herbals GoLacta and fenugreek product already at home.  Could consider goat's rue. and Foods that increase supply.    Plan to nurse on demand without the shield and as often as baby is able to nurse.  Will assess infant's interest in feeds again and milk transfer on Friday.  Will also perform follow up weight.   requested that patient track feeds and output until follow up appointment on Friday.    Follow up: Other: Follow up on Friday to reassess milk transfer and milk supply.    Next visit/Phone call: Lactation Consultant: Monday 9/28/18 at 2:30.      Visit Start time: 12:00    End time: 13:11    Lactation Consultant: Gabriela Padilla RN  Date: 9/25/2018    .    [facility] Breastfeeding Connection  Phone: [phone]     Fax: [phone]

## 2018-10-08 ENCOUNTER — PRENATAL OFFICE VISIT (OUTPATIENT)
Dept: OBGYN | Facility: CLINIC | Age: 33
End: 2018-10-08
Payer: COMMERCIAL

## 2018-10-08 ENCOUNTER — AMBULATORY - HEALTHEAST (OUTPATIENT)
Dept: MULTI SPECIALTY CLINIC | Facility: CLINIC | Age: 33
End: 2018-10-08

## 2018-10-08 VITALS
SYSTOLIC BLOOD PRESSURE: 110 MMHG | DIASTOLIC BLOOD PRESSURE: 64 MMHG | BODY MASS INDEX: 24.14 KG/M2 | HEART RATE: 68 BPM | WEIGHT: 132 LBS

## 2018-10-08 DIAGNOSIS — Z01.419 ENCOUNTER FOR GYNECOLOGICAL EXAMINATION WITHOUT ABNORMAL FINDING: ICD-10-CM

## 2018-10-08 DIAGNOSIS — Z97.5 IUD (INTRAUTERINE DEVICE) IN PLACE: ICD-10-CM

## 2018-10-08 LAB
HPV_EXT - HISTORICAL: NORMAL
PAP SMEAR - HIM PATIENT REPORTED: NORMAL

## 2018-10-08 PROCEDURE — 87624 HPV HI-RISK TYP POOLED RSLT: CPT | Performed by: OBSTETRICS & GYNECOLOGY

## 2018-10-08 PROCEDURE — 58300 INSERT INTRAUTERINE DEVICE: CPT | Performed by: OBSTETRICS & GYNECOLOGY

## 2018-10-08 PROCEDURE — 99207 ZZC POST PARTUM EXAM: CPT | Performed by: OBSTETRICS & GYNECOLOGY

## 2018-10-08 PROCEDURE — G0123 SCREEN CERV/VAG THIN LAYER: HCPCS | Performed by: OBSTETRICS & GYNECOLOGY

## 2018-10-08 NOTE — MR AVS SNAPSHOT
After Visit Summary   10/8/2018    Halie Rendon    MRN: [id number]           Patient Information     Date Of Birth          1985        Visit Information        Provider Department      10/8/2018 6:30 PM Domenic Timmons MD [facility]        Today's Diagnoses     Routine postpartum follow-up    -  1    IUD (intrauterine device) in place        Encounter for gynecological examination without abnormal finding           Follow-ups after your visit        Who to contact     If you have questions or need follow up information about today's clinic visit or your schedule please contact [facility] directly at [phone].  Normal or non-critical lab and imaging results will be communicated to you by MyChart, letter or phone within 4 business days after the clinic has received the results. If you do not hear from us within 7 days, please contact the clinic through [employer] or phone. If you have a critical or abnormal lab result, we will notify you by phone as soon as possible.  Submit refill requests through [employer] or call your pharmacy and they will forward the refill request to us. Please allow 3 business days for your refill to be completed.          Additional Information About Your Visit        MyChart Information     [employer] gives you secure access to your electronic health record. If you see a primary care provider, you can also send messages to your care team and make appointments. If you have questions, please call your primary care clinic.  If you do not have a primary care provider, please call [phone] and they will assist you.        Care EveryWhere ID     This is your Care EveryWhere ID. This could be used by other organizations to access your [facility] medical records  [id number]        Your Vitals Were     Pulse Last Period Breastfeeding? BMI (Body Mass Index)          68 11/25/2017 Yes 24.14 kg/m2         Blood Pressure from Last 3  Encounters:   10/08/18 110/64   09/14/18 100/62   09/03/18 116/74    Weight from Last 3 Encounters:   10/08/18 132 lb (59.9 kg)   09/14/18 127 lb (57.6 kg)   08/31/18 146 lb (66.2 kg)              We Performed the Following     HPV High Risk Types DNA Cervical     INSERTION INTRAUTERINE DEVICE     Pap imaged thin layer screen with HPV - recommended age 30 - 65 years (select HPV order below)        Primary Care Provider Office Phone # Fax #    Domenic Timmons [phone] [address] [place] [address]        Equal Access to Services     [facility]: Hadii melania Maria, waaxda ozzie, qaybta kaalmada jeovany, nany murcia . So [facility] [phone].    ATENCIÓN: Si habla español, tiene a loaiza disposición servicios gratuitos de asistencia lingüística. [facility] [phone].    We comply with applicable federal civil rights laws and [place] laws. We do not discriminate on the basis of race, color, national origin, age, disability, sex, sexual orientation, or gender identity.            Thank you!     Thank you for choosing [facility]  for your care. Our goal is always to provide you with excellent care. Hearing back from our patients is one way we can continue to improve our services. Please take a few minutes to complete the written survey that you may receive in the mail after your visit with us. Thank you!             Your Updated Medication List - Protect others around you: Learn how to safely use, store and throw away your medicines at www.disposemymeds.org.          This list is accurate as of 10/8/18  7:15 PM.  Always use your most recent med list.                   Brand Name Dispense Instructions for use Diagnosis    acetone (urine) test strip    KETOSTIX    50 each    1 strip as needed (with unexplained blood glucose over 300 mg/dL) Use one strip daily for 1 week, then weekly if negative.    Gestational diabetes        * blood glucose monitoring lancets     150 each    Use to test blood sugar 4 times daily.  Ok to substitute alternative if insurance prefers.    Gestational diabetes       * blood glucose monitoring lancets     102 each    1 each daily Use to test blood sugar 4 times daily or as directed.    Abnormal glucose tolerance test       * blood glucose monitoring meter device kit     1 kit    Use to test blood sugar 4 times daily.  Ok to substitute alternative if insurance prefers.    Gestational diabetes       * ONETOUCH VERIO FLEX SYSTEM w/Device Kit      1 kit 4 times daily Sample meter LOT: [id number] SN: [id number]    Gestational diabetes       blood glucose monitoring test strip    ONETOUCH VERIO IQ    150 strip    Use to test blood sugar 4 times daily.  Ok to substitute alternative if insurance prefers.    Gestational diabetes       cod liver oil Caps capsule           nitroFURantoin (macrocrystal-monohydrate) 100 MG capsule    MACROBID    14 capsule    Take 1 capsule (100 mg) by mouth 2 times daily    Dysuria       opium-belladonna 30-16.2 MG per suppository    B&O SUPPRETTES    12 suppository    Place 0.5 suppositories rectally every 8 hours as needed for moderate pain or bladder spasms    Postoperative state       prenatal multivitamin plus iron 27-0.8 MG Tabs per tablet     100 tablet    Take 1 tablet by mouth daily    High-risk pregnancy, young multigravida in first trimester       * Notice:  This list has 4 medication(s) that are the same as other medications prescribed for you. Read the directions carefully, and ask your doctor or other care provider to review them with you.

## 2018-10-08 NOTE — PROGRESS NOTES
SUBJECTIVE: Halie is here for a 6-week postpartum checkup.    Delivery date was 08/31/18. She had a repeat c/s of a viable girl, weight 7 pounds 11.5 oz., with Breast fed complications.  Since delivery, she has been breast feeding.  She has concerns regarding GDM follow-up?  Some pain with urination and BM's since deliver.  She is not pregnant.  We discussed contraceptions and she has chosen Parguard IUD.  She  has not had intercourse since delivery and complains of No discomfort. Patient screened for postpartum depression and complaints are NEGATIVE. Screening has also been completed for intimate partner violence.    EXAM:  Today's Depression Rating was   PHQ-9 SCORE 10/8/2018   Total Score -   Total Score 0     Nurse assisted visit.  Giselle Mckinney MA.    GENERAL healthy, alert and no distress  GI: NEGATIVE  : NEGATIVE  PSYCH: NEGATIVE    ASSESSMENT:   Normal postpartum exam after repeat c/s.    PLAN:  Return as needed or at time of next expected pap, pelvic, or breast exam.        SUBJECTIVE: Halie Rendon is a 33 year old female  female. OB Patient's last menstrual period was 11/25/2017..  She is here for an IUD insertion. The details of insertion of IUD were reviewed, as well as the risks of pain, infection and bleeding. Risks also include risk of uterine perforation, PID and possible pregnancy.  All questions were answered before proceeding, and informed consent was therefore obtained.    Allergies   Allergen Reactions     Blood Transfusion Related (Informational Only)      Patient has a history of a clinically significant antibody against RBC antigens.  A delay in compatible RBCs may occur.     Current Outpatient Prescriptions   Medication Sig Dispense Refill     cod liver oil CAPS capsule   0     Prenatal Vit-Fe Fumarate-FA (PRENATAL MULTIVITAMIN PLUS IRON) 27-0.8 MG TABS per tablet Take 1 tablet by mouth daily 100 tablet 3     acetone, Urine, test STRP 1 strip as needed (with unexplained  blood glucose over 300 mg/dL) Use one strip daily for 1 week, then weekly if negative. 50 each 3     blood glucose monitoring (ACCU-CHEK FASTCLIX) lancets 1 each daily Use to test blood sugar 4 times daily or as directed. 102 each 3     blood glucose monitoring (ONE TOUCH ULTRASOFT) lancets Use to test blood sugar 4 times daily.  Ok to substitute alternative if insurance prefers. 150 each 5     blood glucose monitoring (ONETOUCH VERIO IQ) test strip Use to test blood sugar 4 times daily.  Ok to substitute alternative if insurance prefers. 150 strip 5     blood glucose monitoring (ONETOUCH VERIO) meter device kit Use to test blood sugar 4 times daily.  Ok to substitute alternative if insurance prefers. 1 kit 0     Blood Glucose Monitoring Suppl (ONETOUCH VERIO FLEX SYSTEM) w/Device KIT 1 kit 4 times daily Sample meter  LOT: [id number]  SN: [id number]       nitroFURantoin, macrocrystal-monohydrate, (MACROBID) 100 MG capsule Take 1 capsule (100 mg) by mouth 2 times daily 14 capsule 0     opium-belladonna (B&O SUPPRETTES) 30-16.2 MG per suppository Place 0.5 suppositories rectally every 8 hours as needed for moderate pain or bladder spasms 12 suppository 0      Past Medical History:   Diagnosis Date     Gestational diabetes mellitus     diet controlled     Nephrolithiasis     has a kidney stone     Postpartum depression     No medication     Varicella vaccination     documented immunity     Family History   Problem Relation Age of Onset     Breast Cancer Mother 56     Hypertension Father      HEART DISEASE Paternal Grandfather      MI-     Breast Cancer Maternal Grandmother       age 89     Cancer Other      maternal great grandmother cervical or uterine cancer      Social History     Social History     Marital status:      Spouse name: N/A     Number of children: 1     Years of education: N/A     Occupational History     self-employed/artist      Social History Main Topics     Smoking  status: Former Smoker     Smokeless tobacco: Never Used      Comment: rare cigarette use     Alcohol use No      Comment: rare     Drug use: No      Comment: Did smoke appx. 10 years ago     Sexual activity: Yes     Partners: Male     Other Topics Concern     Blood Transfusions No     Seat Belt Yes     Parent/Sibling W/ Cabg, Mi Or Angioplasty Before 65f 55m? No     Social History Narrative    Halie lives with her spouse      Past Surgical History:   Procedure Laterality Date      SECTION N/A 10/2/2014    Procedure:  SECTION;  Surgeon: Domenic Timmons MD;  Location: [facility]      SECTION N/A 2018    Procedure:  SECTION;  repeat  section;  Surgeon: Domenic Timmons MD;  Location: [facility]     DENTAL SURGERY         EXAM:  /64 (BP Location: Right arm, Patient Position: Chair, Cuff Size: Adult Regular)  Pulse 68  Wt 132 lb (59.9 kg)  LMP 2017  Breastfeeding? Yes  BMI 24.14 kg/m2  Abdomen: soft, nontender, without hepatosplenomegaly or masses  : normal cervix, adnexae, and uterus without masses or discharge  IUD type: Paragard T-380    ASSESSMENT/PLAN:  (Z39.2) Routine postpartum follow-up  (primary encounter diagnosis)    (Z97.5) IUD (intrauterine device) in place  Plan: INSERTION INTRAUTERINE DEVICE          Procedure Note:      -cervix; visualized with lighted speculum      -painted with Betadine      -grasped with tenaculum      -uterus sounded to 9 cm      -Paraguard placed easily on first attempt      -strings shortened  All instruments removed from vagina,cervix and uterus          Instructions given to patient regarding checking IUD strings and anticipated vaginal bleeding.  Follow up appointment in six weeks.    SUBJECTIVE:  Halie Rendon is an 33 year old  P2 woman who presents for pap smear        Past Medical History:   Diagnosis Date     Gestational diabetes mellitus     diet controlled     Nephrolithiasis     has a kidney stone      Postpartum depression     No medication     Varicella vaccination     documented immunity     Past Surgical History:   Procedure Laterality Date      SECTION N/A 10/2/2014    Procedure:  SECTION;  Surgeon: Domenic Timmons MD;  Location:  L[facility]D      SECTION N/A 2018    Procedure:  SECTION;  repeat  section;  Surgeon: Domenic Timmons MD;  Location: [facility]     DENTAL SURGERY       Current Outpatient Prescriptions   Medication     cod liver oil CAPS capsule     Prenatal Vit-Fe Fumarate-FA (PRENATAL MULTIVITAMIN PLUS IRON) 27-0.8 MG TABS per tablet     acetone, Urine, test STRP     blood glucose monitoring (ACCU-CHEK FASTCLIX) lancets     blood glucose monitoring (ONE TOUCH ULTRASOFT) lancets     blood glucose monitoring (ONETOUCH VERIO IQ) test strip     blood glucose monitoring (ONETOUCH VERIO) meter device kit     Blood Glucose Monitoring Suppl (ONETOUCH VERIO FLEX SYSTEM) w/Device KIT     nitroFURantoin, macrocrystal-monohydrate, (MACROBID) 100 MG capsule     opium-belladonna (B&O SUPPRETTES) 30-16.2 MG per suppository     No current facility-administered medications for this visit.      Allergies   Allergen Reactions     Blood Transfusion Related (Informational Only)      Patient has a history of a clinically significant antibody against RBC antigens.  A delay in compatible RBCs may occur.     Social History   Substance Use Topics     Smoking status: Former Smoker     Smokeless tobacco: Never Used      Comment: rare cigarette use     Alcohol use No      Comment: rare       Review of Systems  CONSTITUTIONAL:NEGATIVE  GI: NEGATIVE  : NEGATIVE  PSYCHIATRIC: NEGATIVE    OBJECTIVE:  /64 (BP Location: Right arm, Patient Position: Chair, Cuff Size: Adult Regular)  Pulse 68  Wt 132 lb (59.9 kg)  LMP 2017  Breastfeeding? Yes  BMI 24.14 kg/m2   EXAM:  GENERAL APPEARANCE: healthy, alert and no distress  ABDOMEN: soft, nontender, without  hepatosplenomegaly or masses    Pelvic Exam:  Vulva: No external lesions, normal hair distribution, no adenopathy  Vagina: Moist, pink, no abnormal discharge, well rugated, no lesions  Cervix: Pap smear is taken, parous, smooth, pink, no visible lesions  Uterus: Normal size, anteverted, non-tender, mobile  Ovaries: No mass, non-tender, mobile      ASSESSMENT:  Satisfactory annual gyn exam    PLAN:  (Z39.2) Routine postpartum follow-up  (primary encounter diagnosis)  Plan: Pap imaged thin layer screen with HPV -         recommended age 30 - 65 years (select HPV order        below), HPV High Risk Types DNA Cervical            (Z97.5) IUD (intrauterine device) in place  Plan: INSERTION INTRAUTERINE DEVICE          PE: reviewed health maintenance including diet, regular exercise and periodic exams.  Health Maintenance   Topic Date Due     PAP SCREENING Q3 YR (SYSTEM ASSIGNED)  03/21/2017     INFLUENZA VACCINE (1) 09/01/2018     PHQ-2 Q1 YR  10/13/2018     TETANUS IMMUNIZATION (SYSTEM ASSIGNED)  06/26/2028     HIV SCREEN (SYSTEM ASSIGNED)  Completed

## 2018-10-08 NOTE — LETTER
[facility] [address]  [address]  [facility] [address]  [phone]        January 24, 2019    Halie Rendon  [address] [place] [address]              Dear Halie Rendon    This is to remind you that your non-fasting lab is due.    You may call our office at [phone] to schedule an appointment.    Please disregard this notice if you have already had your labs drawn or made an appointment.        Sincerely,        Domenic Timmons MD

## 2018-10-08 NOTE — NURSING NOTE
"Chief Complaint   Patient presents with     Post Partum Exam       Initial /64 (BP Location: Right arm, Patient Position: Chair, Cuff Size: Adult Regular)  Pulse 68  Wt 132 lb (59.9 kg)  LMP 11/25/2017  Breastfeeding? Yes  BMI 24.14 kg/m2 Estimated body mass index is 24.14 kg/(m^2) as calculated from the following:    Height as of 8/31/18: 5' 2\" (1.575 m).    Weight as of this encounter: 132 lb (59.9 kg).  Medication Reconciliation: complete     Nurse assisted visit.  Giselle Mckinney MA.      "

## 2018-10-09 ASSESSMENT — PATIENT HEALTH QUESTIONNAIRE - PHQ9: SUM OF ALL RESPONSES TO PHQ QUESTIONS 1-9: 0

## 2018-10-09 NOTE — NURSING NOTE
paragard IUD inserted  Exp:  10/2024  Lot #:  [id number]  :  WALDO  NDC:  31874-902-19  Giselle Mckinney ma

## 2018-10-12 LAB
COPATH REPORT: NORMAL
PAP: NORMAL

## 2018-10-15 LAB
FINAL DIAGNOSIS: NORMAL
HPV HR 12 DNA CVX QL NAA+PROBE: NEGATIVE
HPV16 DNA SPEC QL NAA+PROBE: NEGATIVE
HPV18 DNA SPEC QL NAA+PROBE: NEGATIVE
SPECIMEN DESCRIPTION: NORMAL
SPECIMEN SOURCE CVX/VAG CYTO: NORMAL

## 2018-10-27 ENCOUNTER — NURSE TRIAGE (OUTPATIENT)
Dept: NURSING | Facility: CLINIC | Age: 33
End: 2018-10-27

## 2018-10-27 DIAGNOSIS — N61.0 MASTITIS: Primary | ICD-10-CM

## 2018-10-27 RX ORDER — DICLOXACILLIN SODIUM 250 MG
250 CAPSULE ORAL 4 TIMES DAILY
Qty: 28 CAPSULE | Refills: 0 | Status: SHIPPED | OUTPATIENT
Start: 2018-10-27 | End: 2019-02-24

## 2018-10-28 NOTE — TELEPHONE ENCOUNTER
"    Reason for Disposition    [1] Red area AND [2] fever    Additional Information    Negative: Chest pain    Negative: Patient is breastfeeding    Negative: Postpartum breast pain and swelling, not breastfeeding    Negative: Small spot, skin growth or mole    Negative: [1] SEVERE breast pain AND [2] fever > 103 F (39.4 C)    Negative: Patient sounds very sick or weak to the triager    Answer Assessment - Initial Assessment Questions  1. SYMPTOM: \"What's the main symptom you're concerned about?\"  (e.g., lump, pain, rash, nipple discharge)      Right breast is red, warm, hard area  2. LOCATION: \"Where is the _______ located?\"      Right breast   3. ONSET: \"When did ________  start?\"      24 hours ago  4. PRIOR HISTORY: \"Do you have any history of prior problems with your breasts?\" (e.g., lumps, cancer, fibrocystic breast disease)      denies  5. CAUSE: \"What do you think is causing this symptom?\"      ?mastitis  6. OTHER SYMPTOMS: \"Do you have any other symptoms?\" (e.g., fever, breast pain, redness or rash, nipple discharge)      Has chills, no discharge  7. PREGNANCY-BREASTFEEDING: \"Is there any chance you are pregnant?\" \"When was your last menstrual period?\" \"Are you breastfeeding?\"      Breast feeding, 8 weeks post partum    Protocols used: BREAST SYMPTOMS-ADULT-AH      "

## 2018-10-29 ENCOUNTER — PRE VISIT (OUTPATIENT)
Dept: PEDIATRICS | Facility: CLINIC | Age: 33
End: 2018-10-29

## 2018-10-29 ENCOUNTER — ALLIED HEALTH/NURSE VISIT (OUTPATIENT)
Dept: NURSING | Facility: CLINIC | Age: 33
End: 2018-10-29
Payer: COMMERCIAL

## 2018-10-29 DIAGNOSIS — Z23 NEED FOR PROPHYLACTIC VACCINATION AND INOCULATION AGAINST INFLUENZA: Primary | ICD-10-CM

## 2018-10-29 PROCEDURE — 90471 IMMUNIZATION ADMIN: CPT

## 2018-10-29 PROCEDURE — 90686 IIV4 VACC NO PRSV 0.5 ML IM: CPT

## 2018-10-29 NOTE — MR AVS SNAPSHOT
After Visit Summary   10/29/2018    Halie Rendon    MRN: [id number]           Patient Information     Date Of Birth          1985        Visit Information        Provider Department      10/29/2018 12:00 PM [facility] PEDIATRICS - NURSE [facility]        Today's Diagnoses     Need for prophylactic vaccination and inoculation against influenza    -  1       Follow-ups after your visit        Who to contact     If you have questions or need follow up information about today's clinic visit or your schedule please contact [facility] directly at [phone].  Normal or non-critical lab and imaging results will be communicated to you by Phone Warriorhart, letter or phone within 4 business days after the clinic has received the results. If you do not hear from us within 7 days, please contact the clinic through Q.MEt or phone. If you have a critical or abnormal lab result, we will notify you by phone as soon as possible.  Submit refill requests through [facility] or call your pharmacy and they will forward the refill request to us. Please allow 3 business days for your refill to be completed.          Additional Information About Your Visit        MyChart Information     [facility] gives you secure access to your electronic health record. If you see a primary care provider, you can also send messages to your care team and make appointments. If you have questions, please call your primary care clinic.  If you do not have a primary care provider, please call [phone] and they will assist you.        Care EveryWhere ID     This is your Care EveryWhere ID. This could be used by other organizations to access your [facility] medical records  [id number]         Blood Pressure from Last 3 Encounters:   10/08/18 110/64   09/14/18 100/62   09/03/18 116/74    Weight from Last 3 Encounters:   10/08/18 132 lb (59.9 kg)   09/14/18 127 lb (57.6 kg)   08/31/18 146 lb (66.2 kg)               We Performed the Following     FLU VACCINE, SPLIT VIRUS, IM (QUADRIVALENT) [96391]- >3 YRS     Vaccine Administration, Initial [84669]        Primary Care Provider Office Phone # Fax #    Domenic Timmons [phone] [address] [place] [address]        Equal Access to Services     [facility]: Hadii aad ku hadasho Soomaali, waaxda luqadaha, qaybta kaalmada adeegyada, waxay idiin hayaan adeeg kharash la'aan . So [facility] [phone].    ATENCIÓN: Si habla español, tiene a loaiza disposición servicios gratuitos de asistencia lingüística. Llame al [phone].    We comply with applicable federal civil rights laws and [place] laws. We do not discriminate on the basis of race, color, national origin, age, disability, sex, sexual orientation, or gender identity.            Thank you!     Thank you for choosing [facility]  for your care. Our goal is always to provide you with excellent care. Hearing back from our patients is one way we can continue to improve our services. Please take a few minutes to complete the written survey that you may receive in the mail after your visit with us. Thank you!             Your Updated Medication List - Protect others around you: Learn how to safely use, store and throw away your medicines at www.disposemymeds.org.          This list is accurate as of 10/29/18 12:13 PM.  Always use your most recent med list.                   Brand Name Dispense Instructions for use Diagnosis    acetone (urine) test strip    KETOSTIX    50 each    1 strip as needed (with unexplained blood glucose over 300 mg/dL) Use one strip daily for 1 week, then weekly if negative.    Gestational diabetes       * blood glucose monitoring lancets     150 each    Use to test blood sugar 4 times daily.  Ok to substitute alternative if insurance prefers.    Gestational diabetes       * blood glucose monitoring lancets     102 each    1 each  daily Use to test blood sugar 4 times daily or as directed.    Abnormal glucose tolerance test       * blood glucose monitoring meter device kit     1 kit    Use to test blood sugar 4 times daily.  Ok to substitute alternative if insurance prefers.    Gestational diabetes       * ONETOUCH VERIO FLEX SYSTEM w/Device Kit      1 kit 4 times daily Sample meter LOT: [id number] SN: [id number]    Gestational diabetes       blood glucose monitoring test strip    ONETOUCH VERIO IQ    150 strip    Use to test blood sugar 4 times daily.  Ok to substitute alternative if insurance prefers.    Gestational diabetes       cod liver oil Caps capsule           dicloxacillin 250 MG capsule    DYNAPEN    28 capsule    Take 1 capsule (250 mg) by mouth 4 times daily    Mastitis       nitroFURantoin (macrocrystal-monohydrate) 100 MG capsule    MACROBID    14 capsule    Take 1 capsule (100 mg) by mouth 2 times daily    Dysuria       opium-belladonna 30-16.2 MG per suppository    B&O SUPPRETTES    12 suppository    Place 0.5 suppositories rectally every 8 hours as needed for moderate pain or bladder spasms    Postoperative state       prenatal multivitamin plus iron 27-0.8 MG Tabs per tablet     100 tablet    Take 1 tablet by mouth daily    High-risk pregnancy, young multigravida in first trimester       * Notice:  This list has 4 medication(s) that are the same as other medications prescribed for you. Read the directions carefully, and ask your doctor or other care provider to review them with you.

## 2018-10-29 NOTE — PROGRESS NOTES

## 2018-11-08 ENCOUNTER — OFFICE VISIT (OUTPATIENT)
Dept: PEDIATRICS | Facility: CLINIC | Age: 33
End: 2018-11-08
Attending: OBSTETRICS & GYNECOLOGY
Payer: COMMERCIAL

## 2018-11-08 ENCOUNTER — LACTATION ENCOUNTER (OUTPATIENT)
Age: 33
End: 2018-11-08

## 2018-11-08 PROCEDURE — G0463 HOSPITAL OUTPT CLINIC VISIT: HCPCS | Mod: ZF

## 2018-11-08 NOTE — CONSULTS
"LACTATION CONSULT/PHYSICIAN REPORT    MOTHER    Doctor: Dr. Timmons     MOTHER'S CONCERNS  Following mastitis, Halie had a decrease in milk production. She is concerned that her baby is not getting enough breastmilk so she has been nursing every hours at times and also fusses during nursing.  She has been supplemented and bottle fed frequently because of her fussiness at breast.  Overall, concern about milk production and transfer.    Medical History  none    Pregnancy History       Breastfeeding History  Pump and bottle only    Delivery History  Repeat     Labor Meds/Anesthesia  Spinal    Current Medications  Prenatal Vitamins: Yes  Cod liver oil and previously antibiotics for mastitis, ended course a few days ago    Herbals:  none    ASSESSMENT OF MOTHER    Physical:   WNL h/o mastitis     Milk Supply: Decreased since mastitis but otherwise WNL    PUMPING: [employer]      HOME CARE VISIT:        BABY       Name: Carly YOB: 2017 Age: 10w Gestational Age: 40    Doctor: Dr. Alfaro     Complications of Birth: None    Breastfeeding/hospital: No problems      ASSESSMENT OF BABY    Physical:   WNL     CURRENT BREASTFEEDING ISSUES:   Fussiness at breast, feeding frequently due to Halie's concern about infant weight, however she has not been demanding extra milk.  Halie awakens her to nurse her even in the night.      SUPPLEMENTATION: EBM extra 20mL post feeding     OUTPUT:   WNL       BABY'S WEIGHT HISTORY  Growth Chart Detail 2018   Height 1' 9\"    1' 9\"   Weight 7 lb 11.5 oz 7 lb 12 oz 7 lb 3 oz 7 lb 1 oz 7 lb 14.5 oz   Head Cir 13.976    14.25   BMI (Calculated) 12.33    12.63   Height percentile 98.8    81.9   Weight percentile 71.6 72.7 51.6 43.4 37.7     Growth Chart Detail 10/29/2018 10/29/2018   Height 1' 11.95\" 1' 10.835\"   Weight 10 lb 7 oz    Head Cir 15    BMI (Calculated) 12.82    Height percentile 71.8 71.8   Weight percentile " 30.5              Age: 10w  Date: 11/8/2018  Weight: 5038 g, 11lb 1.7oz       Since last weight, baby has a gain of 10.7 oz.in 10 days.  Note: Normal weight gain is 1/2 to 1 oz/day in the first 6 months of life.    FEEDING ASSESSMENT  BEFORE INTERVENTION: Pain Levels: L: 0  R: 0  AFTER INTERVENTION: Pain Levels: L: 0  R: 0    INTERVENTIONS/EVALUATION:  Cross Cradle, Asymmetric Latch and Flange lips    WEIGHT GAIN AT BREAST: (NOTE: 30cc = 1 oz):  At current weight, baby needs approximately 29 oz in 24 hours or 3.6 oz every 3 hours   ( 108 mL)    Number order of breastfeeding    20 mL 2nd LEFT breast after 10 minutes 24 mL 1st RIGHT breast after 7 minutes  She was irritable about being undressed and comfort sucked on a pacifier.  She went back on breast on both sides and had letdown on both sides, so not an accurate evaluation of how much she took in during hour visit.      TOTAL: 44 cc or 1.46 oz after 17 minutes    SUMMARY  Overall, she is gaining weight well and within expectatations. She did not take expected volumes during this visit, however once dressed nursed well again.  Halie is reportedly overly concerned about her milk volumes and awakening [place] in the night when she likely does not need to be.        RECOMMENDATIONS  Plan to pump post feeds until Saturday.  Nurse every 2-3 hours, but try to space feedings out more consistently.  Can save that milk if she appears fussy post feeding, but not necessary to top her off with each feeding.    When  returns on Saturday, start exclusively breastfeeding her so you no longer are pumping post feeds.  May pump once in the evening prior to bedtime so length of feedings are not spaced over 4 hours.  Reweigh in one week via home scale or Pediatrician, or Lactation scale.  Weight gain should remain 1/2 oz to 1 oz per day.  Weight her post feeding.      Follow up: Patient to call lactation consultant if questions arise. Call to report infant weight and update  nursing progress.    Next visit/Phone call: Lactation Consultant: Call in one week.      Visit Start time: 12:05    End time: 1:43    Lactation Consultant: Gabriela Padilla RN  Date: 11/8/2018    .    [facility]  Phone: [phone]     Fax: [phone]

## 2018-11-08 NOTE — LACTATION NOTE
"This note was copied from a baby's chart.  REPORT     MOTHER     Doctor: Dr. Timmons                MOTHER'S CONCERNS  Following mastitis, Halie had a decrease in milk production. She is concerned that her baby is not getting enough breastmilk so she has been nursing every hours at times and also fusses during nursing.  She has been supplemented and bottle fed frequently because of her fussiness at breast.  Overall, concern about milk production and transfer.     Medical History  none     Pregnancy History        Breastfeeding History  Pump and bottle only     Delivery History  Repeat      Labor Meds/Anesthesia  Spinal     Current Medications  Prenatal Vitamins: Yes  Cod liver oil and previously antibiotics for mastitis, ended course a few days ago     Herbals:  none     ASSESSMENT OF MOTHER     Physical:   WNL h/o mastitis      Milk Supply: Decreased since mastitis but otherwise WNL     PUMPING: [employer]        HOME CARE VISIT:           BABY                                                          Name: Carly                   YOB: 2017                  Age: 10w                Gestational Age: 40     Doctor: Dr. Alfaro      Complications of Birth: None     Breastfeeding/hospital: No problems        ASSESSMENT OF BABY     Physical:   WNL      CURRENT BREASTFEEDING ISSUES:   Fussiness at breast, feeding frequently due to Halie's concern about infant weight, however she has not been demanding extra milk.  Halie awakens her to nurse her even in the night.       SUPPLEMENTATION: EBM extra 20mL post feeding      OUTPUT:   WNL         BABY'S WEIGHT HISTORY  Growth Chart Detail 2018   Height 1' 9\"       1' 9\"   Weight 7 lb 11.5 oz 7 lb 12 oz 7 lb 3 oz 7 lb 1 oz 7 lb 14.5 oz   Head Cir 13.976       14.25   BMI (Calculated) 12.33       12.63   Height percentile 98.8       81.9   Weight percentile 71.6 72.7 51.6 43.4 37.7      Growth Chart " "Detail 10/29/2018 10/29/2018   Height 1' 11.95\" 1' 10.835\"   Weight 10 lb 7 oz     Head Cir 15     BMI (Calculated) 12.82     Height percentile 71.8 71.8   Weight percentile 30.5                  Age: 10w                                         Date: 11/8/2018                                          Weight: 5038 g, 11lb 1.7oz                                                                Since last weight, baby has a gain of 10.7 oz.in 10 days.  Note: Normal weight gain is 1/2 to 1 oz/day in the first 6 months of life.     FEEDING ASSESSMENT  BEFORE INTERVENTION: Pain Levels: L: 0  R: 0  AFTER INTERVENTION: Pain Levels: L: 0  R: 0     INTERVENTIONS/EVALUATION:  Cross Cradle, Asymmetric Latch and Flange lips     WEIGHT GAIN AT BREAST: (NOTE: 30cc = 1 oz):  At current weight, baby needs approximately 29 oz in 24 hours or 3.6 oz every 3 hours   ( 108 mL)     Number order of breastfeeding     20 mL 2nd LEFT breast after 10 minutes                 24 mL 1st RIGHT breast after 7 minutes  She was irritable about being undressed and comfort sucked on a pacifier.  She went back on breast on both sides and had letdown on both sides, so not an accurate evaluation of how much she took in during hour visit.       TOTAL: 44 cc or 1.46 oz after 17 minutes     SUMMARY  Overall, she is gaining weight well and within expectatations. She did not take expected volumes during this visit, however once dressed nursed well again.  Halie is reportedly overly concerned about her milk volumes and awakening [place] in the night when she likely does not need to be.          RECOMMENDATIONS  Plan to pump post feeds until Saturday.  Nurse every 2-3 hours, but try to space feedings out more consistently.  Can save that milk if she appears fussy post feeding, but not necessary to top her off with each feeding.     When  returns on Saturday, start exclusively breastfeeding her so you no longer are pumping post feeds.  May pump once in the " evening prior to bedtime so length of feedings are not spaced over 4 hours.  Reweigh in one week via home scale or Pediatrician, or Lactation scale.  Weight gain should remain 1/2 oz to 1 oz per day.  Weight her post feeding.        Follow up: Patient to call lactation consultant if questions arise. Call to report infant weight and update nursing progress.     Next visit/Phone call: Lactation Consultant: Call in one week.        Visit Start time: 12:05                                                                              End time: 1:43     Lactation Consultant: Gabriela Padilla RN                                            Date: 11/8/2018     .     [facility]  Phone: [phone]     Fax: [phone]

## 2019-02-24 ENCOUNTER — NURSE TRIAGE (OUTPATIENT)
Dept: NURSING | Facility: CLINIC | Age: 34
End: 2019-02-24

## 2019-02-24 ENCOUNTER — ANCILLARY PROCEDURE (OUTPATIENT)
Dept: GENERAL RADIOLOGY | Facility: CLINIC | Age: 34
End: 2019-02-24
Attending: PHYSICIAN ASSISTANT
Payer: COMMERCIAL

## 2019-02-24 ENCOUNTER — OFFICE VISIT (OUTPATIENT)
Dept: URGENT CARE | Facility: URGENT CARE | Age: 34
End: 2019-02-24
Payer: COMMERCIAL

## 2019-02-24 VITALS
TEMPERATURE: 98.8 F | HEIGHT: 62 IN | BODY MASS INDEX: 24.29 KG/M2 | WEIGHT: 132 LBS | HEART RATE: 66 BPM | OXYGEN SATURATION: 98 % | SYSTOLIC BLOOD PRESSURE: 118 MMHG | DIASTOLIC BLOOD PRESSURE: 66 MMHG

## 2019-02-24 DIAGNOSIS — S99.922A INJURY OF TOE ON LEFT FOOT, INITIAL ENCOUNTER: Primary | ICD-10-CM

## 2019-02-24 DIAGNOSIS — S99.922A INJURY OF TOE ON LEFT FOOT, INITIAL ENCOUNTER: ICD-10-CM

## 2019-02-24 PROCEDURE — 73660 X-RAY EXAM OF TOE(S): CPT | Mod: LT

## 2019-02-24 PROCEDURE — 99213 OFFICE O/P EST LOW 20 MIN: CPT | Performed by: PHYSICIAN ASSISTANT

## 2019-02-24 RX ORDER — MULTIPLE VITAMINS W/ MINERALS TAB 9MG-400MCG
1 TAB ORAL DAILY
COMMUNITY

## 2019-02-24 ASSESSMENT — MIFFLIN-ST. JEOR: SCORE: 1257

## 2019-02-24 NOTE — TELEPHONE ENCOUNTER
About one hour ago walking up stairs and hit xiomara toe hard on step on left foot and is starting to swell and cannot walk.  Halie is requesting to have it put back in place if dislocated.      Reason for Disposition    [1] Toe injury AND [2] bad limp or can't wear shoes/sandals    Additional Information    Negative: [1] Major bleeding (e.g., spurting blood) AND [2] can't be stopped    Negative: Amputated toe    Negative: Skin is split open or gaping  (or length > 1/2 inch or 12 mm)    Negative: [1] Bleeding AND [2] won't stop after 10 minutes of direct pressure (using correct technique)    Negative: [1] Dirt in the wound AND [2] not removed with 15 minutes of scrubbing    Negative: Sounds like a serious injury to the triager    Negative: [1] SEVERE pain AND [2] not improved 2 hours after pain medicine/ice packs    Negative: [1] MODERATE-SEVERE pain AND [2] blood present under the toenail    Negative: Looks like a broken bone (e.g., crooked or deformed)    Negative: Looks like a dislocated joint (e.g., crooked or deformed)    Negative: Toenail is completely torn off (toenail avulsion)    Negative: Base of toenail has popped out of the skin fold (nail base dislocation)    Protocols used: TOE INJURY-ADULT-

## 2019-02-25 NOTE — PROGRESS NOTES
SUBJECTIVE:  Chief Complaint   Patient presents with     Musculoskeletal Problem     pinkie toe on left foot injury, x 5pm today, caught on stairs, took some Tylenol     Halie Rendon is a 34 year old female presents with a chief complaint of left toe(s) great and fifth pain.  The injury occurred 3 hour(s) ago.   The injury happened while at home. How: caught toe on stairs.  The patient complained of moderate pain  and has had decreased ROM.  Pain exacerbated by flexion/extension.  Relieved by rest.  She treated it initially with Tylenol. This is the first time this type of injury has occurred to this patient.     Past Medical History:   Diagnosis Date     Gestational diabetes mellitus     diet controlled     Nephrolithiasis     has a kidney stone     Postpartum depression 2014    No medication     Varicella vaccination 2014    documented immunity     Current Outpatient Medications   Medication Sig Dispense Refill     multivitamin w/minerals (MULTI-VITAMIN) tablet Take 1 tablet by mouth daily       Prenatal Vit-Fe Fumarate-FA (PRENATAL MULTIVITAMIN PLUS IRON) 27-0.8 MG TABS per tablet Take 1 tablet by mouth daily 100 tablet 3     acetone, Urine, test STRP 1 strip as needed (with unexplained blood glucose over 300 mg/dL) Use one strip daily for 1 week, then weekly if negative. (Patient not taking: Reported on 2/24/2019) 50 each 3     blood glucose monitoring (ACCU-CHEK FASTCLIX) lancets 1 each daily Use to test blood sugar 4 times daily or as directed. (Patient not taking: Reported on 2/24/2019) 102 each 3     blood glucose monitoring (ONE TOUCH ULTRASOFT) lancets Use to test blood sugar 4 times daily.  Ok to substitute alternative if insurance prefers. (Patient not taking: Reported on 2/24/2019) 150 each 5     blood glucose monitoring (ONETOUCH VERIO IQ) test strip Use to test blood sugar 4 times daily.  Ok to substitute alternative if insurance prefers. (Patient not taking: Reported on 2/24/2019) 150 strip 5  "    blood glucose monitoring (ONETOUCH VERIO) meter device kit Use to test blood sugar 4 times daily.  Ok to substitute alternative if insurance prefers. (Patient not taking: Reported on 2/24/2019) 1 kit 0     Blood Glucose Monitoring Suppl (ONETOUCH VERIO FLEX SYSTEM) w/Device KIT 1 kit 4 times daily Sample meter  LOT: [id number]  SN: [email or web address] (Patient not taking: Reported on 2/24/2019)       Social History     Tobacco Use     Smoking status: Former Smoker     Smokeless tobacco: Never Used     Tobacco comment: rare cigarette use   Substance Use Topics     Alcohol use: No     Comment: rare       ROS:  Review of systems negative except as stated above.    EXAM:   /66 (BP Location: Right arm, Patient Position: Chair, Cuff Size: Adult Regular)   Pulse 66   Temp 98.8  F (37.1  C) (Oral)   Ht 1.575 m (5' 2\")   Wt 59.9 kg (132 lb)   SpO2 98%   BMI 24.14 kg/m    Gen: healthy,alert,no distress  Extremity: no bony abnormality  Pulses are +2 and CRT is brisk  EXTREMITIES: peripheral pulses normal  SKIN: no suspicious lesions or rashes  NEURO: Normal strength and tone, sensory exam grossly normal      X-RAY WNL on initial read    ASSESSMENT:   (S92.550G) Injury of toe on left foot, initial encounter  (primary encounter diagnosis)  Comment: likely strain  Plan: XR Toe Left G/E 2 Views      Patient Instructions       Patient Education     Toe Sprain  A sprain is a stretching or tearing of the ligaments that hold a joint together. There are no broken bones. Sprains generally take from 3-6 weeks to heal. A toe sprain may be treated by taping the injured toe to the next toe. This is called charly taping. This protects the injured toe and holds it in position. Mild sprains may not need any additional support. If the toenail has been hurt badly, it may fall off in 1-2 weeks. A new one will usually start to grow back within a month.     Home care    Keep your leg elevated above heart level when sitting or lying down. This is " very important during the first 48 hours to reduce swelling. Stay off the injured foot as much as possible until you can walk on it without pain. If needed, you may use crutches during the first week for this purpose. Crutches can be rented at many pharmacies or surgical/orthopedic supply stores.    You may be given a cast shoe to wear to prevent movement in your toe. If not, you can use a sandal or any shoe that does not put pressure on the injured toe until the swelling and pain go away. If using a sandal, be careful not to hit your foot against anything, since another injury could make the sprain worse.    Apply an ice pack over the injured area for 15 to 20 minutes every 3 to 6 hours. You should do this for the first 24 to 48 hours. You can make an ice pack by filling a plastic bag that seals at the top with ice cubes and then wrapping it with a thin towel. Continue to use ice packs for relief of pain and swelling as needed. As the ice melts, be careful to avoid getting your wrap, splint, or cast wet. As the ice melts, be careful to avoid getting any wrap, splint, tape, or cast wet. After 48 hours, apply heat from a warm shower or bath for 20 minutes several times daily. Alternating ice and heat may also be helpful.    If buddy tape was applied and it becomes wet or dirty, change it. You may replace it with paper, plastic or cloth tape. Cloth tape and paper tapes must be kept dry. Apply gauze or cotton padding between the toes, especially near webbed area. This will help prevent the skin from getting moist and breaking down. Keep the buddy tape in place for at least 4 weeks, or as advised by your healthcare provider.    You may use over-the-counter pain medicine to control pain, unless another pain medicine was prescribed. If you have chronic liver or kidney disease or ever had a stomach ulcer or gastrointestinal bleeding, talk with your healthcare provider before using these medicines.    You may return to  sports after healing, when you can run without pain.  Follow-up care  Follow up with your with your healthcare provider as advised. Sometimes fractures don t show up on the first X-ray. Bruises and sprains can sometimes hurt as much as a fracture. These injuries can take time to heal completely. If your symptoms don t improve or they get worse, talk with your healthcare provider. You may need a repeat X-ray. If X-rays were taken, you will be told of any new findings that may affect your care.     When to seek medical advice  Call your healthcare provider right away if any of these occur:    Redness, warmth, or fluid drainage from your toe    Pain or swelling increases    Toes become cold, blue, numb, or tingly   Date Last Reviewed: 5/1/2018 2000-2018 [employer]. [address] [place] [address], [place] [address]. All rights reserved. This information is not intended as a substitute for professional medical care. Always follow your healthcare professional's instructions.

## 2019-02-25 NOTE — PATIENT INSTRUCTIONS
Patient Education     Toe Sprain  A sprain is a stretching or tearing of the ligaments that hold a joint together. There are no broken bones. Sprains generally take from 3-6 weeks to heal. A toe sprain may be treated by taping the injured toe to the next toe. This is called buddy taping. This protects the injured toe and holds it in position. Mild sprains may not need any additional support. If the toenail has been hurt badly, it may fall off in 1-2 weeks. A new one will usually start to grow back within a month.     Home care    Keep your leg elevated above heart level when sitting or lying down. This is very important during the first 48 hours to reduce swelling. Stay off the injured foot as much as possible until you can walk on it without pain. If needed, you may use crutches during the first week for this purpose. Crutches can be rented at many pharmacies or surgical/orthopedic supply stores.    You may be given a cast shoe to wear to prevent movement in your toe. If not, you can use a sandal or any shoe that does not put pressure on the injured toe until the swelling and pain go away. If using a sandal, be careful not to hit your foot against anything, since another injury could make the sprain worse.    Apply an ice pack over the injured area for 15 to 20 minutes every 3 to 6 hours. You should do this for the first 24 to 48 hours. You can make an ice pack by filling a plastic bag that seals at the top with ice cubes and then wrapping it with a thin towel. Continue to use ice packs for relief of pain and swelling as needed. As the ice melts, be careful to avoid getting your wrap, splint, or cast wet. As the ice melts, be careful to avoid getting any wrap, splint, tape, or cast wet. After 48 hours, apply heat from a warm shower or bath for 20 minutes several times daily. Alternating ice and heat may also be helpful.    If buddy tape was applied and it becomes wet or dirty, change it. You may replace it with  paper, plastic or cloth tape. Cloth tape and paper tapes must be kept dry. Apply gauze or cotton padding between the toes, especially near webbed area. This will help prevent the skin from getting moist and breaking down. Keep the charly tape in place for at least 4 weeks, or as advised by your healthcare provider.    You may use over-the-counter pain medicine to control pain, unless another pain medicine was prescribed. If you have chronic liver or kidney disease or ever had a stomach ulcer or gastrointestinal bleeding, talk with your healthcare provider before using these medicines.    You may return to sports after healing, when you can run without pain.  Follow-up care  Follow up with your with your healthcare provider as advised. Sometimes fractures don t show up on the first X-ray. Bruises and sprains can sometimes hurt as much as a fracture. These injuries can take time to heal completely. If your symptoms don t improve or they get worse, talk with your healthcare provider. You may need a repeat X-ray. If X-rays were taken, you will be told of any new findings that may affect your care.     When to seek medical advice  Call your healthcare provider right away if any of these occur:    Redness, warmth, or fluid drainage from your toe    Pain or swelling increases    Toes become cold, blue, numb, or tingly   Date Last Reviewed: 5/1/2018 2000-2018 [employer] [address] [place] [address], [place] [address]. All rights reserved. This information is not intended as a substitute for professional medical care. Always follow your healthcare professional's instructions.

## 2019-04-17 ENCOUNTER — COMMUNICATION - HEALTHEAST (OUTPATIENT)
Dept: TELEHEALTH | Facility: CLINIC | Age: 34
End: 2019-04-17

## 2019-04-17 ENCOUNTER — COMMUNICATION - HEALTHEAST (OUTPATIENT)
Dept: FAMILY MEDICINE | Facility: CLINIC | Age: 34
End: 2019-04-17

## 2019-04-17 ENCOUNTER — OFFICE VISIT - HEALTHEAST (OUTPATIENT)
Dept: FAMILY MEDICINE | Facility: CLINIC | Age: 34
End: 2019-04-17

## 2019-04-17 DIAGNOSIS — M62.08 RECTUS DIASTASIS: ICD-10-CM

## 2019-04-17 DIAGNOSIS — Z86.32 H/O GESTATIONAL DIABETES MELLITUS, NOT CURRENTLY PREGNANT: ICD-10-CM

## 2019-04-17 DIAGNOSIS — Z30.432 ENCOUNTER FOR IUD REMOVAL: ICD-10-CM

## 2019-04-17 DIAGNOSIS — Z86.2 H/O: IRON DEFICIENCY ANEMIA: ICD-10-CM

## 2019-04-17 DIAGNOSIS — N89.8 VAGINAL ITCHING: ICD-10-CM

## 2019-04-17 DIAGNOSIS — K62.89 RECTAL PAIN: ICD-10-CM

## 2019-04-17 DIAGNOSIS — Z76.89 ENCOUNTER TO ESTABLISH CARE: ICD-10-CM

## 2019-04-17 LAB
CLUE CELLS: NORMAL
ERYTHROCYTE [DISTWIDTH] IN BLOOD BY AUTOMATED COUNT: 11.7 % (ref 11–14.5)
HBA1C MFR BLD: 5.3 % (ref 3.5–6.1)
HCT VFR BLD AUTO: 44.5 % (ref 35–47)
HGB BLD-MCNC: 15 G/DL (ref 12–16)
MCH RBC QN AUTO: 32.5 PG (ref 27–34)
MCHC RBC AUTO-ENTMCNC: 33.8 G/DL (ref 32–36)
MCV RBC AUTO: 96 FL (ref 80–100)
PLATELET # BLD AUTO: 241 THOU/UL (ref 140–440)
PMV BLD AUTO: 7.3 FL (ref 7–10)
RBC # BLD AUTO: 4.64 MILL/UL (ref 3.8–5.4)
TRICHOMONAS, WET PREP: NORMAL
TSH SERPL DL<=0.005 MIU/L-ACNC: 0.55 UIU/ML (ref 0.3–5)
WBC: 8.1 THOU/UL (ref 4–11)
YEAST, WET PREP: NORMAL

## 2019-04-17 ASSESSMENT — MIFFLIN-ST. JEOR: SCORE: 1236.19

## 2019-04-30 ENCOUNTER — TELEPHONE (OUTPATIENT)
Dept: NURSING | Facility: CLINIC | Age: 34
End: 2019-04-30

## 2019-04-30 ENCOUNTER — OFFICE VISIT (OUTPATIENT)
Dept: URGENT CARE | Facility: URGENT CARE | Age: 34
End: 2019-04-30
Payer: COMMERCIAL

## 2019-04-30 VITALS
SYSTOLIC BLOOD PRESSURE: 98 MMHG | OXYGEN SATURATION: 97 % | TEMPERATURE: 97.8 F | HEART RATE: 62 BPM | DIASTOLIC BLOOD PRESSURE: 60 MMHG

## 2019-04-30 DIAGNOSIS — H92.09 OTALGIA, UNSPECIFIED LATERALITY: Primary | ICD-10-CM

## 2019-04-30 PROCEDURE — 99213 OFFICE O/P EST LOW 20 MIN: CPT | Performed by: PHYSICIAN ASSISTANT

## 2019-04-30 NOTE — TELEPHONE ENCOUNTER
Patient calling. Not currently a patient here in , has seen OB before. Has developed a strange sensation in right ear. Feels full and is ringing. Does not really hurt. Having trouble hearing. No fever that she knows of but feels a little run down. Daughter has cold. Patient is not currently pregant. No swelling, redness, drainage of ear. Patient concerned for possible ear infection.      RECOMMENDED DISPOSITION:  See in 24 hours - offered appt here with PA but patient declined and said she is just going to go to the  across the street from her  Will comply with recommendation: Yes    Guideline used:  Telephone Triage Protocols for Nurses, Fifth Edition, Dana Jaimes RN

## 2019-05-10 NOTE — PROGRESS NOTES
SUBJECTIVE:  Halie Rendon is a 34 year old female who presents with right ear fullness for 1 day(s).   Severity: mild   Timing:sudden onset  Additional symptoms include none.      History of recurrent otitis: no    Past Medical History:   Diagnosis Date     Gestational diabetes mellitus     diet controlled     Nephrolithiasis     has a kidney stone     Postpartum depression 2014    No medication     Varicella vaccination 2014    documented immunity     Current Outpatient Medications   Medication Sig Dispense Refill     multivitamin w/minerals (MULTI-VITAMIN) tablet Take 1 tablet by mouth daily       acetone, Urine, test STRP 1 strip as needed (with unexplained blood glucose over 300 mg/dL) Use one strip daily for 1 week, then weekly if negative. (Patient not taking: Reported on 2/24/2019) 50 each 3     blood glucose monitoring (ACCU-CHEK FASTCLIX) lancets 1 each daily Use to test blood sugar 4 times daily or as directed. (Patient not taking: Reported on 2/24/2019) 102 each 3     blood glucose monitoring (ONE TOUCH ULTRASOFT) lancets Use to test blood sugar 4 times daily.  Ok to substitute alternative if insurance prefers. (Patient not taking: Reported on 2/24/2019) 150 each 5     blood glucose monitoring (ONETOUCH VERIO IQ) test strip Use to test blood sugar 4 times daily.  Ok to substitute alternative if insurance prefers. (Patient not taking: Reported on 2/24/2019) 150 strip 5     blood glucose monitoring (ONETOUCH VERIO) meter device kit Use to test blood sugar 4 times daily.  Ok to substitute alternative if insurance prefers. (Patient not taking: Reported on 2/24/2019) 1 kit 0     Blood Glucose Monitoring Suppl (ONETOUCH VERIO FLEX SYSTEM) w/Device KIT 1 kit 4 times daily Sample meter  LOT: [id number]  SN: [email or web address] (Patient not taking: Reported on 2/24/2019)       Prenatal Vit-Fe Fumarate-FA (PRENATAL MULTIVITAMIN PLUS IRON) 27-0.8 MG TABS per tablet Take 1 tablet by mouth daily (Patient not taking:  Reported on 4/30/2019) 100 tablet 3     Social History     Tobacco Use     Smoking status: Former Smoker     Smokeless tobacco: Never Used     Tobacco comment: rare cigarette use   Substance Use Topics     Alcohol use: No     Comment: rare       ROS:   Review of systems negative except as stated above.    OBJECTIVE:  BP 98/60 (Cuff Size: Adult Regular)   Pulse 62   Temp 97.8  F (36.6  C) (Oral)   SpO2 97%    EXAM:  The right TM is retracted     The right auditory canal is normal and without drainage, edema or erythema  The left TM is normal: no effusions, no erythema, and normal landmarks  The left auditory canal is normal and without drainage, edema or erythema  Oropharynx exam is normal: no lesions, erythema, adenopathy or exudate.  GENERAL: no acute distress  EYES:  conjunctiva clear  NECK: supple, non-tender to palpation, no adenopathy noted  RESP: lungs clear to auscultation - no rales, rhonchi or wheezes  CV: regular rates and rhythm, normal S1 S2, no murmur noted  SKIN: no suspicious lesions or rashes         ASSESSMENT:  (H92.09) Otalgia, unspecified laterality  (primary encounter diagnosis)  Comment: eustachian tube dysfunction  Plan: NSAIDS, antihistamines, yue pot    Follow up with PCP if symptoms worsen or fail to improve  In 1-2 weeks

## 2019-06-10 NOTE — PROGRESS NOTES
Patient was seen on 11/08/2018 prior to change in charting practices.  Chart remained open until 6-. Charting previously performed and visit resolved.

## 2019-08-27 ENCOUNTER — OFFICE VISIT (OUTPATIENT)
Dept: FAMILY MEDICINE | Facility: CLINIC | Age: 34
End: 2019-08-27
Payer: COMMERCIAL

## 2019-08-27 VITALS
SYSTOLIC BLOOD PRESSURE: 102 MMHG | WEIGHT: 127 LBS | HEART RATE: 76 BPM | OXYGEN SATURATION: 97 % | BODY MASS INDEX: 23.23 KG/M2 | TEMPERATURE: 97.9 F | DIASTOLIC BLOOD PRESSURE: 58 MMHG

## 2019-08-27 DIAGNOSIS — O24.410 DIET CONTROLLED GESTATIONAL DIABETES MELLITUS (GDM) IN FIRST TRIMESTER: ICD-10-CM

## 2019-08-27 DIAGNOSIS — Z82.79 FAMILY HISTORY OF CONGENITAL ANOMALIES: ICD-10-CM

## 2019-08-27 DIAGNOSIS — Z76.89 ENCOUNTER TO ESTABLISH CARE: ICD-10-CM

## 2019-08-27 DIAGNOSIS — R53.83 OTHER FATIGUE: Primary | ICD-10-CM

## 2019-08-27 DIAGNOSIS — M25.539 ARTHRALGIA OF WRIST, UNSPECIFIED LATERALITY: ICD-10-CM

## 2019-08-27 LAB
CRP SERPL-MCNC: 3.4 MG/L (ref 0–8)
ERYTHROCYTE [DISTWIDTH] IN BLOOD BY AUTOMATED COUNT: 12 % (ref 10–15)
ERYTHROCYTE [SEDIMENTATION RATE] IN BLOOD BY WESTERGREN METHOD: 5 MM/H (ref 0–20)
HBA1C MFR BLD: 4.9 % (ref 0–5.6)
HCT VFR BLD AUTO: 41.1 % (ref 35–47)
HGB BLD-MCNC: 14 G/DL (ref 11.7–15.7)
MCH RBC QN AUTO: 32.2 PG (ref 26.5–33)
MCHC RBC AUTO-ENTMCNC: 34.1 G/DL (ref 31.5–36.5)
MCV RBC AUTO: 95 FL (ref 78–100)
PLATELET # BLD AUTO: 256 10E9/L (ref 150–450)
RBC # BLD AUTO: 4.35 10E12/L (ref 3.8–5.2)
WBC # BLD AUTO: 6.7 10E9/L (ref 4–11)

## 2019-08-27 PROCEDURE — 36415 COLL VENOUS BLD VENIPUNCTURE: CPT | Performed by: FAMILY MEDICINE

## 2019-08-27 PROCEDURE — 86140 C-REACTIVE PROTEIN: CPT | Performed by: FAMILY MEDICINE

## 2019-08-27 PROCEDURE — 83036 HEMOGLOBIN GLYCOSYLATED A1C: CPT | Performed by: FAMILY MEDICINE

## 2019-08-27 PROCEDURE — 86038 ANTINUCLEAR ANTIBODIES: CPT | Performed by: FAMILY MEDICINE

## 2019-08-27 PROCEDURE — 86431 RHEUMATOID FACTOR QUANT: CPT | Performed by: FAMILY MEDICINE

## 2019-08-27 PROCEDURE — 85652 RBC SED RATE AUTOMATED: CPT | Performed by: FAMILY MEDICINE

## 2019-08-27 PROCEDURE — 82306 VITAMIN D 25 HYDROXY: CPT | Performed by: FAMILY MEDICINE

## 2019-08-27 PROCEDURE — 86039 ANTINUCLEAR ANTIBODIES (ANA): CPT | Performed by: FAMILY MEDICINE

## 2019-08-27 PROCEDURE — 86225 DNA ANTIBODY NATIVE: CPT | Performed by: FAMILY MEDICINE

## 2019-08-27 PROCEDURE — 99214 OFFICE O/P EST MOD 30 MIN: CPT | Performed by: FAMILY MEDICINE

## 2019-08-27 PROCEDURE — 80053 COMPREHEN METABOLIC PANEL: CPT | Performed by: FAMILY MEDICINE

## 2019-08-27 PROCEDURE — 84443 ASSAY THYROID STIM HORMONE: CPT | Performed by: FAMILY MEDICINE

## 2019-08-27 PROCEDURE — 85027 COMPLETE CBC AUTOMATED: CPT | Performed by: FAMILY MEDICINE

## 2019-08-27 PROCEDURE — 93000 ELECTROCARDIOGRAM COMPLETE: CPT | Performed by: FAMILY MEDICINE

## 2019-08-27 NOTE — PROGRESS NOTES
"Subjective     Halie Rendon is a 34 year old female who presents to clinic today for the following health issues:    HPI   Concern - Establish care   Onset:     Description:    pt wanting to Establish care with a primary Doctor     Intensity:     Progression of Symptoms:  Daughter was diagnosed with congential heart block,  at age [age] year.     So she is she now worried about some kind of autoimmune problem. She thinks mom was not sure of any condition lupus etc in the family, but she is just wondering if \" she herself could be having some thing like lupus \",.  She is mostly concerned because she has ongoing symptoms of fatigue,  has achy joints and muscle aches often.  no swollen red joints that she can recall    , has some urinary sx on and off frequency urgency etc schedule for something new or different now.     . Has had UTI few times and may have had one episode of UTI with gross hematuria, during pregnancy and she was seen by nephrologist and they never confirmed any cause for it , and her symptoms did  resolved .  She has no recurrence of any hematuria since but she is just worried and wants  some antibody test to check for lupus make sure she is ok .    She also has a history of gestational diabetes with her both pregnancies, mostly diet-controlled.     Accompanying Signs & Symptoms:  Joint and muscle aches skin itching, no fevers or chills.  Feels somewhat stressed gets headaches on and off, she thinks it could be just that she is been more emotional with her daughter's diagnosis    Previous history of similar problem: none but had gestational diabetes mellitus.  Mostly diet controlled    -Diabetic diet    -Continue home diabetes regimen    -Start sliding-scale insulin with both pregnancy       Therapies Tried and outcome: Takes OTC pain med's as needed sometimes       Patient Active Problem List   Diagnosis     Family history of spina bifida     Personal history of renal calculi     IUD (intrauterine " device) in place     Past Surgical History:   Procedure Laterality Date      SECTION N/A 10/2/2014    Procedure:  SECTION;  Surgeon: Domenic Timmons MD;  Location: [facility]      SECTION N/A 2018    Procedure:  SECTION;  repeat  section;  Surgeon: Domenic Timmons MD;  Location: [facility]     DENTAL SURGERY         Social History     Tobacco Use     Smoking status: Former Smoker     Smokeless tobacco: Never Used     Tobacco comment: rare cigarette use   Substance Use Topics     Alcohol use: No     Comment: rare     Family History   Problem Relation Age of Onset     Breast Cancer Mother 56     Hypertension Father      Prostate Cancer Father      Heart Disease Paternal Grandfather         MI-     Breast Cancer Maternal Grandmother          age 89     Cancer Other         maternal great grandmother cervical or uterine cancer     Colon Cancer No family hx of            Reviewed and updated as needed this visit by Provider         Review of Systems   ROS COMP: Constitutional, HEENT, cardiovascular, pulmonary, GI, , musculoskeletal, neuro, skin, endocrine and psych systems are negative, except as otherwise noted.      Objective    /58   Pulse 76   Temp 97.9  F (36.6  C) (Tympanic)   Wt 57.6 kg (127 lb)   LMP 2019   SpO2 97%   BMI 23.23 kg/m    Body mass index is 23.23 kg/m .  Physical Exam   GENERAL: healthy, alert and no distress  EYES: Eyes grossly normal to inspection,  and conjunctivae and sclerae normal  HENT: oropharynx clear and oral mucous membranes moist  NECK: no adenopathy, no asymmetry, masses, or scars and thyroid normal to palpation  RESP: lungs clear to auscultation - no rales, rhonchi or wheezes  CV: regular rate and rhythm, normal S1 S2, no S3 or S4, no murmur no peripheral edema   ABDOMEN: soft, nontender, no hepatosplenomegaly, no masses and bowel sounds normal  MS: normal range of motion without any palpable  tenderness in the finger /hand joints etc.  and no leg edema  SKIN: no suspicious lesions or rashes  PSYCH: mentation appears normal, speech pressured slightly anxious and emotional             Assessment & Plan     (Z76.89) Encounter to establish care  Comment:   Plan:       (R53.83) Other fatigue  (primary encounter diagnosis)  Comment:   Plan: TSH with free T4 reflex, Vitamin D Deficiency,         Comprehensive metabolic panel, CBC with         platelets, Rheumatoid factor, Hemoglobin A1c,         DNA double stranded antibodies, Anti Nuclear         Mena IgG by IFA with Reflex, ESR: Erythrocyte         sedimentation rate            (M25.539) Arthralgia of wrist, unspecified laterality  Comment:   Plan: TSH with free T4 reflex, Vitamin D Deficiency,         Comprehensive metabolic panel, CBC with         platelets, Rheumatoid factor, DNA double         stranded antibodies, Anti Nuclear Mena IgG by         IFA with Reflex, CRP, inflammation, ESR:         Erythrocyte sedimentation rate            (O24.410) Diet controlled gestational diabetes mellitus (GDM) in first trimester  Comment:   Plan: Hemoglobin A1c            (Z82.79) Family history of congenital anomalies  Comment: daugther with herat block at age one   Plan: EKG 12-lead complete w/read - Clinics                 Patient ongoing symptoms of fatigue arthralgias and myalgias, also worried about possible autoimmune process because of  recent diagnosis for her daughter with congenital heart block.   Also has history of gestational diabetes.   She wished to proceed with the lab/EKG.  EKG is normal sinus bradycardia.  No previous for comparison.  Check labs. Cares and symptomatic treatment discussed.  She declined any prescription pain medication for her symptoms   follow up if any ongoing problem or concerns.    Patient expressed understanding and agreement with treatment plan. All patient's questions were answered, will let me know if has more  later.              Return in about 4 weeks (around 9/24/2019) for Physical Exam.    Altagracia Willis MD  [facility]

## 2019-08-28 DIAGNOSIS — M25.539 ARTHRALGIA OF WRIST, UNSPECIFIED LATERALITY: Primary | ICD-10-CM

## 2019-08-28 DIAGNOSIS — R53.83 OTHER FATIGUE: ICD-10-CM

## 2019-08-28 LAB
ALBUMIN SERPL-MCNC: 4.3 G/DL (ref 3.4–5)
ALP SERPL-CCNC: 126 U/L (ref 40–150)
ALT SERPL W P-5'-P-CCNC: 20 U/L (ref 0–50)
ANA PAT SER IF-IMP: ABNORMAL
ANA PAT SER IF-IMP: ABNORMAL
ANA SER QL IF: POSITIVE
ANA TITR SER IF: ABNORMAL {TITER}
ANA TITR SER IF: ABNORMAL {TITER}
ANION GAP SERPL CALCULATED.3IONS-SCNC: 9 MMOL/L (ref 3–14)
AST SERPL W P-5'-P-CCNC: 15 U/L (ref 0–45)
BILIRUB SERPL-MCNC: 0.2 MG/DL (ref 0.2–1.3)
BUN SERPL-MCNC: 18 MG/DL (ref 7–30)
CALCIUM SERPL-MCNC: 9.4 MG/DL (ref 8.5–10.1)
CHLORIDE SERPL-SCNC: 108 MMOL/L (ref 94–109)
CO2 SERPL-SCNC: 25 MMOL/L (ref 20–32)
CREAT SERPL-MCNC: 0.51 MG/DL (ref 0.52–1.04)
DEPRECATED CALCIDIOL+CALCIFEROL SERPL-MC: 30 UG/L (ref 20–75)
DSDNA AB SER-ACNC: 1 IU/ML
GFR SERPL CREATININE-BSD FRML MDRD: >90 ML/MIN/{1.73_M2}
GLUCOSE SERPL-MCNC: 98 MG/DL (ref 70–99)
POTASSIUM SERPL-SCNC: 4.1 MMOL/L (ref 3.4–5.3)
PROT SERPL-MCNC: 7.5 G/DL (ref 6.8–8.8)
RHEUMATOID FACT SER NEPH-ACNC: <20 IU/ML (ref 0–20)
SODIUM SERPL-SCNC: 142 MMOL/L (ref 133–144)
TSH SERPL DL<=0.005 MIU/L-ACNC: 0.47 MU/L (ref 0.4–4)

## 2019-09-09 ENCOUNTER — ALLIED HEALTH/NURSE VISIT (OUTPATIENT)
Dept: NURSING | Facility: CLINIC | Age: 34
End: 2019-09-09
Payer: COMMERCIAL

## 2019-09-09 DIAGNOSIS — Z23 NEED FOR PROPHYLACTIC VACCINATION AND INOCULATION AGAINST INFLUENZA: Primary | ICD-10-CM

## 2019-09-09 DIAGNOSIS — R53.82 CHRONIC FATIGUE: Primary | ICD-10-CM

## 2019-09-09 DIAGNOSIS — M25.539 ARTHRALGIA OF WRIST, UNSPECIFIED LATERALITY: ICD-10-CM

## 2019-09-09 DIAGNOSIS — M25.50 MULTIPLE JOINT PAIN: ICD-10-CM

## 2019-09-09 DIAGNOSIS — R53.83 OTHER FATIGUE: ICD-10-CM

## 2019-09-09 PROCEDURE — 86618 LYME DISEASE ANTIBODY: CPT | Performed by: PEDIATRICS

## 2019-09-09 PROCEDURE — 86235 NUCLEAR ANTIGEN ANTIBODY: CPT | Mod: 59 | Performed by: PEDIATRICS

## 2019-09-09 PROCEDURE — 86235 NUCLEAR ANTIGEN ANTIBODY: CPT | Performed by: PEDIATRICS

## 2019-09-09 PROCEDURE — 90471 IMMUNIZATION ADMIN: CPT

## 2019-09-09 PROCEDURE — 90686 IIV4 VACC NO PRSV 0.5 ML IM: CPT

## 2019-09-09 PROCEDURE — 36415 COLL VENOUS BLD VENIPUNCTURE: CPT | Performed by: PEDIATRICS

## 2019-09-11 ENCOUNTER — OFFICE VISIT (OUTPATIENT)
Dept: OBGYN | Facility: CLINIC | Age: 34
End: 2019-09-11
Payer: COMMERCIAL

## 2019-09-11 VITALS — WEIGHT: 127 LBS | DIASTOLIC BLOOD PRESSURE: 62 MMHG | BODY MASS INDEX: 23.23 KG/M2 | SYSTOLIC BLOOD PRESSURE: 110 MMHG

## 2019-09-11 DIAGNOSIS — N89.8 ITCHING OF VAGINA: Primary | ICD-10-CM

## 2019-09-11 LAB
B BURGDOR IGG+IGM SER QL: 0.09 (ref 0–0.89)
ENA SS-A IGG SER IA-ACNC: <0.2 AI (ref 0–0.9)
ENA SS-B IGG SER IA-ACNC: <0.2 AI (ref 0–0.9)
SPECIMEN SOURCE: NORMAL
WET PREP SPEC: NORMAL

## 2019-09-11 PROCEDURE — 87210 SMEAR WET MOUNT SALINE/INK: CPT | Performed by: OBSTETRICS & GYNECOLOGY

## 2019-09-11 PROCEDURE — 99213 OFFICE O/P EST LOW 20 MIN: CPT | Performed by: OBSTETRICS & GYNECOLOGY

## 2019-09-11 RX ORDER — ESTRADIOL 0.1 MG/G
1 CREAM VAGINAL DAILY
Qty: 42.5 G | Refills: 0 | Status: SHIPPED | OUTPATIENT
Start: 2019-09-11 | End: 2020-06-22

## 2019-09-11 ASSESSMENT — ANXIETY QUESTIONNAIRES
IF YOU CHECKED OFF ANY PROBLEMS ON THIS QUESTIONNAIRE, HOW DIFFICULT HAVE THESE PROBLEMS MADE IT FOR YOU TO DO YOUR WORK, TAKE CARE OF THINGS AT HOME, OR GET ALONG WITH OTHER PEOPLE: SOMEWHAT DIFFICULT
6. BECOMING EASILY ANNOYED OR IRRITABLE: NEARLY EVERY DAY
3. WORRYING TOO MUCH ABOUT DIFFERENT THINGS: NEARLY EVERY DAY
7. FEELING AFRAID AS IF SOMETHING AWFUL MIGHT HAPPEN: NEARLY EVERY DAY
5. BEING SO RESTLESS THAT IT IS HARD TO SIT STILL: SEVERAL DAYS
1. FEELING NERVOUS, ANXIOUS, OR ON EDGE: NEARLY EVERY DAY
GAD7 TOTAL SCORE: 19
2. NOT BEING ABLE TO STOP OR CONTROL WORRYING: NEARLY EVERY DAY

## 2019-09-11 ASSESSMENT — PATIENT HEALTH QUESTIONNAIRE - PHQ9
5. POOR APPETITE OR OVEREATING: NEARLY EVERY DAY
SUM OF ALL RESPONSES TO PHQ QUESTIONS 1-9: 13

## 2019-09-11 NOTE — PROGRESS NOTES
SUBJECTIVE:                                                   Halie Rendon is a 34 year old female who presents to clinic today for the following health issue(s):  Patient presents with:  Vaginal Problem: vaginal itching since late in pregnancy  Pelvic Pain: around time of menses, currently breastfeeding so not currently getting periods but worried about pain once she gets them again      HPI:  New patient to our clinic --here today to discuss ongoing labial/vulvar itching/irritation.  First noted at the end of her first pregnancy and has continued over last year.  All external itching.  No signficant discharge.  Tried a vaginal moisturizer for a few weeks earlier this summer without improvement.  +SA --infrequently so a bit uncomfortable.  Using condoms for contraception.  Had paragard IUD placed postpartum --had removed in April to see if it was causing her itching --no improvement noted.  No other self treatments.  Showers only.  No baths.  No soaps or cleansers.  Has never had issues prior to last .    Also has some concerns about hx of rectal pain during her menses.  Had started to work with Dr. Timmons but didn't follow up.  Had talked about some type of suppository.  No issues today --has not resumed menses since she is still breastfeeding.  Reports brief sharp shooting pains in her rectum the day prior to and the first day of her menses.  Has been going on x ~4-5yrs.  NO other pain or cramping.    Breast feeding around the clock.  Nearly 3yo and nearly 5yo at home  Eating/drinking well  No bowel/bladder issues.  No pain or burning with urination  Recently saw Dr. Willis for PCP to establish cares; also had testing for rheum conditions due to new dx of heart block for her youngest daughter --will be seeing rheumatology in the near future    Patient's last menstrual period was 2019..   Patient is sexually active, .  Using condoms for contraception.    reports that she has quit smoking.  She has never used smokeless tobacco.    STD testing offered?  Declined    Health maintenance updated:  yes    Today's PHQ-2 Score:   PHQ-2 (  [employer]) 2019   Q1: Little interest or pleasure in doing things 0   Q2: Feeling down, depressed or hopeless 1   PHQ-2 Score 1     Today's PHQ-9 Score:   PHQ-9 SCORE 2019   PHQ-9 Total Score -   PHQ-9 Total Score 13     Today's GINGER-7 Score:   GINGER-7 SCORE 2019   Total Score 19       Problem list and histories reviewed & adjusted, as indicated.  Additional history: as documented.    Patient Active Problem List   Diagnosis     Family history of spina bifida     Personal history of renal calculi     Diet controlled gestational diabetes mellitus (GDM) in first trimester     IUD (intrauterine device) in place     Family history of congenital anomalies     Past Surgical History:   Procedure Laterality Date      SECTION N/A 10/2/2014    Procedure:  SECTION;  Surgeon: Domenic Timmons MD;  Location:  L+D      SECTION N/A 2018    Procedure:  SECTION;  repeat  section;  Surgeon: Domenic Timmons MD;  Location:  L+D     [facility]  2006      Social History     Tobacco Use     Smoking status: Former Smoker     Smokeless tobacco: Never Used     Tobacco comment: rare cigarette use   Substance Use Topics     Alcohol use: No     Comment: rare      Problem (# of Occurrences) Relation (Name,Age of Onset)    Breast Cancer (2) Mother (56), Maternal Grandmother:  age 89    Cancer (1) Other: maternal great grandmother cervical or uterine cancer    Heart Disease (1) Paternal Grandfather: MI-    Hypertension (1) Father    Prostate Cancer (1) Father       Negative family history of: Colon Cancer            Current Outpatient Medications   Medication Sig     estradiol (ESTRACE) 0.1 MG/GM vaginal cream Place 1 g vaginally daily Place 1g vaginally daily for 2 weeks     multivitamin w/minerals (MULTI-VITAMIN)  tablet Take 1 tablet by mouth daily     No current facility-administered medications for this visit.      No Known Allergies    ROS:  12 point review of systems negative other than symptoms noted below.  Constitutional: Fatigue  Eyes: Spots  Head: Nasal Congestion, Ringing and Sore Throat  Genitourinary: No Periods, Painful [place], Pelvic Pain, Significant PMS, Spotting and Vaginal Itching  Skin: Skin Dryness  Musculoskeletal: Joint Pain  Endocrine: Decreased Libido  Psychiatric: Anxiety and Rausch    OBJECTIVE:     /62   Wt 57.6 kg (127 lb)   LMP 08/13/2019   Breastfeeding? Yes   BMI 23.23 kg/m    Body mass index is 23.23 kg/m .    Exam:  Constitutional:  Appearance: Well nourished, well developed alert, in no acute distress  Gastrointestinal:  Abdominal Examination:  Abdomen nontender to palpation, tone normal without rigidity or guarding, no masses present, umbilicus without lesions; Liver/Spleen:  No hepatomegaly present, liver nontender to palpation; Hernias:  No hernias present  Lymphatic: Lymph Nodes:  No other lymphadenopathy present  Skin:General Inspection:  No rashes present, no lesions present, no areas of discoloration; Genitalia and Groin:  No rashes present, no lesions present, no areas of discoloration, no masses present.  Neurologic/Psychiatric:  Mental Status:  Oriented X3   Pelvic Exam:  External Genitalia:     Normal appearance for age, no discharge present, no tenderness present, no inflammatory lesions present, color normal; NO REDNESS/ERYTHEMA NOTED OF LABIA OR CLITORAL MACKEY; NO SKIN CHANGES OR ABNORMALITIES; NO DISCHARGE  Vagina:     Normal vaginal vault without central or paravaginal defects, no discharge present, no inflammatory lesions present, no masses present  Bladder:     Nontender to palpation  Urethra:   Urethral Body:  Urethra palpation normal, urethra structural support normal   Urethral Meatus:  No erythema or lesions present  Cervix:     Appearance healthy, no  lesions present, nontender to palpation, no bleeding present  Uterus:     Uterus: firm, normal sized and nontender, midplane in position.   Adnexa:     No adnexal tenderness present, no adnexal masses present  Perineum:     Perineum within normal limits, no evidence of trauma, no rashes or skin lesions present  Anus:     Anus within normal limits, no hemorrhoids present  Inguinal Lymph Nodes:     No lymphadenopathy present  Pubic Hair:     Normal pubic hair distribution for age  Genitalia and Groin:     No rashes present, no lesions present, no areas of discoloration, no masses present       In-Clinic Test Results:  Results for orders placed or performed in visit on 09/11/19 (from the past 24 hour(s))   Wet prep   Result Value Ref Range    Specimen Description Vagina     Wet Prep No Trichomonas seen     Wet Prep No clue cells seen     Wet Prep No yeast seen     Wet Prep No WBC's seen        ASSESSMENT/PLAN:                                                        ICD-10-CM    1. Itching of vagina N89.8 Wet prep     estradiol (ESTRACE) 0.1 MG/GM vaginal cream       Patient Instructions   Reassurance given today with normal exam --no evidence of infection or dermatologic conditions.  Discussed a 2 week trial of vaginal/vulvar estrogen cream for possible atrophy with breast feeding.  Will monitor rectal pain with return of menses and contact us as needed.  Will continue working with PCP with regards to moods and other non GYN related concerns/issues.  Return in November for annual exam.      Gabriela Walters MD  [facility]

## 2019-09-11 NOTE — PATIENT INSTRUCTIONS
Reassurance given today with normal exam --no evidence of infection or dermatologic conditions.  Discussed a 2 week trial of vaginal/vulvar estrogen cream for possible atrophy with breast feeding.  Will monitor rectal pain with return of menses and contact us as needed.  Will continue working with PCP with regards to moods and other non GYN related concerns/issues.  Return in November for annual exam.

## 2019-09-12 ASSESSMENT — ANXIETY QUESTIONNAIRES: GAD7 TOTAL SCORE: 19

## 2019-09-12 NOTE — RESULT ENCOUNTER NOTE
La and Ro antibodies negative. Lyme negative as well. Good news, but still recommend rheumatology eval as a precaution given your symptoms. Baby's Lyme was negative as well.  Released to [employer] but please also send letter.    Gabrielle Mata MD on 9/12/2019 at 9:52 AM

## 2020-03-02 ENCOUNTER — HEALTH MAINTENANCE LETTER (OUTPATIENT)
Age: 35
End: 2020-03-02

## 2020-04-29 ENCOUNTER — VIRTUAL VISIT (OUTPATIENT)
Dept: INTERNAL MEDICINE | Facility: CLINIC | Age: 35
End: 2020-04-29
Payer: COMMERCIAL

## 2020-04-29 VITALS — BODY MASS INDEX: 21.26 KG/M2 | HEIGHT: 63 IN | WEIGHT: 120 LBS

## 2020-04-29 DIAGNOSIS — K06.9 GUM DISEASE: Primary | ICD-10-CM

## 2020-04-29 PROCEDURE — 99212 OFFICE O/P EST SF 10 MIN: CPT | Mod: GT | Performed by: PHYSICIAN ASSISTANT

## 2020-04-29 ASSESSMENT — MIFFLIN-ST. JEOR: SCORE: 1205.51

## 2020-04-29 NOTE — PROGRESS NOTES
"aHlie Rendon is a 35 year old female who is being evaluated via a billable video visit.      The patient has been notified of following:     \"This video visit will be conducted via a call between you and your physician/provider. We have found that certain health care needs can be provided without the need for an in-person physical exam.  This service lets us provide the care you need with a video conversation.  If a prescription is necessary we can send it directly to your pharmacy.  If lab work is needed we can place an order for that and you can then stop by our lab to have the test done at a later time.    Video visits are billed at different rates depending on your insurance coverage.  Please reach out to your insurance provider with any questions.    If during the course of the call the physician/provider feels a video visit is not appropriate, you will not be charged for this service.\"    Patient has given verbal consent for Video visit? Yes    How would you [place] to [place]    Patient would like the video invitation sent by: Text to cell phone: [phone]    Will anyone else be joining your video visit? No      Subjective     Halie Rendon is a 35 year old female who presents to clinic today for the following health issues:    HPI  Concern - Gums  Onset: this morning    Description:   Swelling on her gums on by the 2 front teeth    Intensity: mild    Progression of Symptoms:  same    Accompanying Signs & Symptoms:  No pain, but swollen    Previous history of similar problem:   This has happened once many years ago on a different part of the gums    Precipitating factors:   Worsened by: None    Alleviating factors:  Improved by: None    Therapies Tried and outcome: previously given mouthwash rx that worked.     Video Start Time:9:39AM      Reviewed and updated as needed this visit by Provider  Meds  Problems               Objective    Ht 1.588 m (5' 2.5\")   Wt 54.4 kg (120 lb)   " "BMI 21.60 kg/m    Estimated body mass index is 21.6 kg/m  as calculated from the following:    Height as of this encounter: 1.588 m (5' 2.5\").    Weight as of this encounter: 54.4 kg (120 lb).  Physical Exam     GENERAL: healthy, alert and no distress  EYES: Eyes grossly normal to inspection, conjunctivae and sclerae normal  ORAL: not able see swollen gums. No erythema noted, no tenderness when patient palpates the area   RESP: no audible wheeze, cough, or visible cyanosis.  No visible retractions or increased work of breathing.  Able to speak fully in complete sentences.  PSYCH: mentation appears normal, affect normal/bright, judgement and insight intact, normal speech and appearance well-groomed      Diagnostic Test Results:  Labs reviewed in Epic        Assessment & Plan     1. Gum disease  - unsure cause of symptoms   - patient to seek dental care  - pt agreeable to plan and all questions are answered         No follow-ups on file.    Amelie Max PA-C  [facility]      Video-Visit Details    Type of service:  Video Visit    Video End Time:9:42    Originating Location (pt. Location): Home    Distant Location (provider location):Home     Mode of Communication:  Video Conference via [employer]    No follow-ups on file.       Amelie Max PA-C      "

## 2020-06-21 ENCOUNTER — NURSE TRIAGE (OUTPATIENT)
Dept: NURSING | Facility: CLINIC | Age: 35
End: 2020-06-21

## 2020-06-21 NOTE — TELEPHONE ENCOUNTER
Halie is calling about vision changes that first occurred a few weeks ago and may be worsening.  - Colors seem brighter  - Pattern contrast is bothersome  - Distance vision: objects appear shaking or vibrating    Headache today with nausea - decreased with ibuprofen  Rated 3-4/10    Per protocol, ER advised.    COVID 19 Nurse Triage Plan/Patient Instructions    Please be aware that novel coronavirus (COVID-19) may be circulating in the community. If you develop symptoms such as fever, cough, or SOB or if you have concerns about the presence of another infection including coronavirus (COVID-19), please contact your health care provider or visit [email or web address].     Disposition/Instructions    Patient to go to ED and follow protocol based instructions.     Bring Your Own Device:  Please also bring your smart device(s) (smart phones, tablets, laptops) and their charging cables for your personal use and to communicate with your care team during your visit.    Thank you for taking steps to prevent the spread of this virus.  o Limit your contact with others.  o Wear a simple mask to cover your cough.  o Wash your hands well and often.    Resources    [facility]: About COVID-19: [email or web address]    CDC: What to Do If You're Sick: www.cdc.gov/coronavirus/2019-ncov/about/steps-when-sick.html    CDC: Ending Home Isolation: www.cdc.gov/coronavirus/2019-ncov/hcp/disposition-in-home-patients.html     CDC: Caring for Someone: www.cdc.gov/coronavirus/2019-ncov/if-you-are-sick/care-for-someone.html     [facility]: Interim Guidance for Hospital Discharge to Home: www.health.[facility].us/diseases/coronavirus/hcp/hospdischarge.pdf    [facility] clinical trials (COVID-19 research studies): [email or web address]     Below are the COVID-19 hotlines at the [facility] of Health ([facility]). Interpreters are available.   o For health questions: Call 771-262-4117 or 1-426.857.2056 (7 a.m.  to 7 p.m.)  o For questions about schools and childcare: Call 773-175-2127 or 1-813.137.5353 (7 a.m. to 7 p.m.)     Maria Guadalupe Catalan RN  [facility]      Reason for Disposition    [1] Blurred vision or visual changes AND [2] present now AND [3] sudden onset or new (e.g., minutes, hours, days)  (Exception: seeing floaters / black specks OR previously diagnosed migraine headaches with same symptoms)    Additional Information    Negative: Complete loss of vision in 1 or both eyes    Negative: SEVERE eye pain    Negative: Severe headache    Negative: Double vision    Protocols used: VISION LOSS OR CHANGE-A-AH

## 2020-06-22 ENCOUNTER — OFFICE VISIT (OUTPATIENT)
Dept: INTERNAL MEDICINE | Facility: CLINIC | Age: 35
End: 2020-06-22
Payer: COMMERCIAL

## 2020-06-22 VITALS
HEART RATE: 61 BPM | SYSTOLIC BLOOD PRESSURE: 100 MMHG | DIASTOLIC BLOOD PRESSURE: 56 MMHG | OXYGEN SATURATION: 94 % | RESPIRATION RATE: 14 BRPM | BODY MASS INDEX: 21.74 KG/M2 | TEMPERATURE: 97.6 F | WEIGHT: 120.8 LBS

## 2020-06-22 DIAGNOSIS — R51.9 NONINTRACTABLE EPISODIC HEADACHE, UNSPECIFIED HEADACHE TYPE: ICD-10-CM

## 2020-06-22 DIAGNOSIS — Z86.32 HISTORY OF GESTATIONAL DIABETES MELLITUS: ICD-10-CM

## 2020-06-22 DIAGNOSIS — H53.9 VISION CHANGES: Primary | ICD-10-CM

## 2020-06-22 LAB
ANION GAP SERPL CALCULATED.3IONS-SCNC: <1 MMOL/L (ref 3–14)
BUN SERPL-MCNC: 17 MG/DL (ref 7–30)
CALCIUM SERPL-MCNC: 9.1 MG/DL (ref 8.5–10.1)
CHLORIDE SERPL-SCNC: 105 MMOL/L (ref 94–109)
CO2 SERPL-SCNC: 31 MMOL/L (ref 20–32)
CREAT SERPL-MCNC: 0.74 MG/DL (ref 0.52–1.04)
GFR SERPL CREATININE-BSD FRML MDRD: >90 ML/MIN/{1.73_M2}
GLUCOSE SERPL-MCNC: 86 MG/DL (ref 70–99)
HBA1C MFR BLD: 5.1 % (ref 0–5.6)
POTASSIUM SERPL-SCNC: 4.2 MMOL/L (ref 3.4–5.3)
SODIUM SERPL-SCNC: 136 MMOL/L (ref 133–144)

## 2020-06-22 PROCEDURE — 99214 OFFICE O/P EST MOD 30 MIN: CPT | Performed by: PHYSICIAN ASSISTANT

## 2020-06-22 PROCEDURE — 36415 COLL VENOUS BLD VENIPUNCTURE: CPT | Performed by: PHYSICIAN ASSISTANT

## 2020-06-22 PROCEDURE — 80048 BASIC METABOLIC PNL TOTAL CA: CPT | Performed by: PHYSICIAN ASSISTANT

## 2020-06-22 PROCEDURE — 83036 HEMOGLOBIN GLYCOSYLATED A1C: CPT | Performed by: PHYSICIAN ASSISTANT

## 2020-06-22 NOTE — PROGRESS NOTES
Subjective     Halie Rendon is a 35 year old female who presents to clinic today for the following health issues:    HPI   Headaches      Duration: 1  month    Description  Location: behind both eyes   Character: throbbing pain  Frequency:   Every few weeks  Duration:   Couple of days    Intensity:  moderate    Accompanying signs and symptoms:    Precipitating or Alleviating factors:     weaning baby from breastfeeding  Nausea/vomiting: nausea at peak of headache  Dizziness: no  Weakness or numbness: no  Visual changes: none  Fever: no   Sinus or URI symptoms no     History  Head trauma: no   Family history of migraines: YES- mother  Previous tests for headaches: no  Neurologist evaluations: no  Able to do daily activities when headache present: no  Wake with headaches: YES- sometimes  Daily pain medication use: no  Any changes in: COVID changes    Precipitating or Alleviating factors (light/sound/sleep/caffeine): decreased caffiene months ago- in February. Has now been taking a caffeine supplement with an amino acid.   Total caffeine is 50 mg daily - she was drinking more than that in coffee. Was not having a headache until recently after the vision change started.     -bright lights and colors,- make headache worse     high contrast patterns-hard to look at computer screen-- if she has a headache.         Vision issues happen without a headache     But headache can make this worse.     Mid range vision with the jumpy vision issue.- feels like if there is a pattern that the pattern is jumpy         Therapies tried and outcome: Ibuprofen (Advil, Motrin) and Tylenol    Outcome - effective-no nausea or visual changes help  Frequent/daily pain medication use:no- only when needed for headaches  Eye exam for vision glasses etc in February     PMH of gestational DM - last labs in 2019 normal A1C then   No increased thirst or eating. No frequent urination       -------------------------------------    BP Readings from  Last 3 Encounters:   06/22/20 100/56   09/11/19 110/62   08/27/19 102/58    Wt Readings from Last 3 Encounters:   06/22/20 54.8 kg (120 lb 12.8 oz)   04/29/20 54.4 kg (120 lb)   09/11/19 57.6 kg (127 lb)                    Reviewed and updated as needed this visit by Provider         Review of Systems   Constitutional, HEENT, cardiovascular, pulmonary, gi and gu systems are negative, except as otherwise noted.      Objective    /56   Pulse 61   Temp 97.6  F (36.4  C)   Resp 14   Wt 54.8 kg (120 lb 12.8 oz)   SpO2 94%   BMI 21.74 kg/m    Body mass index is 21.74 kg/m .  Physical Exam   GENERAL: healthy, alert and no distress  EYES: Eyes grossly normal to inspection, PERRL and conjunctivae and sclerae normal  NECK: no adenopathy, no asymmetry, masses, or scars and thyroid normal to palpation  RESP: lungs clear to auscultation - no rales, rhonchi or wheezes  CV: regular rates and rhythm, normal S1 S2, no S3 or S4 and no murmur, click or rub  MS: no gross musculoskeletal defects noted, no edema  NEURO: Normal strength and tone, sensory exam grossly normal, mentation intact, cranial nerves 2-12 intact and DTR's normal and symmetric     Diagnostic Test Results:  Pending         Assessment & Plan     1. Vision changes  Will notify of results when available   If all normal then recommend to see opthlamology for full exam   - MR Brain w/o Contrast; Future  - Basic metabolic panel    2. Nonintractable episodic headache, unspecified headache type    - MR Brain w/o Contrast; Future    3. History of gestational diabetes mellitus    - Basic metabolic panel  - Hemoglobin A1c           Return for follow up pending results.    Giselle Payan PA-C  [facility]

## 2020-06-29 ENCOUNTER — TELEPHONE (OUTPATIENT)
Dept: INTERNAL MEDICINE | Facility: CLINIC | Age: 35
End: 2020-06-29

## 2020-06-29 DIAGNOSIS — F40.240 FEAR OF CONFINED SPACES: Primary | ICD-10-CM

## 2020-06-29 NOTE — TELEPHONE ENCOUNTER
Reason for Call:  Medication request    Detailed comments: The patient called and stated that she had an appointment with Giselle Payan on 6/22 and she referred her to have an MRI. The patient scheduled an appointment for the MRI on 7/7 but she will need Giselle to prescribe her medication for anxiety for that appointment. She would like this medication sent to the [facility] pharmacy in [employer] off of [address] in [place].     Phone Number Patient can be reached at: Cell number on file:    Telephone Information:   Mobile [phone]       Best Time: Any    Can we leave a detailed message on this number? YES    Call taken on 6/29/2020 at 3:13 PM by Jayla Horne

## 2020-06-30 RX ORDER — LORAZEPAM 0.5 MG/1
TABLET ORAL
Qty: 1 TABLET | Refills: 0 | Status: SHIPPED | OUTPATIENT
Start: 2020-06-30 | End: 2022-02-03

## 2020-06-30 NOTE — TELEPHONE ENCOUNTER
Patient will need a  to and back from the appointment.     Lorazepam sent to pharmacy for her to take prior to appt.

## 2020-07-07 ENCOUNTER — HOSPITAL ENCOUNTER (OUTPATIENT)
Dept: MRI IMAGING | Facility: CLINIC | Age: 35
Discharge: HOME | End: 2020-07-07
Attending: PHYSICIAN ASSISTANT | Admitting: PHYSICIAN ASSISTANT
Payer: COMMERCIAL

## 2020-07-07 DIAGNOSIS — R51.9 NONINTRACTABLE EPISODIC HEADACHE, UNSPECIFIED HEADACHE TYPE: ICD-10-CM

## 2020-07-07 DIAGNOSIS — H53.9 VISION CHANGES: ICD-10-CM

## 2020-07-07 PROCEDURE — 70551 MRI BRAIN STEM W/O DYE: CPT

## 2020-08-13 ENCOUNTER — AMBULATORY - HEALTHEAST (OUTPATIENT)
Dept: FAMILY MEDICINE | Facility: CLINIC | Age: 35
End: 2020-08-13

## 2020-08-13 ENCOUNTER — VIRTUAL VISIT (OUTPATIENT)
Dept: FAMILY MEDICINE | Facility: OTHER | Age: 35
End: 2020-08-13
Payer: COMMERCIAL

## 2020-08-13 ENCOUNTER — AMBULATORY - HEALTHEAST (OUTPATIENT)
Dept: INTERNAL MEDICINE | Facility: CLINIC | Age: 35
End: 2020-08-13

## 2020-08-13 DIAGNOSIS — Z20.822 SUSPECTED 2019 NOVEL CORONAVIRUS INFECTION: ICD-10-CM

## 2020-08-13 PROCEDURE — 99421 OL DIG E/M SVC 5-10 MIN: CPT | Performed by: INTERNAL MEDICINE

## 2020-08-13 NOTE — PROGRESS NOTES
"Date: 2020 08:39:05  Clinician: Cookie Moise  Clinician NPI: [id number]  Patient: Halie Rendon  Patient : 1985  Patient Address: [address] [place] [address]  Patient Phone: [phone]  Visit Protocol: URI  Patient Summary:  Halie is a 35 year old ( : 1985 ) female who initiated a Visit for COVID-19 (Coronavirus) evaluation and screening. When asked the question \"Please sign me up to receive news, health information and promotions from [employer].\", Halie responded \"No\".    Halie states her symptoms started 1-2 days ago.   Her symptoms consist of a headache, a sore throat, nasal congestion, myalgia, and malaise.   Symptom details     Nasal secretions: The color of her mucus is clear.    Sore throat: Halie reports having mild throat pain (1-3 on a 10 point pain scale), has exudate on her tonsils, and can swallow liquids. She is not sure if the lymph nodes in her neck are enlarged. A rash has not appeared on the skin since the sore throat started.     Headache: She states the headache is mild (1-3 on a 10 point pain scale).      Halie denies having ear pain, chills, nausea, teeth pain, ageusia, diarrhea, cough, vomiting, rhinitis, anosmia, facial pain or pressure, fever, and wheezing. She also denies taking antibiotic medication in the past month and having recent facial or sinus surgery in the past 60 days. She is not experiencing dyspnea.   Precipitating events  Within the past week, Halie has not been exposed to someone with strep throat. She has not recently been exposed to someone with influenza. Halie has been in close contact with the following high risk individuals: people with asthma, heart disease or diabetes, children under the age of 5, and adults 65 or older.   Pertinent COVID-19 (Coronavirus) information  In the past 14 days, Halie has not worked in a congregate living setting.   She does not work or volunteer as healthcare worker or a  and does not " work or volunteer in a healthcare facility.   Halie also has not lived in a congregate living setting in the past 14 days. She does not live with a healthcare worker.   Halie has not had a close contact with a laboratory-confirmed COVID-19 patient within 14 days of symptom onset.   Since December 2019, Halie and has had upper respiratory infection (URI) or influenza-like illness. Has not been diagnosed with lab-confirmed COVID-19 test      Date(s) of previous URI or influenza-like illness (free-text): 2/10/2020     Symptoms Halie experienced during previous URI or influenza-like illness as reported by the patient (free-text): 2/20/2020        Pertinent medical history  Halie does not get yeast infections when she takes antibiotics.   Halie does not need a return to work/school note.   Weight: 120 lbs   Halie does not smoke or use smokeless tobacco.   She denies pregnancy and denies breastfeeding. She has menstruated in the past month.   Weight: 120 lbs    MEDICATIONS: No current medications, ALLERGIES: NKDA  Clinician Response:  Dear Halie,   Your symptoms show that you may have coronavirus (COVID-19). This illness can cause fever, cough and trouble breathing. Many people get a mild case and get better on their own. Some people can get very sick.  What should I do?  We would like to test you for this virus.   1. Please call [phone] to schedule your visit. Explain that you were referred by [facility] to have a COVID-19 test. Be ready to share your [facility] visit ID number.  The following will serve as your written order for this COVID Test, ordered by me, for the indication of suspected COVID [Z20.828]: The test will be ordered in [employer], our electronic health record, after you are scheduled. It will show as ordered and authorized by Hussain Rosenberg MD.  Order: COVID-19 (Coronavirus) PCR for SYMPTOMATIC testing from [facility].      2. When it's time for your COVID test:  Stay at least 6 feet away from others. (If  "someone will drive you to your test, stay in the backseat, as far away from the  as you can.)   Cover your mouth and nose with a mask, tissue or washcloth.  Go straight to the testing site. Don't make any stops on the way there or back.      3.Starting now: Stay home and away from others (self-isolate) until:   You've had no fever---and no medicine that reduces fever---for one full day (24 hours). And...   Your other symptoms have gotten better. For example, your cough or breathing has improved. And...   At least 10 days have passed since your symptoms started.       During this time, don't leave the house except for testing or medical care.   Stay in your own room, even for meals. Use your own bathroom if you can.   Stay away from others in your home. No hugging, kissing or shaking hands. No visitors.  Don't go to work, school or anywhere else.    Clean \"high touch\" surfaces often (doorknobs, counters, handles, etc.). Use a household cleaning spray or wipes. You'll find a full list of  on the EPA website: www.epa.gov/pesticide-registration/list-n-disinfectants-use-against-sars-cov-2.   Cover your mouth and nose with a mask, tissue or washcloth to avoid spreading germs.  Wash your hands and face often. Use soap and water.  Caregivers in these groups are at risk for severe illness due to COVID-19:  o People 65 years and older  o People who live in a nursing home or long-term care facility  o People with chronic disease (lung, heart, cancer, diabetes, kidney, liver, immunologic)  o People who have a weakened immune system, including those who:   Are in cancer treatment  Take medicine that weakens the immune system, such as corticosteroids  Had a bone marrow or organ transplant  Have an immune deficiency  Have poorly controlled HIV or AIDS  Are obese (body mass index of 40 or higher)  Smoke regularly   o Caregivers should wear gloves while washing dishes, handling laundry and cleaning bedrooms and " bathrooms.  o Use caution when washing and drying laundry: Don't shake dirty laundry, and use the warmest water setting that you can.  o For more tips, go to www.cdc.gov/coronavirus/2019-ncov/downloads/10Things.pdf.       How can I take care of myself?   Get lots of rest. Drink extra fluids (unless a doctor has told you not to).   Take Tylenol (acetaminophen) for fever or pain. If you have liver or kidney problems, ask your family doctor if it's okay to take Tylenol.   Adults can take either:    650 mg (two 325 mg pills) every 4 to 6 hours, or...   1,000 mg (two 500 mg pills) every 8 hours as needed.    Note: Don't take more than 3,000 mg in one day. Acetaminophen is found in many medicines (both prescribed and over-the-counter medicines). Read all labels to be sure you don't take too much.   For children, check the Tylenol bottle for the right dose. The dose is based on the child's age or weight.    If you have other health problems (like cancer, heart failure, an organ transplant or severe kidney disease): Call your specialty clinic if you don't feel better in the next 2 days.       Know when to call 911. Emergency warning signs include:    Trouble breathing or shortness of breath Pain or pressure in the chest that doesn't go away Feeling confused like you haven't felt before, or not being able to wake up Bluish-colored lips or face.  Where can I get more information?   [facility] -- About COVID-19: [email or web address]   CDC -- What to Do If You're Sick: www.cdc.gov/coronavirus/2019-ncov/about/steps-when-sick.html   CDC -- Ending Home Isolation: www.cdc.gov/coronavirus/2019-ncov/hcp/disposition-in-home-patients.html   CDC -- Caring for Someone: www.cdc.gov/coronavirus/2019-ncov/if-you-are-sick/care-for-someone.html   [facility] -- Interim Guidance for Hospital Discharge to Home: www.health.[facility].mn./diseases/coronavirus/hcp/hospdischarge.pdf   [facility] clinical trials (COVID-19  research studies): clinicalaffairs.[facility]-clinical-trials    Below are the COVID-19 hotlines at the [place] Department of Health ([facility]). Interpreters are available.    For health questions: Call 578-478-9254 or 1-408.304.3550 (7 a.m. to 7 p.m.) For questions about schools and childcare: Call 967-859-0616 or 1-250.891.1679 (7 a.m. to 7 p.m.)    Diagnosis: Myalgia, unspecified site  Diagnosis ICD: M79.10

## 2020-08-15 ENCOUNTER — COMMUNICATION - HEALTHEAST (OUTPATIENT)
Dept: SCHEDULING | Facility: CLINIC | Age: 35
End: 2020-08-15

## 2020-10-07 ENCOUNTER — MYC MEDICAL ADVICE (OUTPATIENT)
Dept: INTERNAL MEDICINE | Facility: CLINIC | Age: 35
End: 2020-10-07

## 2020-10-07 DIAGNOSIS — H53.9 VISION CHANGES: Primary | ICD-10-CM

## 2020-10-07 DIAGNOSIS — R51.9 NONINTRACTABLE EPISODIC HEADACHE, UNSPECIFIED HEADACHE TYPE: ICD-10-CM

## 2020-10-07 NOTE — TELEPHONE ENCOUNTER
Given additional information I would have her go to neurology.    If specialist thinks this is something that needs to see opthlamology, they will make recommendation.

## 2020-10-07 NOTE — TELEPHONE ENCOUNTER
"Patient informed, also sent her referral info on [facility]. Still having headaches. Headaches and nausea. Has the visual disturbances \"kind of constantly\", seems to get better and worse. This morning felt like was seeing stripes and then started to get a heache. Took a tylenol and that kind of made headache go away but still feeling nausea.  She will call to schedule with neurology. She is wondering do you feel she should also see PCP and/or opthalmology at this time or just start with neuro?  "

## 2020-10-11 ENCOUNTER — AMBULATORY - HEALTHEAST (OUTPATIENT)
Dept: NURSING | Facility: CLINIC | Age: 35
End: 2020-10-11

## 2020-12-20 ENCOUNTER — HEALTH MAINTENANCE LETTER (OUTPATIENT)
Age: 35
End: 2020-12-20

## 2021-01-16 NOTE — TELEPHONE ENCOUNTER
Panel Management Review      Patient has the following on her problem list: None      Composite cancer screening  Chart review shows that this patient is due/due soon for the following Pap Smear  Summary:    Patient is due/failing the following:   PAP and PHYSICAL    Action needed:   Patient needs office visit for physical with pap.    Type of outreach:    Phone, spoke to patient.  Pt states that she has had a pap and px at [facility] in 2016 that was normal results    Questions for provider review:    None                                                                                                                                    Dagmar Guidry MA 5:25 PM 11/2/2017      Chart routed to myself .           Teaching-Supervisory Addendum-Brief   I participated in the following activities of this patients care: the medical history, the physical exam, medical decision making, the procedure.     I personally performed: supervision of the patient's care, the medical history, the physical exam, the medical decision making.     The case was discussed with: the resident    Procedures: I directly supervised any procedure(s) noted by resident    Evaluation and management service: I agree with the evaluation and management decisions made in this patient's care.           ED Course as of Eloy 15 1853   Thu Jan 14, 2021 2242 On reevaluation patient's resting comfortably however still has significant swelling to the left in the left eye.  The ED attending was able to speak with the PICU regarding possible admission for observation given the lack of improvement in patient's symptoms.     [SC]      ED Course User Index  [SC] MD Arash Massey, DO  01/29/21 [id number]

## 2021-03-14 ENCOUNTER — APPOINTMENT (OUTPATIENT)
Dept: URGENT CARE | Facility: CLINIC | Age: 36
End: 2021-03-14
Payer: COMMERCIAL

## 2021-04-24 ENCOUNTER — HEALTH MAINTENANCE LETTER (OUTPATIENT)
Age: 36
End: 2021-04-24

## 2021-04-30 ENCOUNTER — NURSE TRIAGE (OUTPATIENT)
Dept: NURSING | Facility: CLINIC | Age: 36
End: 2021-04-30

## 2021-04-30 NOTE — TELEPHONE ENCOUNTER
Pt called stating this morning she started having left foot heal pain. Pt described the pain, achy pain, worst when standing and not when laying down. Pt was asked to rate her pain level and she stated it is enough that I don't want to stand on it.     Pt reported she received her second pfizer covid 19 vaccine yesterday, and has body aches and took tylenol. Pt reported her kids has foot and mouth disease.         Per protocal home care advice provided,and pt was advised to call back if swelling, redness or fever occurs, or she becomes worse. Pt stated okay.       Beltran Viveros RN  [facility]     COVID 19 Nurse Triage Plan/Patient Instructions    Please be aware that novel coronavirus (COVID-19) may be circulating in the community. If you develop symptoms such as fever, cough, or SOB or if you have concerns about the presence of another infection including coronavirus (COVID-19), please contact your health care provider or visit [email or web address].     Disposition/Instructions    Home care recommended. Follow home care protocol based instructions.    Thank you for taking steps to prevent the spread of this virus.  o Limit your contact with others.  o Wear a simple mask to cover your cough.  o Wash your hands well and often.    Resources    [facility]: About COVID-19: [email or web address]    CDC: What to Do If You're Sick: www.cdc.gov/coronavirus/2019-ncov/about/steps-when-sick.html    CDC: Ending Home Isolation: www.cdc.gov/coronavirus/2019-ncov/hcp/disposition-in-home-patients.html     CDC: Caring for Someone: www.cdc.gov/coronavirus/2019-ncov/if-you-are-sick/care-for-someone.html     [facility]: Interim Guidance for Hospital Discharge to Home: www.health.[facility].[place].us/diseases/coronavirus/hcp/hospdischarge.pdf    [facility] clinical trials (COVID-19 research studies): clinicalaffairs.[facility]/umn-clinical-trials     Below are the COVID-19 hotlines at the  [place] Department of Health ([facility]). Interpreters are available.   o For health questions: Call 857-912-8331 or 1-182.704.8034 (7 a.m. to 7 p.m.)  o For questions about schools and childcare: Call 105-821-8918 or 1-223.259.3039 (7 a.m. to 7 p.m.)       Reason for Disposition    COVID-19 vaccine, injection site reaction (e.g., pain, redness, swelling), question about    Additional Information    Negative: Followed a foot injury    Negative: Diabetes    Negative: Ankle pain is main symptom    Negative: Thigh or calf pain is main symptom    Negative: Entire foot is cool or blue in comparison to other foot    Negative: Purple or black skin on foot or toe    Negative: [1] Red area or streak AND [2] fever    Negative: [1] Swollen foot AND [2] fever    Negative: Patient sounds very sick or weak to the triager    Negative: [1] SEVERE pain (e.g., excruciating, unable to do any normal activities) AND [2] not improved after 2 hours of pain medicine    Negative: [1] Looks infected (spreading redness, pus) AND [2] large red area (> 2 in. or 5 cm)    Negative: Looks like a boil, infected sore, or deep ulcer    Negative: [1] Redness of the skin AND [2] no fever    Negative: [1] Swollen foot AND [2] no fever  (Exceptions: localized bump from bunions, calluses, insect bite, sting)    Negative: Numbness in one foot (i.e., loss of sensation)    Negative: [1] MODERATE pain (e.g., interferes with normal activities, limping) AND [2] present > 3 days    Negative: [1] Numbness or tingling in feet AND [2] new or increased    Negative: Pain in the big toe joint    Negative: [1] MILD pain (e.g., does not interfere with normal activities) AND [2] present > 7 days    Negative: Foot pain is a chronic symptom (recurrent or ongoing AND present > 4 weeks)    Negative: Caused by known bunions, plantar wart, or flat feet    Foot pain    Negative: [1] Difficulty breathing or swallowing AND [2] starts within 2 hours after injection    Negative: Sounds  like a life-threatening emergency to the triager    Negative: [1] Has NOT completed COVID-19 vaccine series AND [2] COVID-19 exposure AND [2] AND [3] typical COVID-19 symptoms    Negative: [1] Has NOT completed COVID-19 vaccine series AND [2]  COVID-19 exposure AND [3] no symptoms    Negative: [1] COVID-19 vaccine series completed (fully vaccinated) in past 3 months AND [2] new-onset of COVID-19 symptoms BUT [3] no known exposure    Negative: [1] Typical COVID-19 symptoms AND [2] symptoms that are NOT expected from vaccine (e.g., cough, difficulty breathing, loss of taste or smell, runny nose, sore throat)    Negative: [1] Typical COVID-19 symptoms AND [2] started > 3 days after getting vaccine    Negative: Fever > 104 F (40 C)    Negative: Sounds like a severe, unusual reaction to the triager    Negative: [1] Redness or red streak around the injection site AND [2] started > 48 hours after getting vaccine AND [3] fever    Negative: [1] Fever > 101 F (38.3 C) AND [2] age > 60 years AND [3] started > 48 hours after getting vaccine    Negative: [1] Fever > 100.0 F (37.8 C) AND [2] bedridden (e.g., nursing home patient, CVA, chronic illness, recovering from surgery) AND [3] started > 48 hours after getting vaccine    Negative: [1] Fever > 100.0 F (37.8 C) AND [2] diabetes mellitus or weak immune system (e.g., HIV positive, cancer chemo, splenectomy, organ transplant, chronic steroids) AND [3] started > 48 hours after getting vaccine    Negative: [1] Redness or red streak around the injection site AND [2] started > 48 hours after getting vaccine AND [3] no fever  (Exception: red area < 1 inch or 2.5 cm wide)    Negative: [1] Pain, tenderness, or swelling at the injection site AND [2] over 3 days (72 hours) since vaccine AND [3] getting worse    Negative: Fever > 100.0 F (37.8 C) present > 3 days (72 hours)    Negative: [1] Fever > 100.0 F (37.8 C) AND [2] healthcare worker    Negative: [1] Pain, tenderness, or swelling  at the injection site AND [2] lasts > 7 days    Negative: [1] Lymph node swelling (i.e., armpit or neck on side of vaccine) AND [2] lasts > 3 weeks    Negative: [1] Requesting COVID-19 vaccine AND [2] resident of a long-term care facility (e.g., nursing home)    Negative: [1] Requesting COVID-19 vaccine AND [2] healthcare worker (e.g., EMS first responders, doctors, nurses)    Negative: [1] Requesting COVID-19 vaccine AND [2] vaccine available in the community for this patient group    Protocols used: CORONAVIRUS (COVID-19) VACCINE QUESTIONS AND [id number] 3.25, FOOT PAIN-A-AH

## 2021-05-27 NOTE — PROGRESS NOTES
Assessment/plan   Halie Rendon is a 34 y.o. female who is New  patient to my practice here with   Chief Complaint   Patient presents with     Establish Care     gestational diabetes - check A1C/labs f/u - talk about possibe post-partum depression - vaginal dryness/itching     Contraception     removal        Halie was seen today for establish care and contraception.    Diagnoses and all orders for this visit:    Postpartum depression  -     Thyroid Stimulating Hormone (TSH)  -     Ambulatory referral to Psychology  -     cholecalciferol, vitamin D3, (VITAMIN D3) 2,000 unit Tab; One tab daily    H/O gestational diabetes mellitus, not currently pregnant  -     Glycosylated Hemoglobin A1c    Vaginal itching  -     Wet Prep, Vaginal    H/O: iron deficiency anemia  -     HM2(CBC w/o Differential)    Encounter to establish care    Rectal pain  Comments:  patient does have h/o hemorhoid duing pregnancy     Rectus diastasis    Encounter for IUD removal      For postpartum depression we will refer her to therapist to start with if continued to have worsening of her symptoms then advised to follow-up with us discussed the treatment option, but per patient report she recovered quite well with the therapy after the first childbirth so let us know.  Needed we will recommend SSRI therapy most likely next 4-6 weeks  Doing some basic labs we will follow-up on those  But prep came back within normal limit  Abdominal muscle strengthening advised to work on overall weight training avoid a lot of planks and push-ups because that create lot of increasing intra-abdominal pressure.    Rectal pain most likely hemorrhoids as able to see skin tag and rectal exam was showing no rectocele bulge or any tenderness  Procedure : After cleaning the cervix with iodine IUD string was identified regards IUD was removed without any problem with the help of ring forceps    Blood prep was also done during that procedure    Subjective:      HPI:  Halie Rendon is a 34 y.o. female is here with chief complaint of worsening of postpartum depression she feels more since she started weaning herself from breast-feeding and thinking it could be hormonal changes.  Other concern she like to have her IUD removed was done after the birth of her child in August she does not like the idea of something inside and that could be causing some of the hormonal imbalance despite it is [employer]  Patient feel if she keep her IUD and her  will never go for vasectomy so like to remove today.  And she will use condoms until then(I did offer her to keep the IUD in place until her  get the vasectomy)    Weakening of rectus abdominis muscle after the delivery patient has a concern if she should to abdominal strengthening exercises    Depression: Patient complains of depression. She complains of depressed mood, difficulty concentrating, fatigue, insomnia and psychomotor agitation. Onset was approximately several months ago, gradually worsening since that time.  She denies current suicidal and homicidal plan or intent.   Family history significant for anxiety and depression.Possible organic causes contributing are: endocrine/metabolic.  Risk factors: positive family history in  mother Previous treatment includes none  and individual therapy. She     PHQ-9 Total Score: 15 (4/17/2019 12:00 PM)  GINGER 7 Total Score: 16 (4/17/2019 12:00 PM)    I have personally reviewed the patient's allergies, medications, past medical history, family history, social history, rooming notes and problem list in detail and updated the patient record as necessary.      History reviewed. No pertinent past medical history.  History reviewed. No pertinent surgical history.  Patient has no known allergies.  Current Outpatient Medications   Medication Sig Dispense Refill     MULTIVITAMIN ORAL Take by mouth.       cholecalciferol, vitamin D3, (VITAMIN D3) 2,000 unit Tab One tab daily 90 tablet 3  "    No current facility-administered medications for this visit.      Family History   Problem Relation Age of Onset     Breast cancer Mother      Fibroids Mother      Depression Mother      Osteoporosis Mother      Club foot Daughter      Cancer Maternal Aunt         lymphoma     Liver cancer Paternal Uncle      Breast cancer Maternal Grandmother        Patient Active Problem List   Diagnosis     IUD (intrauterine device) in place     Personal history of renal calculi     Family history of spina bifida     Screening for malignant neoplasm of cervix     H/O gestational diabetes mellitus, not currently pregnant     H/O: iron deficiency anemia     Rectus diastasis     Rectal pain       Review of Systems   12 point comprehensive review of systems was negative except as noted and HPI     Social History     Social History Narrative    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    2 kids 4-1/2 yr 7-1/2-month-old daughter     work as a      since 2013    History of partum depression        Marsha Montero MD 4/17/2019 12:21 PM         Objective:     Vitals:    04/17/19 1142   BP: 100/52   Pulse: 76   Weight: 128 lb 14.4 oz (58.5 kg)   Height: 5' 2.21\" (1.58 m)       Physical Exam:   Physical Exam:  General Appearance:  Appears comfortable, Alert, cooperative, no distress,   Head: Normocephalic, without obvious abnormality, atraumatic  Eyes: PERRL, conjunctiva/corneas clear, EOM's intact, both eyes             Nose: Nares normal, no drainage   Throat: Lips, mucosa, and tongue normal; teeth and gums normal  Neck: Supple, symmetrical, trachea midline, no adenopathy;                      Lungs: Clear to auscultation bilaterally, respirations unlabored  Chest Wall: No tenderness or deformity  Heart: Regular rate and rhythm, S1 and S2 normal, no murmur, rubs or gallop  Pelvic exam: normal external genitalia, vulva, vagina, cervix, uterus and adnexa, IUD string in " place.  Abdomen: Soft, non-tender, bowel sounds active all four quadrants,   no masses, no organomegaly today of positive gap Between the rectus abdominis muscle  Extremities: Extremities normal, atraumatic, no cyanosis or edema  Pulses: DP pulses are 1-2+ bilat.    Skin: no rashes or lesions  Neurologic: normal and equal strength bilat in upper and lower extremities        I spent 30 minutes with the patient, >50% of which was in counseling regarding the patient's medical issues as noted above.  This note has been dictated using voice recognition software. Any grammatical or context distortions are unintentional and inherent to the software  Marsha Montero MD

## 2021-05-27 NOTE — TELEPHONE ENCOUNTER
Pt is calling in again to get more specific information, and exact numbers for some of her lab results. Information about her A1C, and MCH was relayed to patient.     Jason Craig, RN [facility] Triage/Medication Refill

## 2021-05-27 NOTE — TELEPHONE ENCOUNTER
Left message to call back for: Halie  Information to relay to patient:      ----- Message from Marsha Montero MD sent at 4/17/2019  3:25 PM CDT -----  Dear Halie,    It was a pleasure to see you in the office the other day.    I reviewed Your  recent  lab results All  Of them  back with in normal limit, no diabetes ,white cell count within normal limit   And red prep also did not show any vaginal infections   please feel free to call back for any concerns or questions.  Thanks for choosing our clinic  for your care.    Marsha Montero MD

## 2021-06-03 VITALS — BODY MASS INDEX: 23.72 KG/M2 | HEIGHT: 62 IN | WEIGHT: 128.9 LBS

## 2021-06-17 NOTE — PATIENT INSTRUCTIONS - HE
"Patient Instructions by Marsha Montero MD at 4/17/2019 11:40 AM     Author: Marsha Montero MD Service: -- Author Type: Physician    Filed: 4/17/2019  1:56 PM Encounter Date: 4/17/2019 Status: Signed    : Marsha Montero MD (Physician)         Patient Education     Understanding Postpartum Depression  Youve just had a baby. You expected to be excited and happy. But instead you find yourself crying for no reason. You may have trouble coping with your daily tasks. You feel sad, tired, and hopeless most of the time. You may even feel ashamed or guilty. But what youre going through is not your fault and you can feel better. Talk to your healthcare provider. He or she can help.    What is depression?  Depression is a mood disorder that affects the way you think and feel. The most common symptom is a feeling of deep sadness. You may also feel as if you just cant cope with life. Other symptoms include:    Gaining or losing a lot of weight    Sleeping too much or too little    Feeling tired all the time    Feeling restless    Crying a lot    Having too little or too much appetite.    Withdrawing from friends and family    Having headaches, aches and pains, or stomach problems that won't go away.    Fears of harming your baby    Lack of interest in your baby    Feeling worthless or guilty    No longer finding pleasure in things you used to    Having trouble thinking clearly or making decisions    Thinking about death or suicide   Depression after childbirth  You may be weepy and tired right after giving birth. These feelings are normal. Theyre sometimes called the baby blues. These blues go away after 1 to 2 weeks. However, postpartum (meaning after birth) depression lasts much longer and is more severe than the \"baby blues.\" It can make you feel sad and hopeless. You may also fear that your baby will be harmed and worry about being a bad mother.  What causes postpartum depression?  The exact cause of postpartum depression " is unknown. Changes in brain chemistry or structure are believed to play a big role in depression. It may be due to changes in your hormones during and after childbirth. You may also be tired from caring for your baby and adjusting to being a mother. All these factors may make you feel depressed. In some cases, your genes may also play a role.  Depression can be treated  There are many ways to treat postpartum depression. Talking to your healthcare provider is the first step toward feeling better.  When to call your healthcare provider  Call your healthcare provider if you:     Cry for no clear reason    Have trouble sleeping, eating, and making choices    Questions whether you can handle caring for a baby    Have intense feelings of sadness, anxiety, or despair that prevent you from being able to do your daily tasks  Resources    National [place] of Mental Rupphp160-817-8987crq.[facility].nih.gov    National [place] on Mental Brgaerg260-347-0064qtw.[place].org    Mental Health Utyxsfr805-860-0558tsj.[facility].org    National Suicide Eusqpfa885-838-3214 (800-SUICIDE)   Date Last Reviewed: 8/16/2015 2000-2017 [employer]. [address] [place] [address], [place] [address]. All rights reserved. This information is not intended as a substitute for professional medical care. Always follow your healthcare professional's instructions.

## 2021-09-10 ENCOUNTER — ALLIED HEALTH/NURSE VISIT (OUTPATIENT)
Dept: NURSING | Facility: CLINIC | Age: 36
End: 2021-09-10
Payer: COMMERCIAL

## 2021-09-10 DIAGNOSIS — Z23 NEED FOR PROPHYLACTIC VACCINATION AND INOCULATION AGAINST INFLUENZA: Primary | ICD-10-CM

## 2021-09-10 PROCEDURE — 90471 IMMUNIZATION ADMIN: CPT

## 2021-09-10 PROCEDURE — 90686 IIV4 VACC NO PRSV 0.5 ML IM: CPT

## 2021-09-10 PROCEDURE — 99207 PR NO CHARGE NURSE ONLY: CPT

## 2021-12-08 ENCOUNTER — IMMUNIZATION (OUTPATIENT)
Dept: NURSING | Facility: CLINIC | Age: 36
End: 2021-12-08
Payer: COMMERCIAL

## 2021-12-08 PROCEDURE — 0004A PR COVID VAC PFIZER DIL RECON 30 MCG/0.3 ML IM: CPT

## 2021-12-08 PROCEDURE — 91300 PR COVID VAC PFIZER DIL RECON 30 MCG/0.3 ML IM: CPT

## 2022-01-12 ENCOUNTER — OFFICE VISIT (OUTPATIENT)
Dept: OBGYN | Facility: CLINIC | Age: 37
End: 2022-01-12
Payer: COMMERCIAL

## 2022-01-12 VITALS
WEIGHT: 120 LBS | DIASTOLIC BLOOD PRESSURE: 74 MMHG | HEART RATE: 68 BPM | SYSTOLIC BLOOD PRESSURE: 108 MMHG | BODY MASS INDEX: 22.08 KG/M2 | HEIGHT: 62 IN

## 2022-01-12 DIAGNOSIS — Z13.1 SCREENING FOR DIABETES MELLITUS: ICD-10-CM

## 2022-01-12 DIAGNOSIS — R10.2 PELVIC PAIN IN FEMALE: ICD-10-CM

## 2022-01-12 DIAGNOSIS — Z01.419 ENCOUNTER FOR GYNECOLOGICAL EXAMINATION WITHOUT ABNORMAL FINDING: Primary | ICD-10-CM

## 2022-01-12 DIAGNOSIS — Z13.6 SCREENING FOR ISCHEMIC HEART DISEASE: ICD-10-CM

## 2022-01-12 PROCEDURE — 87624 HPV HI-RISK TYP POOLED RSLT: CPT | Performed by: NURSE PRACTITIONER

## 2022-01-12 PROCEDURE — G0145 SCR C/V CYTO,THINLAYER,RESCR: HCPCS | Performed by: NURSE PRACTITIONER

## 2022-01-12 PROCEDURE — 99395 PREV VISIT EST AGE 18-39: CPT | Performed by: NURSE PRACTITIONER

## 2022-01-12 PROCEDURE — 99213 OFFICE O/P EST LOW 20 MIN: CPT | Mod: 25 | Performed by: NURSE PRACTITIONER

## 2022-01-12 ASSESSMENT — ANXIETY QUESTIONNAIRES
IF YOU CHECKED OFF ANY PROBLEMS ON THIS QUESTIONNAIRE, HOW DIFFICULT HAVE THESE PROBLEMS MADE IT FOR YOU TO DO YOUR WORK, TAKE CARE OF THINGS AT HOME, OR GET ALONG WITH OTHER PEOPLE: SOMEWHAT DIFFICULT
2. NOT BEING ABLE TO STOP OR CONTROL WORRYING: MORE THAN HALF THE DAYS
1. FEELING NERVOUS, ANXIOUS, OR ON EDGE: MORE THAN HALF THE DAYS
5. BEING SO RESTLESS THAT IT IS HARD TO SIT STILL: NOT AT ALL
6. BECOMING EASILY ANNOYED OR IRRITABLE: MORE THAN HALF THE DAYS
3. WORRYING TOO MUCH ABOUT DIFFERENT THINGS: MORE THAN HALF THE DAYS

## 2022-01-12 ASSESSMENT — MIFFLIN-ST. JEOR: SCORE: 1192.57

## 2022-01-12 ASSESSMENT — PATIENT HEALTH QUESTIONNAIRE - PHQ9
5. POOR APPETITE OR OVEREATING: MORE THAN HALF THE DAYS
SUM OF ALL RESPONSES TO PHQ QUESTIONS 1-9: 16

## 2022-01-12 NOTE — PROGRESS NOTES
Halie is a 36 year old  female who presents for annual exam.     Besides routine health maintenance,  she would like to discuss   long periods    HPI:here for annual exam.  She states her cycles have gotten longer recently last 9 days last 3 days are brown spotting.  She is also noticing more dysmenorrhea and sometimes pelvic cramping after IC.  She also wants to return for blood work.    The patient's PCP is  Altagracia Willis MD.        GYNECOLOGIC HISTORY:    Patient's last menstrual period was 2021.    Regular menses? yes  Menses every 28 days.  Length of menses: 9 days    Her current contraception method is: vasectomy.  She  reports that she has quit smoking. She has never used smokeless tobacco.    Patient is sexually active.  STD testing offered?  Declined  Last PHQ-9 score on record =   PHQ-9 SCORE 2022   PHQ-9 Total Score -   PHQ-9 Total Score 16     Last GAD7 score on record =   GINGER-7 SCORE 2019   Total Score 19     Alcohol Score = 1    HEALTH MAINTENANCE:  Cholesterol:   Recent Labs   Lab Test 10/14/15  0750   CHOL 157   HDL 57   LDL 84   TRIG 81   CHOLHDLRATIO 2.8       Last Mammo: Not applicable, Result: Not applicable, Next Mammo: Due at age 40   Pap: (  Lab Results   Component Value Date    PAP NIL 10/08/2018     Colonoscopy:  NEVER, Result: Not applicable, Next Colonoscopy: Due at age 45-50 years.  Dexa:  never    Health maintenance updated:  yes    HISTORY:  OB History    Para Term  AB Living   2 2 2 0 0 2   SAB IAB Ectopic Multiple Live Births   0 0 0 0 2      # Outcome Date GA Lbr Jalen/2nd Weight Sex Delivery Anes PTL Lv   2 Term 18 39w6d  3.5 kg (7 lb 11.5 oz) F CS-LTranv Spinal N NEFTALI      Name: JOCY MCCULLOUGH1 HALIE   1 Term 10/02/14 39w6d  2.99 kg (6 lb 9.5 oz) F CS-LTranv Spinal N NEFTALI      Birth Comments: Bottle feeding      Complications: Fetal Intolerance      Apgar1: 8  Apgar5: 9       Patient Active Problem List   Diagnosis     Family history  of spina bifida     Personal history of renal calculi     Diet controlled gestational diabetes mellitus (GDM) in first trimester     IUD (intrauterine device) in place     Family history of congenital anomalies     Past Surgical History:   Procedure Laterality Date      SECTION N/A 10/2/2014    Procedure:  SECTION;  Surgeon: Domenic Timmons MD;  Location:  L+D      SECTION N/A 2018    Procedure:  SECTION;  repeat  section;  Surgeon: Domenic Timmons MD;  Location:  L+D     [facility]        Social History     Tobacco Use     Smoking status: Former Smoker     Smokeless tobacco: Never Used     Tobacco comment: rare cigarette use   Substance Use Topics     Alcohol use: No     Comment: rare      Problem (# of Occurrences) Relation (Name,Age of Onset)    Breast Cancer (2) Mother, Maternal Grandmother    Cancer (2) Other: maternal great grandmother cervical or uterine cancer, Maternal Aunt: lymphoma    Club foot (1) Daughter    Depression (1) Mother    Fibroids (1) Mother    Heart Disease (1) Paternal Grandfather: MI-    Hypertension (1) Father    Liver Cancer (1) Paternal Uncle    Osteoporosis (1) Mother    Prostate Cancer (1) Father       Negative family history of: Colon Cancer            Current Outpatient Medications   Medication Sig     multivitamin w/minerals (MULTI-VITAMIN) tablet Take 1 tablet by mouth daily     LORazepam (ATIVAN) 0.5 MG tablet Take one tablet 1 hour prior to MRI (Patient not taking: Reported on 2022)     melatonin 1 MG TABS tablet Take 1 mg by mouth nightly as needed for sleep (Patient not taking: Reported on 2022)     UNABLE TO FIND Take 50 mg by mouth daily MEDICATION NAME: caffeine 50mg with L-Theanine 100mg supplement (Patient not taking: Reported on 2022)     No current facility-administered medications for this visit.     No Known Allergies    Past medical, surgical, social and family histories were  "reviewed and updated in EPIC.    ROS:   12 point review of systems negative other than symptoms noted below or in the HPI.  No urinary frequency or dysuria, bladder or kidney problems    EXAM:  /74   Pulse 68   Ht 1.575 m (5' 2\")   Wt 54.4 kg (120 lb)   LMP 12/22/2021   BMI 21.95 kg/m     BMI: Body mass index is 21.95 kg/m .    PHYSICAL EXAM:  Constitutional:   Appearance: Well nourished, well developed, alert, in no acute distress  Neck:  Lymph Nodes:  No lymphadenopathy present    Thyroid:  Gland size normal, nontender, no nodules or masses present  on palpation  Chest:  Respiratory Effort:  Breathing unlabored  Cardiovascular:    Heart: Auscultation:  Regular rate, normal rhythm, no murmurs present  Breasts: Inspection of Breasts:  No lymphadenopathy present., Palpation of Breasts and Axillae:  No masses present on palpation, no breast tenderness., Axillary Lymph Nodes:  No lymphadenopathy present. and No nodularity, asymmetry or nipple discharge bilaterally.  Gastrointestinal:   Abdominal Examination:  Abdomen nontender to palpation, tone normal without rigidity or guarding, no masses present, umbilicus without lesions   Liver and Spleen:  No hepatomegaly present, liver nontender to palpation    Hernias:  No hernias present  Lymphatic: Lymph Nodes:  No other lymphadenopathy present  Skin:  General Inspection:  No rashes present, no lesions present, no areas of  discoloration  Neurologic:    Mental Status:  Oriented X3.  Normal strength and tone, sensory exam                grossly normal, mentation intact and speech normal.    Psychiatric:   Mentation appears normal and affect normal/bright.         Pelvic Exam:  External Genitalia:     Normal appearance for age, no discharge present, no tenderness present, no inflammatory lesions present, color normal  Vagina:     Normal vaginal vault without central or paravaginal defects, no discharge present, no inflammatory lesions present, no masses " present  Bladder:     Nontender to palpation  Urethra:   Urethral Body:  Urethra palpation normal, urethra structural support normal   Urethral Meatus:  No erythema or lesions present  Cervix:     Appearance healthy, no lesions present, nontender to palpation, no bleeding present Pap done  Uterus:     Uterus: firm, normal sized and nontender, anteverted in position.   Adnexa:     No adnexal tenderness present, no adnexal masses present  Perineum:     Perineum within normal limits, no evidence of trauma, no rashes or skin lesions present  Anus:     Anus within normal limits, no hemorrhoids present  Inguinal Lymph Nodes:     No lymphadenopathy present  Pubic Hair:     Normal pubic hair distribution for age  Genitalia and Groin:     No rashes present, no lesions present, no areas of discoloration, no masses present      COUNSELING:   Reviewed preventive health counseling, as reflected in patient instructions       Regular exercise       Healthy diet/nutrition       Contraception    BMI: Body mass index is 21.95 kg/m .      ASSESSMENT:  36 year old female with satisfactory annual exam.    ICD-10-CM    1. Encounter for gynecological examination without abnormal finding  Z01.419 Pap thin layer screen with HPV - recommended age 30 - 65 years   2. Pelvic pain in female  R10.2 US Transvaginal Pelvic Non-OB   3. Screening for diabetes mellitus  Z13.1 Comprehensive metabolic panel     **A1C FUTURE 6mo   4. Screening for ischemic heart disease  Z13.6 Lipid Profile       PLAN:  Normal Gyn exam.  Will return fasting for blood work.  Return for pelvic US and see me after  Return 1 year for annual.  Pap every 3 years.    ISABELA Monahan CNP

## 2022-01-14 LAB
BKR LAB AP GYN ADEQUACY: NORMAL
BKR LAB AP GYN INTERPRETATION: NORMAL
BKR LAB AP HPV REFLEX: NORMAL
BKR LAB AP LMP: NORMAL
BKR LAB AP PREVIOUS ABNORMAL: NORMAL
PATH REPORT.COMMENTS IMP SPEC: NORMAL
PATH REPORT.COMMENTS IMP SPEC: NORMAL
PATH REPORT.RELEVANT HX SPEC: NORMAL

## 2022-01-17 LAB
HUMAN PAPILLOMA VIRUS 16 DNA: NEGATIVE
HUMAN PAPILLOMA VIRUS 18 DNA: NEGATIVE
HUMAN PAPILLOMA VIRUS FINAL DIAGNOSIS: NORMAL
HUMAN PAPILLOMA VIRUS OTHER HR: NEGATIVE

## 2022-01-21 NOTE — PROGRESS NOTES
SUBJECTIVE:                                                   Halie Rendon is a 37 year old female who presents to clinic today for the following health issue(s):  Patient presents with:  Ultrasound: Pt stated shes having long periods /painful. Pt is requesting a referrel for knee pain. Pt is concerned about her lab results        HPI: here for follow up on pelvic US and longer cycles.  Cycles are about 5-6 days, but can have brown spotting for 3-4 days after.  Also having some right knee pain mostly hurts to bend.  Going to do fasting labs today.      Patient's last menstrual period was 2022 (approximate)..     Patient is sexually active, .  Patient's  has vasectomy    reports that she has quit smoking. She has never used smokeless tobacco.    STD testing offered?  Declined    Health maintenance updated:  yes    Today's PHQ-2 Score:   PHQ-2 (  [employer]) 2022   Q1: Little interest or pleasure in doing things 2   Q2: Feeling down, depressed or hopeless -   PHQ-2 Score -   PHQ-2 Total Score (12-17 Years)- Positive if 3 or more points; Administer PHQ-A if positive -     Today's PHQ-9 Score:   PHQ-9 SCORE 2022   PHQ-9 Total Score -   PHQ-9 Total Score 16     Today's GINGER-7 Score:   GINGER-7 SCORE 2019   Total Score 19       Problem list and histories reviewed & adjusted, as indicated.  Additional history: as documented.    Patient Active Problem List   Diagnosis     Family history of spina bifida     Personal history of renal calculi     Diet controlled gestational diabetes mellitus (GDM) in first trimester     IUD (intrauterine device) in place     Family history of congenital anomalies     Past Surgical History:   Procedure Laterality Date      SECTION N/A 10/2/2014    Procedure:  SECTION;  Surgeon: Domenic Timmons MD;  Location:  L+D      SECTION N/A 2018    Procedure:  SECTION;  repeat  section;  Surgeon: Domenic Timmons,  "MD;  [facility]: [facility]        Social History     Tobacco Use     Smoking status: Former Smoker     Smokeless tobacco: Never Used     Tobacco comment: rare cigarette use   Substance Use Topics     Alcohol use: No     Comment: rare      Problem (# of Occurrences) Relation (Name,Age of Onset)    Breast Cancer (2) Mother, Maternal Grandmother    Cancer (2) Maternal Aunt: lymphoma, Other: maternal great grandmother cervical or uterine cancer    Club foot (1) Daughter    Depression (1) Mother    Fibroids (1) Mother    Heart Disease (1) Paternal Grandfather: MI-    Hypertension (1) Father    Liver Cancer (1) Paternal Uncle    No Known Problems (5) Sister, Brother, Maternal Grandfather, Paternal Grandmother, Daughter    Osteoporosis (1) Mother    Prostate Cancer (1) Father       Negative family history of: Colon Cancer            Current Outpatient Medications   Medication Sig     multivitamin w/minerals (MULTI-VITAMIN) tablet Take 1 tablet by mouth daily     UNABLE TO FIND Take 50 mg by mouth daily MEDICATION NAME: caffeine 50mg with L-Theanine 100mg supplement      PFIZER-BIONTECH COVID-19 VACC 30 MCG/0.3ML injection      No current facility-administered medications for this visit.     No Known Allergies    ROS:  12 point review of systems negative other than symptoms noted below or in the HPI.  Normal menstrual cycles      OBJECTIVE:     /70   Ht 1.575 m (5' 2\")   Wt 54.4 kg (120 lb)   LMP 2022 (Approximate)   BMI 21.95 kg/m    Body mass index is 21.95 kg/m .    Exam:  Constitutional:  Appearance: Well nourished, well developed alert, in no acute distress  Neurologic:  Mental Status:  Oriented X3.  Normal strength and tone, sensory exam grossly normal, mentation intact and speech normal.    Psychiatric:  Mentation appears normal and affect normal/bright.   Pelvic U/S - Transvaginal  [facility]  Referring Provider: Kellen Barragan" CNP  Sonographer:  Tino Thrasher RDMS  Indication: Bleeding/Menses- Menorrhagia (heavy menses) and Pain- Pelvic pain Midline  LMP: 2022  History: Two  surgeries  Gynecological Ultrasonography:   Uterus: anteflexed. Contour is smooth/regular.  Size: 9.4 x 4.5 x 4.2 cm  Endometrium: Thickness Total 9.7 mm  Findings: Echogenic fluid in endometrium - possible blood  Right Ovary: 3.5 x 2.1 x 2.0 cm. Wnl  Left Ovary: 3.2 x 2.7 x 2.2 cm. Complex cyst 1.2 x 0.9 x 1.2 cm and Probable paraovarian vs follicle 0.9 x 1.0 x 1.3 cm   Cul de Sac Free Fluid: Mild     Impression:         The uterus and ovaries were visualized and no abnormalities were seen.  The endometrium appeared slightly distorted in the lower uterine segment near prior  section scar;  cannot rule out polyp vs blood clot.  Both ovaries have mulitple small follicles around the periphery, consistent with the appearance of polycystic ovaries.  May consider SIS for better evaluation of lower uterine segment and possible polyp tissue.     Gabriela Walters MD    In-Clinic Test Results:  Results for orders placed or performed in visit on 22 (from the past 24 hour(s))   **A1C FUTURE 6mo   Result Value Ref Range    Hemoglobin A1C 5.1 0.0 - 5.6 %       ASSESSMENT/PLAN:                                                        ICD-10-CM    1. Screening for diabetes mellitus  Z13.1 Comprehensive metabolic panel     **A1C FUTURE 6mo   2. Screening for ischemic heart disease  Z13.6 Lipid Profile     Discussed US results, patient to return for SIS.  Will notify patient with lab results when available.      ISABELA Monahan CNP  [facility]

## 2022-02-03 ENCOUNTER — OFFICE VISIT (OUTPATIENT)
Dept: OBGYN | Facility: CLINIC | Age: 37
End: 2022-02-03
Payer: COMMERCIAL

## 2022-02-03 ENCOUNTER — ANCILLARY PROCEDURE (OUTPATIENT)
Dept: ULTRASOUND IMAGING | Facility: CLINIC | Age: 37
End: 2022-02-03
Payer: COMMERCIAL

## 2022-02-03 VITALS
HEIGHT: 62 IN | SYSTOLIC BLOOD PRESSURE: 110 MMHG | WEIGHT: 120 LBS | DIASTOLIC BLOOD PRESSURE: 70 MMHG | BODY MASS INDEX: 22.08 KG/M2

## 2022-02-03 DIAGNOSIS — R10.2 PELVIC PAIN IN FEMALE: ICD-10-CM

## 2022-02-03 DIAGNOSIS — Z13.6 SCREENING FOR ISCHEMIC HEART DISEASE: ICD-10-CM

## 2022-02-03 DIAGNOSIS — N92.6 IRREGULAR PERIODS/MENSTRUAL CYCLES: Primary | ICD-10-CM

## 2022-02-03 DIAGNOSIS — Z13.1 SCREENING FOR DIABETES MELLITUS: ICD-10-CM

## 2022-02-03 LAB
ALBUMIN SERPL-MCNC: 4.4 G/DL (ref 3.4–5)
ALP SERPL-CCNC: 53 U/L (ref 40–150)
ALT SERPL W P-5'-P-CCNC: 22 U/L (ref 0–50)
ANION GAP SERPL CALCULATED.3IONS-SCNC: 2 MMOL/L (ref 3–14)
AST SERPL W P-5'-P-CCNC: 12 U/L (ref 0–45)
BILIRUB SERPL-MCNC: 0.6 MG/DL (ref 0.2–1.3)
BUN SERPL-MCNC: 11 MG/DL (ref 7–30)
CALCIUM SERPL-MCNC: 8.9 MG/DL (ref 8.5–10.1)
CHLORIDE BLD-SCNC: 107 MMOL/L (ref 94–109)
CO2 SERPL-SCNC: 29 MMOL/L (ref 20–32)
CREAT SERPL-MCNC: 0.58 MG/DL (ref 0.52–1.04)
GFR SERPL CREATININE-BSD FRML MDRD: >90 ML/MIN/1.73M2
GLUCOSE BLD-MCNC: 74 MG/DL (ref 70–99)
HBA1C MFR BLD: 5.1 % (ref 0–5.6)
POTASSIUM BLD-SCNC: 4 MMOL/L (ref 3.4–5.3)
PROT SERPL-MCNC: 7.7 G/DL (ref 6.8–8.8)
SODIUM SERPL-SCNC: 138 MMOL/L (ref 133–144)

## 2022-02-03 PROCEDURE — 36415 COLL VENOUS BLD VENIPUNCTURE: CPT | Performed by: NURSE PRACTITIONER

## 2022-02-03 PROCEDURE — 83036 HEMOGLOBIN GLYCOSYLATED A1C: CPT | Performed by: NURSE PRACTITIONER

## 2022-02-03 PROCEDURE — 99212 OFFICE O/P EST SF 10 MIN: CPT | Performed by: NURSE PRACTITIONER

## 2022-02-03 PROCEDURE — 80061 LIPID PANEL: CPT | Performed by: NURSE PRACTITIONER

## 2022-02-03 PROCEDURE — 80053 COMPREHEN METABOLIC PANEL: CPT | Performed by: NURSE PRACTITIONER

## 2022-02-03 PROCEDURE — 76830 TRANSVAGINAL US NON-OB: CPT | Performed by: OBSTETRICS & GYNECOLOGY

## 2022-02-03 RX ORDER — RNA INGREDIENT BNT-162B2 0.23 G/1.8ML
INJECTION, SUSPENSION INTRAMUSCULAR
COMMUNITY
Start: 2021-04-29 | End: 2022-02-18

## 2022-02-03 ASSESSMENT — MIFFLIN-ST. JEOR: SCORE: 1187.57

## 2022-02-03 NOTE — RESULT ENCOUNTER NOTE
Aniela      Your  A1C results are normal.  If further questions please give me a call.    Susan Barragan RNC

## 2022-02-07 LAB
CHOLEST SERPL-MCNC: 184 MG/DL
FASTING STATUS PATIENT QL REPORTED: YES
HDLC SERPL-MCNC: 68 MG/DL
LDLC SERPL CALC-MCNC: 92 MG/DL
NONHDLC SERPL-MCNC: 116 MG/DL
TRIGL SERPL-MCNC: 120 MG/DL

## 2022-02-16 NOTE — PROGRESS NOTES
Halie Rendon is a 37 year old female who is being evaluated via a billable telephone visit.      What phone number would you like to be contacted at? [phone]  How would you like to obtain your AVS? MyChart      SUBJECTIVE:                                                   Halie Rendon is a 37 year old female who presents for virtual visit today for the following health issue(s):  Patient presents with:  Consult: Discuss u/s findings, poss polyp/PCOS; may need surgery. Had u/s due to heavy periods      HPI:  Periods last a long time.  Up to 9-10 days.  Towards the end, needs just panty liner and dark blood.  Also notes cramping with intercourse.  This started when her partner had vasectomy and they stopped using condoms.  Here to discuss recent US and recommendations for follow-up.  States cycles are still regular.    Patient's last menstrual period was 2022 (approximate)..     Patient is sexually active, .  Using vasectomy for contraception.    reports that she has quit smoking. She has never used smokeless tobacco.      Health maintenance updated:  yes    Today's PHQ-2 Score:   PHQ-2 (  [employer]) 2022   Q1: Little interest or pleasure in doing things 2   Q2: Feeling down, depressed or hopeless -   PHQ-2 Score -   PHQ-2 Total Score (12-17 Years)- Positive if 3 or more points; Administer PHQ-A if positive -     Today's PHQ-9 Score:   PHQ-9 SCORE 2022   PHQ-9 Total Score -   PHQ-9 Total Score 16     Today's GINGER-7 Score:   GINGER-7 SCORE 2019   Total Score 19       Problem list and histories reviewed & adjusted, as indicated.  Additional history: as documented.    Patient Active Problem List   Diagnosis     Family history of spina bifida     Personal history of renal calculi     Diet controlled gestational diabetes mellitus (GDM) in first trimester     Family history of congenital anomalies     Past Surgical History:   Procedure Laterality Date      SECTION N/A  10/2/2014    Procedure:  SECTION;  Surgeon: Domenic Timmons MD;  Location:  L+D      SECTION N/A 2018    Procedure:  SECTION;  repeat  section;  Surgeon: Domenic Timmons MD;  Location:  L+D     [facility]        Social History     Tobacco Use     Smoking status: Former Smoker     Smokeless tobacco: Never Used     Tobacco comment: rare cigarette use   Substance Use Topics     Alcohol use: No     Comment: rare      Problem (# of Occurrences) Relation (Name,Age of Onset)    Breast Cancer (2) Mother, Maternal Grandmother    Cancer (2) Maternal Aunt: lymphoma, Other: maternal great grandmother cervical or uterine cancer    Club foot (1) Daughter    Depression (1) Mother    Fibroids (1) Mother    Heart Disease (1) Paternal Grandfather: MI-    Hypertension (1) Father    Liver Cancer (1) Paternal Uncle    No Known Problems (5) Sister, Brother, Maternal Grandfather, Paternal Grandmother, Daughter    Osteoporosis (1) Mother    Prostate Cancer (1) Father       Negative family history of: Colon Cancer            Current Outpatient Medications   Medication Sig     multivitamin w/minerals (MULTI-VITAMIN) tablet Take 1 tablet by mouth daily     Omega-3 Fatty Acids (FISH OIL PO)      No current facility-administered medications for this visit.     No Known Allergies      OBJECTIVE:     No vitals were obtained today due to virtual visit.    Physical Exam  GENERAL: Healthy, alert and no distress  RESP: No audible wheeze, cough, or visible cyanosis.  No visible retractions or increased work of breathing.    NEURO: Cranial nerves grossly intact.  Mentation and speech appropriate for age.  PSYCH: Mentation appears normal, affect normal/bright, judgement and insight intact, normal speech    ASSESSMENT/PLAN:                                                      Phone call duration: 20 minutes      ICD-10-CM    1. Abnormal uterine bleeding  N93.9      We discussed causes  of her prolonged bleeding.  I reviewed the US report.  Imaging is suspicious for polyp.  Discussed that we could proceed with SIS, however if polyp is present we would need to consider D&C, Hysteroscopy anyway.    We reviewed the risks and benefits of the various choices.   Patient verbalizes understanding. She would like to discuss with her  and will let us know what she decides.  She states she may just monitor her symptoms for a few months to see if they improve.    In regards to her cramping after intercourse, we discussed why that could be occurring.  I recommended trying intercourse with condom again to see if this happens.     Keara Steinberg MD  [facility]

## 2022-02-18 ENCOUNTER — VIRTUAL VISIT (OUTPATIENT)
Dept: OBGYN | Facility: CLINIC | Age: 37
End: 2022-02-18
Payer: COMMERCIAL

## 2022-02-18 DIAGNOSIS — N93.9 ABNORMAL UTERINE BLEEDING: Primary | ICD-10-CM

## 2022-02-18 PROBLEM — Z97.5 IUD (INTRAUTERINE DEVICE) IN PLACE: Status: RESOLVED | Noted: 2018-10-08 | Resolved: 2022-02-18

## 2022-02-18 PROCEDURE — 99213 OFFICE O/P EST LOW 20 MIN: CPT | Mod: 95 | Performed by: OBSTETRICS & GYNECOLOGY

## 2022-03-09 ENCOUNTER — MYC MEDICAL ADVICE (OUTPATIENT)
Dept: OBGYN | Facility: CLINIC | Age: 37
End: 2022-03-09
Payer: COMMERCIAL

## 2022-03-09 NOTE — TELEPHONE ENCOUNTER
Ok sounds perfect!    Have her give us a call if things are improving over there next few cycles.    Please have schedulers cancel SIS as well.

## 2022-05-06 ENCOUNTER — LAB (OUTPATIENT)
Dept: FAMILY MEDICINE | Facility: CLINIC | Age: 37
End: 2022-05-06
Attending: FAMILY MEDICINE
Payer: COMMERCIAL

## 2022-05-06 DIAGNOSIS — Z20.822 SUSPECTED 2019 NOVEL CORONAVIRUS INFECTION: ICD-10-CM

## 2022-05-06 LAB — SARS-COV-2 RNA RESP QL NAA+PROBE: NEGATIVE

## 2022-05-06 PROCEDURE — U0003 INFECTIOUS AGENT DETECTION BY NUCLEIC ACID (DNA OR RNA); SEVERE ACUTE RESPIRATORY SYNDROME CORONAVIRUS 2 (SARS-COV-2) (CORONAVIRUS DISEASE [COVID-19]), AMPLIFIED PROBE TECHNIQUE, MAKING USE OF HIGH THROUGHPUT TECHNOLOGIES AS DESCRIBED BY CMS-2020-01-R: HCPCS

## 2022-05-06 PROCEDURE — U0005 INFEC AGEN DETEC AMPLI PROBE: HCPCS

## 2022-06-03 ENCOUNTER — OFFICE VISIT (OUTPATIENT)
Dept: URGENT CARE | Facility: URGENT CARE | Age: 37
End: 2022-06-03
Payer: COMMERCIAL

## 2022-06-03 ENCOUNTER — NURSE TRIAGE (OUTPATIENT)
Dept: NURSING | Facility: CLINIC | Age: 37
End: 2022-06-03
Payer: COMMERCIAL

## 2022-06-03 VITALS
OXYGEN SATURATION: 97 % | TEMPERATURE: 98.9 F | HEART RATE: 55 BPM | SYSTOLIC BLOOD PRESSURE: 102 MMHG | DIASTOLIC BLOOD PRESSURE: 58 MMHG

## 2022-06-03 DIAGNOSIS — J34.0 NOSE CELLULITIS: Primary | ICD-10-CM

## 2022-06-03 PROCEDURE — 99213 OFFICE O/P EST LOW 20 MIN: CPT | Performed by: FAMILY MEDICINE

## 2022-06-03 RX ORDER — CEPHALEXIN 500 MG/1
500 CAPSULE ORAL 3 TIMES DAILY
Qty: 21 CAPSULE | Refills: 0 | Status: SHIPPED | OUTPATIENT
Start: 2022-06-03 | End: 2022-06-07

## 2022-06-03 NOTE — CONFIDENTIAL NOTE
Patient is complaining of pain inside of nostrils on one side   Patient says one side of the nose is swollen and tender to touch  Patient denies any injury, punctures or lacerations  Onset of swelling started today on 06/03/22.  Triage guidelines recommend to see in office today  Caller verbalized and understands directives  COVID 19 Nurse Triage Plan/Patient Instructions    Please be aware that novel coronavirus (COVID-19) may be circulating in the community. If you develop symptoms such as fever, cough, or SOB or if you have concerns about the presence of another infection including coronavirus (COVID-19), please contact your health care provider or visit [email or web address].     Disposition/Instructions    In-Person Visit with provider recommended. Reference Visit Selection Guide.    Thank you for taking steps to prevent the spread of this virus.  o Limit your contact with others.  o Wear a simple mask to cover your cough.  o Wash your hands well and often.    Resources    [facility]: About COVID-19: [email or web address]    CDC: What to Do If You're Sick: www.cdc.gov/coronavirus/2019-ncov/about/steps-when-sick.html    CDC: Ending Home Isolation: www.cdc.gov/coronavirus/2019-ncov/hcp/disposition-in-home-patients.html     CDC: Caring for Someone: www.cdc.gov/coronavirus/2019-ncov/if-you-are-sick/care-for-someone.html     [facility]: Interim Guidance for Hospital Discharge to Home: www.health.[facility].[place]/diseases/coronavirus/hcp/hospdischarge.pdf    [facility] clinical trials (COVID-19 research studies): [email or web address]-clinical-trials     Below are the COVID-19 hotlines at the [place] [facility] ([facility]). Interpreters are available.   o For health questions: Call 025-010-8589 or 1-980.848.6448 (7 a.m. to 7 p.m.)  o For questions about schools and childcare: Call 624-521-9952 or 1-565.911.5341 (7 a.m. to 7 p.m.)

## 2022-06-03 NOTE — PROGRESS NOTES
ASSESSMENT/ PLAN:  Nose cellulitis     - cephALEXin (KEFLEX) 500 MG capsule; Take 1 capsule (500 mg) by mouth 3 times daily for 7 days      patient to monitor for signs or symptoms of worsening/ spreading infection.  Go to Urgent care or Emergency Department if worsening fevers/chills and/or spreading infection.  Warm, moist packs or towels can be applied to the region to aid in resolution of the infection.  Use Tylenol or ibuprofen for pain or fever.     ------------------------------------------------------------------------------------------------------------------------------------------    SUBJECTIVE:   Chief Complaint   Patient presents with     Urgent Care     Woke up this morning not feeling that well. Has had paid in the left side of her nose and now its inflamed and wants to make sure its not cellulitis      Halie Rendon is a 37 year old female who presents for evaluation of and area of redness, tenderness, swelling and warmth of the skin that developed on the  left, lateral  Nostril of the nose.  Patient has had pain, skin erythema (reddened skin) and tenderness for 1 day.    Precipitating event was unknown, -  Possible pimple inside the nose.    Therapies tried: none.    Past Medical History:   Diagnosis Date     Anxiety      Gestational diabetes mellitus     diet controlled     IUD contraception Nov 2018 -April 2019    Paragard     Nephrolithiasis     has a kidney stone     Postpartum depression 2014    No medication     Varicella vaccination 2014    documented immunity     Patient Active Problem List   Diagnosis     Family history of spina bifida     Personal history of renal calculi     Diet controlled gestational diabetes mellitus (GDM) in first trimester     Family history of congenital anomalies       ALLERGIES:  Patient has no known allergies.    multivitamin w/minerals (MULTI-VITAMIN) tablet, Take 1 tablet by mouth daily  Omega-3 Fatty Acids (FISH OIL PO),     No current  facility-administered medications on file prior to visit.      Social History     Tobacco Use     Smoking status: Former Smoker     Smokeless tobacco: Never Used     Tobacco comment: rare cigarette use   Substance Use Topics     Alcohol use: No     Comment: rare       Family History   Problem Relation Age of Onset     Breast Cancer Mother      Fibroids Mother      Depression Mother      Osteoporosis Mother      Hypertension Father      Prostate Cancer Father      No Known Problems Sister      No Known Problems Brother      Breast Cancer Maternal Grandmother      No Known Problems Maternal Grandfather      No Known Problems Paternal Grandmother      Heart Disease Paternal Grandfather         MI-     No Known Problems Daughter      Club foot Daughter      Cancer Maternal Aunt         lymphoma     Liver Cancer Paternal Uncle      Cancer Other         maternal great grandmother cervical or uterine cancer     Colon Cancer No family hx of        ROS:  CONSTITUTIONAL:NEGATIVE for fever, chills,    EYES: NEGATIVE for vision changes or irritation  ENT/MOUTH: NEGATIVE for ear, mouth and throat problems  RESP:NEGATIVE for significant cough or SOB  GI: NEGATIVE for nausea, abdominal pain, , or change in bowel habits    OBJECTIVE:  /58 (BP Location: Right arm, Patient Position: Sitting, Cuff Size: Adult Regular)   Pulse 55   Temp 98.9  F (37.2  C)   SpO2 97%     SKIN: area involved is 1 cm by 1 cm on the left,  lateral nostril of the nose.   The skin appear erythematous, moderately swollen, with tenderness and warmth to the touch.       GENERAL:  Alert, mild distress  EYES: EOMI,   conjunctiva clear  HENT: External ears with no swelling or lesions   lips without  Swelling, ulcers, erythema or lesions  NECK: normal pain free ROM  RESP: no labored respirations, no tachypnea  EXTREMITIES:   Full ROM without expression of pain or limitation x 4 extremities  NEURO: Normal strength and tone, ambulation without  difficulty,   normal speech and mentation

## 2022-06-05 ENCOUNTER — NURSE TRIAGE (OUTPATIENT)
Dept: NURSING | Facility: CLINIC | Age: 37
End: 2022-06-05
Payer: COMMERCIAL

## 2022-06-05 NOTE — TELEPHONE ENCOUNTER
Taking antibiotics and she forgot a dose    Instructions given on how to make it up.    Vicky Myrick RN  [facility] Nurse Advisor  8:27 AM  6/5/2022    Reason for Disposition    Caller has medication question only, adult not sick, and triager answers question    Protocols used: MEDICATION QUESTION CALL-A-

## 2022-06-07 ENCOUNTER — OFFICE VISIT (OUTPATIENT)
Dept: INTERNAL MEDICINE | Facility: CLINIC | Age: 37
End: 2022-06-07
Payer: COMMERCIAL

## 2022-06-07 ENCOUNTER — NURSE TRIAGE (OUTPATIENT)
Dept: FAMILY MEDICINE | Facility: CLINIC | Age: 37
End: 2022-06-07

## 2022-06-07 VITALS
OXYGEN SATURATION: 98 % | WEIGHT: 120.1 LBS | RESPIRATION RATE: 16 BRPM | DIASTOLIC BLOOD PRESSURE: 70 MMHG | SYSTOLIC BLOOD PRESSURE: 115 MMHG | BODY MASS INDEX: 21.97 KG/M2 | HEART RATE: 65 BPM | TEMPERATURE: 97.3 F

## 2022-06-07 DIAGNOSIS — Z87.898 HISTORY OF ELEVATED ANTINUCLEAR ANTIBODY (ANA): ICD-10-CM

## 2022-06-07 DIAGNOSIS — F33.1 MODERATE EPISODE OF RECURRENT MAJOR DEPRESSIVE DISORDER (H): Primary | ICD-10-CM

## 2022-06-07 DIAGNOSIS — G43.109 MIGRAINE WITH AURA AND WITHOUT STATUS MIGRAINOSUS, NOT INTRACTABLE: ICD-10-CM

## 2022-06-07 LAB
ALBUMIN SERPL-MCNC: 4.4 G/DL (ref 3.4–5)
ALP SERPL-CCNC: 68 U/L (ref 40–150)
ALT SERPL W P-5'-P-CCNC: 16 U/L (ref 0–50)
ANION GAP SERPL CALCULATED.3IONS-SCNC: 6 MMOL/L (ref 3–14)
AST SERPL W P-5'-P-CCNC: 15 U/L (ref 0–45)
BILIRUB SERPL-MCNC: 0.3 MG/DL (ref 0.2–1.3)
BUN SERPL-MCNC: 13 MG/DL (ref 7–30)
CALCIUM SERPL-MCNC: 9.4 MG/DL (ref 8.5–10.1)
CHLORIDE BLD-SCNC: 106 MMOL/L (ref 94–109)
CO2 SERPL-SCNC: 26 MMOL/L (ref 20–32)
CREAT SERPL-MCNC: 0.61 MG/DL (ref 0.52–1.04)
DEPRECATED CALCIDIOL+CALCIFEROL SERPL-MC: 40 UG/L (ref 20–75)
ERYTHROCYTE [DISTWIDTH] IN BLOOD BY AUTOMATED COUNT: 11.5 % (ref 10–15)
GFR SERPL CREATININE-BSD FRML MDRD: >90 ML/MIN/1.73M2
GLUCOSE BLD-MCNC: 109 MG/DL (ref 70–99)
HCT VFR BLD AUTO: 41.3 % (ref 35–47)
HGB BLD-MCNC: 14.1 G/DL (ref 11.7–15.7)
MCH RBC QN AUTO: 32.8 PG (ref 26.5–33)
MCHC RBC AUTO-ENTMCNC: 34.1 G/DL (ref 31.5–36.5)
MCV RBC AUTO: 96 FL (ref 78–100)
PLATELET # BLD AUTO: 244 10E3/UL (ref 150–450)
POTASSIUM BLD-SCNC: 3.9 MMOL/L (ref 3.4–5.3)
PROT SERPL-MCNC: 7.7 G/DL (ref 6.8–8.8)
RBC # BLD AUTO: 4.3 10E6/UL (ref 3.8–5.2)
SODIUM SERPL-SCNC: 138 MMOL/L (ref 133–144)
TSH SERPL DL<=0.005 MIU/L-ACNC: 0.47 MU/L (ref 0.4–4)
VIT B12 SERPL-MCNC: 465 PG/ML (ref 193–986)
WBC # BLD AUTO: 7 10E3/UL (ref 4–11)

## 2022-06-07 PROCEDURE — 82306 VITAMIN D 25 HYDROXY: CPT | Performed by: NURSE PRACTITIONER

## 2022-06-07 PROCEDURE — 82607 VITAMIN B-12: CPT | Performed by: NURSE PRACTITIONER

## 2022-06-07 PROCEDURE — 36415 COLL VENOUS BLD VENIPUNCTURE: CPT | Performed by: NURSE PRACTITIONER

## 2022-06-07 PROCEDURE — 80053 COMPREHEN METABOLIC PANEL: CPT | Performed by: NURSE PRACTITIONER

## 2022-06-07 PROCEDURE — 86038 ANTINUCLEAR ANTIBODIES: CPT | Performed by: NURSE PRACTITIONER

## 2022-06-07 PROCEDURE — 85027 COMPLETE CBC AUTOMATED: CPT | Performed by: NURSE PRACTITIONER

## 2022-06-07 PROCEDURE — 99214 OFFICE O/P EST MOD 30 MIN: CPT | Performed by: NURSE PRACTITIONER

## 2022-06-07 PROCEDURE — 84443 ASSAY THYROID STIM HORMONE: CPT | Performed by: NURSE PRACTITIONER

## 2022-06-07 PROCEDURE — 96127 BRIEF EMOTIONAL/BEHAV ASSMT: CPT | Performed by: NURSE PRACTITIONER

## 2022-06-07 PROCEDURE — 86039 ANTINUCLEAR ANTIBODIES (ANA): CPT | Performed by: NURSE PRACTITIONER

## 2022-06-07 ASSESSMENT — PATIENT HEALTH QUESTIONNAIRE - PHQ9
SUM OF ALL RESPONSES TO PHQ QUESTIONS 1-9: 17
10. IF YOU CHECKED OFF ANY PROBLEMS, HOW DIFFICULT HAVE THESE PROBLEMS MADE IT FOR YOU TO DO YOUR WORK, TAKE CARE OF THINGS AT HOME, OR GET ALONG WITH OTHER PEOPLE: VERY DIFFICULT
SUM OF ALL RESPONSES TO PHQ QUESTIONS 1-9: 17

## 2022-06-07 ASSESSMENT — ANXIETY QUESTIONNAIRES
7. FEELING AFRAID AS IF SOMETHING AWFUL MIGHT HAPPEN: NEARLY EVERY DAY
6. BECOMING EASILY ANNOYED OR IRRITABLE: NEARLY EVERY DAY
5. BEING SO RESTLESS THAT IT IS HARD TO SIT STILL: NOT AT ALL
4. TROUBLE RELAXING: NEARLY EVERY DAY
1. FEELING NERVOUS, ANXIOUS, OR ON EDGE: MORE THAN HALF THE DAYS
GAD7 TOTAL SCORE: 17
8. IF YOU CHECKED OFF ANY PROBLEMS, HOW DIFFICULT HAVE THESE MADE IT FOR YOU TO DO YOUR WORK, TAKE CARE OF THINGS AT HOME, OR GET ALONG WITH OTHER PEOPLE?: VERY DIFFICULT
7. FEELING AFRAID AS IF SOMETHING AWFUL MIGHT HAPPEN: NEARLY EVERY DAY
GAD7 TOTAL SCORE: 17
3. WORRYING TOO MUCH ABOUT DIFFERENT THINGS: NEARLY EVERY DAY
GAD7 TOTAL SCORE: 17
2. NOT BEING ABLE TO STOP OR CONTROL WORRYING: NEARLY EVERY DAY

## 2022-06-07 ASSESSMENT — PAIN SCALES - GENERAL: PAINLEVEL: MILD PAIN (2)

## 2022-06-07 NOTE — TELEPHONE ENCOUNTER
"Nurse Triage SBAR    Is this a 2nd Level Triage? YES, LICENSED PRACTITIONER REVIEW IS REQUIRED    Situation:   Pt reports new on set of depressive symptoms with thoughts of suicide after starting med Friday 6/3/22 for cellulitis. Feeling safe currently, denies suicidal ideation or thoughts of harming herself or others now. Pt also wondering if she should continue current course of antibiotics, feeling that depressive symptoms could be associated.     Background:   Pt reports no mental health care hx, expressing interest in initating care with a mental health practitioner; \"covid the last two years\" has been a major stressor.     Assessment:   Pt feeling unwell, starting course of antibiotics for cellulitis last week. Pt now experiencing depressive symptoms with low risk for harm; denies current suicidal ideation or plan. Needs further assessment and tx from provider: possible med change?, initiate psych care if appropriate.     Protocol Recommended Disposition:   See Today In Office    Recommendation:   Pt agrees with plan:   Future Appointments 6/7/2022 - 12/4/2022      Date Visit Type Length Department Provider     6/7/2022  2:00 PM OFFICE VISIT 30 min  INTERNAL MEDICINE Dawn Padilla, ISABELA CNP                      Routed to provider    Does the patient meet one of the following criteria for ADS visit consideration? 16+ years old, with an [facility] PCP     TIP  Providers, please consider if this condition is appropriate for management at one of our Acute and Diagnostic Services sites.     If patient is a good candidate, please use dotphrase <dot>triageresponse and select Refer to ADS to document.      1. CONCERN: \"What happened that made you call today?\"      Started med last Friday, Sunday started to feel very tired, depressed foggy. (Cephalexin 500 mg 3xd x 7 day course)    2. DEPRESSION SYMPTOM SCREENING: \"How are you feeling overall?\" (e.g., decreased energy, increased sleeping or difficulty sleeping, " "difficulty concentrating, feelings of sadness, guilt, hopelessness, or worthlessness)       Pt states she would be sleeping if young children weren't at home. Reporting feelings of sadness, guilt, hopeless.    3. RISK OF HARM - SUICIDAL IDEATION:  \"Do you ever have thoughts of hurting or killing yourself?\"  (e.g., yes, no, no but preoccupation with thoughts about death)    Crossed her mind but wouldn't do it, no plan at this time. Pt currently at home with , who works from home. She feels safe.       - INTENT:  \"Do you have thoughts of hurting or killing yourself right NOW?\" (e.g., yes, no, N/A) NO    - PLAN: \"Do you have a specific plan for how you would do this?\" (e.g., gun, knife, overdose, no plan, N/A)      N/A  4. RISK OF HARM - HOMICIDAL IDEATION:  \"Do you ever have thoughts of hurting or killing someone else?\"  (e.g., yes, no, no but preoccupation with thoughts about death) NO    - INTENT:  \"Do you have thoughts of hurting or killing someone right NOW?\" (e.g., yes, no, N/A) NO    - PLAN: \"Do you have a specific plan for how you would do this?\" (e.g., gun, knife, no plan, N/A)       no  5. FUNCTIONAL IMPAIRMENT: \"How have things been going for you overall in your life? Have you had any more difficulties than usual doing your normal daily activities?\"  (e.g., better, same, worse; self-care, school, work, interactions)      Same;  is supporting more with recent feeling of fatigue, infection, depressed feelings.   6. SUPPORT: \"Who is with you now?\"  \"Who do you live with?\" \"Do you have family or friends nearby who you can talk to?\"        with her now, young children at home, has people she can talk to that are close to her.  7. THERAPIST: \"Do you have a counselor or therapist? Name?\"      No, pt expresses interest in initiating therapy.   8. STRESSORS: \"Has there been any new stress or recent changes in your life?\"      covid past 2 yrs has been difficult to cope with  9. DRUG " "ABUSE/ALCOHOL: \"Do you drink alcohol or use any illegal drugs?\"       no  10. OTHER: \"Do you have any other health or medical symptoms right now?\" (e.g., fever)        no  11. PREGNANCY: \"Is there any chance no    Reason for Disposition    Sometimes has thoughts of suicide    Additional Information    Negative: Patient attempted suicide    Negative: Patient is threatening suicide now    Negative: Violent behavior, or threatening to physically hurt or kill someone    Negative: Patient is very confused (disoriented, slurred speech) and no other adult (e.g., friend or family member) available    Negative: Difficult to awaken or acting very confused (disoriented, slurred speech) and new onset    Negative: Sounds like a life-threatening emergency to the triager    Negative: Alcohol use, abuse or dependence, question or problem related to    Negative: Drug abuse or dependence, question or problem related to    Negative: Suicide thoughts, threats, attempts, or questions    Negative: Anxiety is main problem or symptom    Negative: Depression and unable to do any of normal activities (e.g., self care, school, work; in comparison to baseline).    Negative: Very strange or confused behavior    Negative: Patient sounds very sick or weak to the triager    Negative: Fever > 101 F (38.3 C)    Protocols used: DEPRESSION-A-OH      "

## 2022-06-07 NOTE — PROGRESS NOTES
Assessment & Plan     Moderate episode of recurrent major depressive disorder (H)  - Patient is seen in clinic today for worsening mood.  Hx of post partum depression, managed with psychotherapy.  Thinks mood worsened after starting Keflex for nasal cellulitis  - Stop Keflex- nasal cellulitis has resolved  - Check labs  - Discussed medication therapy, psychotherapy, or both.  Patient would like to avoid medication therapy.  I did discuss pros/side effects of most antidepressants.    - Patient reports she feels safe, no specific SI/plan; has supportive family  - Referral to Behavioral Health for psychotherapy  - Check labs- r/o other cause of worsening mood  - Return precautions reviewed  - Informed of Empath unit  - Given MH/crisis resources  - REVIEW OF HEALTH MAINTENANCE PROTOCOL ORDERS  - TSH with free T4 reflex  - Vitamin D Deficiency  - Vitamin B12  - Comprehensive metabolic panel (BMP + Alb, Alk Phos, ALT, AST, Total. Bili, TP)  - CBC with platelets  - Adult Mental Health  Referral  - TSH with free T4 reflex  - Vitamin D Deficiency  - Vitamin B12  - Comprehensive metabolic panel (BMP + Alb, Alk Phos, ALT, AST, Total. Bili, TP)  - CBC with platelets    History of elevated antinuclear antibody (ELI)  - Recheck  - Anti Nuclear Mena IgG by IFA with Reflex  - Anti Nuclear Mena IgG by IFA with Reflex    Migraine with aura and without status migrainosus, not intractable  - Sounds like headaches have improved, but continue; will have pt see Neurology- pt agrees  - Adult Neurology  Referral           Depression Screening Follow Up    PHQ 6/7/2022   PHQ-9 Total Score 17   Q9: Thoughts of better off dead/self-harm past 2 weeks Several days   F/U: Thoughts of suicide or self-harm Yes   F/U: Self harm-plan Yes   F/U: Self-harm action No   F/U: Safety concerns No     Last PHQ-9 6/7/2022   1.  Little interest or pleasure in doing things 3   2.  Feeling down, depressed, or hopeless 3   3.  Trouble falling or  staying asleep, or sleeping too much 1   4.  Feeling tired or having little energy 3   5.  Poor appetite or overeating 1   6.  Feeling bad about yourself 3   7.  Trouble concentrating 2   8.  Moving slowly or restless 0   Q9: Thoughts of better off dead/self-harm past 2 weeks 1   PHQ-9 Total Score 17   Difficulty at work, home, or with people -   In the past two weeks have you had thoughts of suicide or self harm? Yes   Do you have concerns about your personal safety or the safety of others? No   In the past 2 weeks have you thought about a plan or had intention to harm yourself? Yes   In the past 2 weeks have you acted on these thoughts in any way? No     GINGER-7 SCORE 9/11/2019 6/7/2022   Total Score - 17 (severe anxiety)   Total Score 19 17     PHQ-2 Score:     PHQ-2 ( 1999 Pf[employer]) 6/7/2022 1/12/2022   Q1: Little interest or pleasure in doing things - 2   Q2: Feeling down, depressed or hopeless - -   PHQ-2 Score - -   PHQ-2 Total Score (12-17 Years)- Positive if 3 or more points; Administer PHQ-A if positive - -   PHQ-2 Score Incomplete -       Follow Up      Follow Up Actions Taken  Crisis resource information provided in the After Visit Summary  Mental Health Referral placed    Discussed the following ways the patient can remain in a safe environment:  be around others     See Patient Instructions    Return for If symptoms worsen/fail to improve.    ISABELA Fernandez [facility]    Camden Ambrose is a 37 year old who presents for the following health issues     History of Present Illness       Mental Health Follow-up:  Patient presents to follow-up on Depression & Anxiety.Patient's depression since last visit has been:  Worse  The patient is having other symptoms associated with depression.  Patient's anxiety since last visit has been:  Bad  The patient is having other symptoms associated with anxiety.  Any significant life events: No  Patient is feeling anxious or  "having panic attacks.  Patient has no concerns about alcohol or drug use.    She eats 2-3 servings of fruits and vegetables daily.She consumes 0 sweetened beverage(s) daily.She exercises with enough effort to increase her heart rate 30 to 60 minutes per day.  She exercises with enough effort to increase her heart rate 4 days per week.   She is taking medications regularly.    Today's PHQ-9         PHQ-9 Total Score: 17    PHQ-9 Q9 Thoughts of better off dead/self-harm past 2 weeks :   Several days  Thoughts of suicide or self harm: (P) Yes  Self-harm Plan:   (P) Yes  Self-harm Action:     (P) No  Safety concerns for self or others: (P) No    How difficult have these problems made it for you to do your work, take care of things at home, or get along with other people: Very difficult  Today's GNIGER-7 Score: 17    Depression:    Patient is seen in clinic today for concerns of depression.  Patient reports that she does have a past medical history significant for anxiety and depression, notably postpartum depression that she managed with psychotherapy.  She reports also that she gets \"bad PMS\"- but has never been treated with medication therapy.  She reports that she was seen last week in urgent care for an infection of her nose/cellulitis, and was prescribed Keflex tablets.  On Sunday she started feeling especially bad.  She notes that she was unable to get out of bed on that particular day due to her mood. She is also been crying more than usual.  Has thoughts of dying but no specific plan.  She feels that since taking the antibiotic her mood has worsened.  She has noted that it is possible she had a worsening of mood just because she was not feeling well.  She reports that her nasal cellulitis has since improved and she no longer has any pain and so she feels much better in that respect.  She states she is eating and drinking okay.  She does note that she had a few night sweats been very difficult to sleep.  She has a " very supportive family.  She states that she is afraid to take medications due to side effects she has been have 9, namely low libido, possible stroke; would prefer to manage with therapy.      Is wondering if other issues or medical conditions could be causing her mood change.    Headaches:  States that she was recently declined with insurance due to her history of migraines and visual changes.  States she was told she needed to complete the work-up for this.  She previously had an MRI of her brain in 2020 which was normal.  She reports that she continues to get headaches throughout the month.  Reports initially having a visual disturbance where her vision appears weekly and then she will get a headache usually right behind her right eye.  She does report that her headaches have actually improved some.  She manages the symptoms with ibuprofen which is generally helpful.    History of elevated ELI:  Reports that her daughter was born with a complete heart block.  Endorses being told that generally this occurs in the setting of apparent having an autoimmune disorder.  Patient had autoimmune labs done in 2019 there were largely unremarkable other than a positive ELI.  She currently denies any joint pain, no muscle pain.  Does endorse dry eyes.    Review of Systems   CONSTITUTIONAL:NEGATIVE for fever, chills, change in weight  EYES: vision changes with headaches  ENT/MOUTH: nasal cellulitis improved  RESP:NEGATIVE for significant cough or SOB  CV: NEGATIVE for chest pain, palpitations or peripheral edema  MUSCULOSKELETAL: NEGATIVE for significant arthralgias or myalgia  NEURO: POSITIVE for HX headaches-migraine  PSYCHIATRIC: POSITIVE foranxiety and depressed mood      Objective    /70 (BP Location: Left arm, Patient Position: Chair, Cuff Size: Adult Regular)   Pulse 65   Temp 97.3  F (36.3  C) (Temporal)   Resp 16   Wt 54.5 kg (120 lb 1.6 oz)   LMP 05/12/2022   SpO2 98%   BMI 21.97 kg/m    Body mass index  is 21.97 kg/m .     Physical Exam   GENERAL APPEARANCE: healthy, alert and no distress  EYES: Eyes grossly normal to inspection, PERRL and conjunctivae and sclerae normal  HENT: ear canals and TM's normal and nose and mouth without ulcers or lesions  NECK: no adenopathy, no asymmetry, masses, or scars and thyroid normal to palpation  RESP: lungs clear to auscultation - no rales, rhonchi or wheezes  CV: regular rates and rhythm, normal S1 S2, no S3 or S4 and no murmur, click or rub  ABDOMEN: soft, nontender, without hepatosplenomegaly or masses and bowel sounds normal  MS: extremities normal- no gross deformities noted  SKIN: no suspicious lesions or rashes  PSYCH: Tearful

## 2022-06-07 NOTE — LETTER
My Depression Action Plan  Name: Halie Rendon   Date of Birth 1985  Date: 6/7/2022    My doctor: Altagracia Willis   My clinic: [address] [id number] [phone]          GREEN    ZONE   Good Control    What it looks like:     Things are going generally well. You have normal ups and downs. You may even feel depressed from time to time, but bad moods usually last less than a day.   What you need to do:  1. Continue to care for yourself (see self care plan)  2. Check your depression survival kit and update it as needed  3. Follow your physician s recommendations including any medication.  4. Do not stop taking medication unless you consult with your physician first.           YELLOW         ZONE Getting Worse    What it looks like:     Depression is starting to interfere with your life.     It may be hard to get out of bed; you may be starting to isolate yourself from others.    Symptoms of depression are starting to last most all day and this has happened for several days.     You may have suicidal thoughts but they are not constant.   What you need to do:     1. Call your care team. Your response to treatment will improve if you keep your care team informed of your progress. Yellow periods are signs an adjustment may need to be made.     2. Continue your self-care.  Just get dressed and ready for the day.  Don't give yourself time to talk yourself out of it.    3. Talk to someone in your support network.    4. Open up your Depression Self-Care Plan/Wellness Kit.           RED    ZONE Medical Alert - Get Help    What it looks like:     Depression is seriously interfering with your life.     You may experience these or other symptoms: You can t get out of bed most days, can t work or engage in other necessary activities, you have trouble taking care of basic hygiene, or basic responsibilities, thoughts of suicide or  death that will not go away, self-injurious behavior.     What you need to do:  1. Call your care team and request a same-day appointment. If they are not available (weekends or after hours) call your local crisis line, emergency room or 911.          Depression Self-Care Plan / Wellness Kit    Many people find that medication and therapy are helpful treatments for managing depression. In addition, making small changes to your everyday life can help to boost your mood and improve your wellbeing. Below are some tips for you to consider. Be sure to talk with your medical provider and/or behavioral health consultant if your symptoms are worsening or not improving.     Sleep   Sleep hygiene  means all of the habits that support good, restful sleep. It includes maintaining a consistent bedtime and wake time, using your bedroom only for sleeping or sex, and keeping the bedroom dark and free of distractions like a computer, smartphone, or television.     Develop a Healthy Routine  Maintain good hygiene. Get out of bed in the morning, make your bed, brush your teeth, take a shower, and get dressed. Don t spend too much time viewing media that makes you feel stressed. Find time to relax each day.    Exercise  Get some form of exercise every day. This will help reduce pain and release endorphins, the  feel good  chemicals in your brain. It can be as simple as just going for a walk or doing some gardening, anything that will get you moving.      Diet  Strive to eat healthy foods, including fruits and vegetables. Drink plenty of water. Avoid excessive sugar, caffeine, alcohol, and other mood-altering substances.     Stay Connected with Others  Stay in touch with friends and family members.    Manage Your Mood  Try deep breathing, massage therapy, biofeedback, or meditation. Take part in fun activities when you can. Try to find something to smile about each day.     Psychotherapy  Be open to working with a therapist if your  provider recommends it.     Medication  Be sure to take your medication as prescribed. Most anti-depressants need to be taken every day. It usually takes several weeks for medications to work. Not all medicines work for all people. It is important to follow-up with your provider to make sure you have a treatment plan that is working for you. Do not stop your medication abruptly without first discussing it with your provider.    Crisis Resources   These hotlines are for both adults and children. They and are open 24 hours a day, 7 days a week unless noted otherwise.      National Suicide Prevention Lifeline   6-870-530-RQQH (7420)      Crisis Text Line    www.crisistextline.org  Text HOME to 584576 from anywhere in the [place], anytime, about any type of crisis. A live, trained crisis counselor will receive the text and respond quickly.      [place] Lifeline for LGBTQ Youth  A national crisis intervention and suicide lifeline for LGBTQ youth under 25. Provides a safe place to talk without judgement. Call 1-251.325.7900; text START to 527924 or visit www.thetrevorproject.org to talk to a trained counselor.      For [facility] crisis numbers, visit the [facility] website at:  https://[place].gov/dhs/people-we-serve/adults/health-care/mental-health/resources/crisis-contacts.jsp

## 2022-06-07 NOTE — TELEPHONE ENCOUNTER
0915 6/7/2022  Huddled with provider: plan for pt to be seen in office today.   Pt's next med dose scheduled for 6/7/2022 1500; plans to discuss with provider in visit.

## 2022-06-07 NOTE — PATIENT INSTRUCTIONS
-[facility] [place]- [phone]  -[facility] [place]- [phone]  OR  -Call your insurance company to determine who is covered- often times private practices can get you in sooner  -Let me know if you want to take medication for anxiety or depression

## 2022-06-07 NOTE — TELEPHONE ENCOUNTER
I would advise patient to stop antibiotic  Will see her this afternoon  Thanks  ISABELA Fernandez CNP

## 2022-06-08 DIAGNOSIS — R76.8 ELEVATED ANTINUCLEAR ANTIBODY (ANA) LEVEL: Primary | ICD-10-CM

## 2022-06-08 LAB
ANA PAT SER IF-IMP: ABNORMAL
ANA PAT SER IF-IMP: ABNORMAL
ANA SER QL IF: POSITIVE
ANA TITR SER IF: ABNORMAL {TITER}
ANA TITR SER IF: ABNORMAL {TITER}

## 2022-06-09 ENCOUNTER — TELEPHONE (OUTPATIENT)
Dept: INTERNAL MEDICINE | Facility: CLINIC | Age: 37
End: 2022-06-09
Payer: COMMERCIAL

## 2022-06-09 DIAGNOSIS — F33.1 MODERATE EPISODE OF RECURRENT MAJOR DEPRESSIVE DISORDER (H): Primary | ICD-10-CM

## 2022-06-09 RX ORDER — SERTRALINE HYDROCHLORIDE 25 MG/1
25 TABLET, FILM COATED ORAL DAILY
Qty: 30 TABLET | Refills: 1 | Status: SHIPPED | OUTPATIENT
Start: 2022-06-09 | End: 2022-07-06

## 2022-06-09 NOTE — TELEPHONE ENCOUNTER
Spoke to the patient and she will  the medication .     Gabi Pang RN  [facility]

## 2022-06-09 NOTE — TELEPHONE ENCOUNTER
Patient calling to request to start Zoloft that was discussed at recent OV 6/7/22.   Patient would like to start on lowest dose if possible.     Pharmacy pended    Can we leave a detailed message on this number? YES  Phone number patient can be reached at: Home number on file [phone] (home)    Rashmi Bonilla RN  [facility] Triage

## 2022-06-09 NOTE — TELEPHONE ENCOUNTER
Patient Contact    Attempt # 1    Was call answered?  No.  Unable to leave message. Phone rings, no VM set up.

## 2022-06-09 NOTE — TELEPHONE ENCOUNTER
Sertraline 25 mg daily ordered and sent to pharmacy  Please ask patient to follow up in 1 month with PCP, or sooner if she has any questions or concerns  Thank you,  ISABELA Fernandez CNP

## 2022-06-27 ENCOUNTER — LAB (OUTPATIENT)
Dept: FAMILY MEDICINE | Facility: CLINIC | Age: 37
End: 2022-06-27
Attending: FAMILY MEDICINE
Payer: COMMERCIAL

## 2022-06-27 DIAGNOSIS — Z20.822 SUSPECTED 2019 NOVEL CORONAVIRUS INFECTION: ICD-10-CM

## 2022-06-27 LAB — SARS-COV-2 RNA RESP QL NAA+PROBE: NEGATIVE

## 2022-06-27 PROCEDURE — U0005 INFEC AGEN DETEC AMPLI PROBE: HCPCS

## 2022-06-27 PROCEDURE — U0003 INFECTIOUS AGENT DETECTION BY NUCLEIC ACID (DNA OR RNA); SEVERE ACUTE RESPIRATORY SYNDROME CORONAVIRUS 2 (SARS-COV-2) (CORONAVIRUS DISEASE [COVID-19]), AMPLIFIED PROBE TECHNIQUE, MAKING USE OF HIGH THROUGHPUT TECHNOLOGIES AS DESCRIBED BY CMS-2020-01-R: HCPCS

## 2022-07-04 DIAGNOSIS — F33.1 MODERATE EPISODE OF RECURRENT MAJOR DEPRESSIVE DISORDER (H): ICD-10-CM

## 2022-07-06 RX ORDER — SERTRALINE HYDROCHLORIDE 25 MG/1
25 TABLET, FILM COATED ORAL DAILY
Qty: 30 TABLET | Refills: 1 | Status: SHIPPED | OUTPATIENT
Start: 2022-07-06 | End: 2022-08-02

## 2022-07-06 NOTE — TELEPHONE ENCOUNTER
Patient called, stated she is almost out of medication, requests refill as soon as possible please.

## 2022-07-29 DIAGNOSIS — F33.1 MODERATE EPISODE OF RECURRENT MAJOR DEPRESSIVE DISORDER (H): ICD-10-CM

## 2022-08-02 RX ORDER — SERTRALINE HYDROCHLORIDE 25 MG/1
25 TABLET, FILM COATED ORAL DAILY
Qty: 30 TABLET | Refills: 1 | Status: SHIPPED | OUTPATIENT
Start: 2022-08-02 | End: 2022-08-29

## 2022-08-02 NOTE — TELEPHONE ENCOUNTER
Routing refill request to provider for review/approval because:  PHQ-9 score:    PHQ 6/7/2022   PHQ-9 Total Score 17   Q9: Thoughts of better off dead/self-harm past 2 weeks Several days   F/U: Thoughts of suicide or self-harm Yes   F/U: Self harm-plan Yes   F/U: Self-harm action No   F/U: Safety concerns No                Scott Vaughan RN  [facility] Triage Nurse

## 2022-08-02 NOTE — TELEPHONE ENCOUNTER
Spoke to patient to relay providers message. Patient verbalized understanding and agrees to schedule a follow-up later today.           Dawn Padilla APRN CNP  Ox Refill 11 minutes ago (11:43 AM)     Please ask pt to f/u with PCP since initiating sertraline.   Thanks ISABELA Fernandez CNP      Routing comment

## 2022-08-14 ENCOUNTER — NURSE TRIAGE (OUTPATIENT)
Dept: NURSING | Facility: CLINIC | Age: 37
End: 2022-08-14

## 2022-08-14 NOTE — TELEPHONE ENCOUNTER
"Patient calling reporting diarrhea starting Thursday 8/11/22 with up to 5 stools a day \"watery brown.\"    Reporting stools are now more frequent this morning after attempting to eat last evening. Reporting clear watery stools \"maybe 10 times\" since waking. Reporting she was able to sleep through the night.     Taking sports drinks, mouth moist, denies change in urine out put, denies dizziness.    Afebrile.     Family members with nausea, vomiting.     Disposition to see provider with in 24 hours.      Tash Westfall RN  [facility]      COVID 19 Nurse Triage Plan/Patient Instructions    Please be aware that novel coronavirus (COVID-19) may be circulating in the community. If you develop symptoms such as fever, cough, or SOB or if you have concerns about the presence of another infection including coronavirus (COVID-19), please contact your health care provider or visit [email or web address].     Disposition/Instructions    In-Person Visit with provider recommended. Reference Visit Selection Guide.    Thank you for taking steps to prevent the spread of this virus.  o Limit your contact with others.  o Wear a simple mask to cover your cough.  o Wash your hands well and often.    Resources    [facility]: About COVID-19: [email or web address]    CDC: What to Do If You're Sick: www.cdc.gov/coronavirus/2019-ncov/about/steps-when-sick.html    CDC: Ending Home Isolation: www.cdc.gov/coronavirus/2019-ncov/hcp/disposition-in-home-patients.html     CDC: Caring for Someone: www.cdc.gov/coronavirus/2019-ncov/if-you-are-sick/care-for-someone.html     [facility]: Interim Guidance for Hospital Discharge to Home: www.health.[facility].[place]/diseases/coronavirus/hcp/hospdischarge.pdf    [facility] clinical trials (COVID-19 research studies): [email or web address]     Below are the COVID-19 hotlines at the [place] Department of Health ([facility]). Interpreters are available. "   o For health questions: Call 822-671-4228 or 1-777.256.4789 (7 a.m. to 7 p.m.)  o For questions about schools and childcare: Call 822-685-3967 or 1-102.837.6044 (7 a.m. to 7 p.m.)                     Reason for Disposition    [1] MODERATE diarrhea (e.g., 4-6 times / day more than normal) AND [2] present > 48 hours (2 days)    Additional Information    Negative: Shock suspected (e.g., cold/pale/clammy skin, too weak to stand, low BP, rapid pulse)    Negative: Difficult to awaken or acting confused (e.g., disoriented, slurred speech)    Negative: Sounds like a life-threatening emergency to the triager    Negative: Vomiting also present and worse than the diarrhea    Negative: [1] Blood in stool AND [2] without diarrhea    Negative: Diarrhea in a cancer patient who is currently (or recently) receiving chemotherapy or radiation therapy, or cancer patient who has metastatic or end-stage cancer and is receiving palliative care    Negative: [1] SEVERE abdominal pain (e.g., excruciating) AND [2] present > 1 hour    Negative: [1] SEVERE abdominal pain AND [2] age > 60 years    Negative: [1] Blood in the stool AND [2] moderate or large amount of blood    Negative: Black or tarry bowel movements (Exception: chronic-unchanged black-grey bowel movements AND is taking iron pills or Pepto-bismol)    Negative: [1] Drinking very little AND [2] dehydration suspected (e.g., no urine > 12 hours, very dry mouth, very lightheaded)    Negative: Patient sounds very sick or weak to the triager    Negative: [1] SEVERE diarrhea (e.g., 7 or more times / day more than normal) AND [2] age > 60 years    Negative: [1] Constant abdominal pain AND [2] present > 2 hours    Negative: [1] Fever > 103 F (39.4 C) AND [2] not able to get the fever down using Fever Care Advice    Negative: [1] SEVERE diarrhea (e.g., 7 or more times / day more than normal) AND [2] present > 24 hours (1 day)    Protocols used: DIARRHEA-A-AH

## 2022-08-19 ENCOUNTER — LAB (OUTPATIENT)
Dept: FAMILY MEDICINE | Facility: CLINIC | Age: 37
End: 2022-08-19
Payer: COMMERCIAL

## 2022-08-19 DIAGNOSIS — Z20.822 ENCOUNTER FOR LABORATORY TESTING FOR COVID-19 VIRUS: ICD-10-CM

## 2022-08-19 LAB — SARS-COV-2 RNA RESP QL NAA+PROBE: NEGATIVE

## 2022-08-19 PROCEDURE — U0003 INFECTIOUS AGENT DETECTION BY NUCLEIC ACID (DNA OR RNA); SEVERE ACUTE RESPIRATORY SYNDROME CORONAVIRUS 2 (SARS-COV-2) (CORONAVIRUS DISEASE [COVID-19]), AMPLIFIED PROBE TECHNIQUE, MAKING USE OF HIGH THROUGHPUT TECHNOLOGIES AS DESCRIBED BY CMS-2020-01-R: HCPCS

## 2022-08-19 PROCEDURE — U0005 INFEC AGEN DETEC AMPLI PROBE: HCPCS

## 2022-08-29 DIAGNOSIS — F33.1 MODERATE EPISODE OF RECURRENT MAJOR DEPRESSIVE DISORDER (H): ICD-10-CM

## 2022-08-29 RX ORDER — SERTRALINE HYDROCHLORIDE 25 MG/1
25 TABLET, FILM COATED ORAL DAILY
Qty: 90 TABLET | Refills: 0 | Status: SHIPPED | OUTPATIENT
Start: 2022-08-29 | End: 2022-11-29

## 2022-08-30 NOTE — TELEPHONE ENCOUNTER
Was getting refills from Dawn Padilla.    3 month refill provided.    Additional refills from her PCP (Dr. Willis).

## 2022-09-06 ENCOUNTER — NURSE TRIAGE (OUTPATIENT)
Dept: FAMILY MEDICINE | Facility: CLINIC | Age: 37
End: 2022-09-06

## 2022-09-06 ENCOUNTER — OFFICE VISIT (OUTPATIENT)
Dept: FAMILY MEDICINE | Facility: CLINIC | Age: 37
End: 2022-09-06
Payer: COMMERCIAL

## 2022-09-06 VITALS
WEIGHT: 116 LBS | TEMPERATURE: 98.4 F | HEIGHT: 62 IN | HEART RATE: 60 BPM | RESPIRATION RATE: 16 BRPM | OXYGEN SATURATION: 98 % | BODY MASS INDEX: 21.35 KG/M2 | DIASTOLIC BLOOD PRESSURE: 60 MMHG | SYSTOLIC BLOOD PRESSURE: 92 MMHG

## 2022-09-06 DIAGNOSIS — K52.9 SEVERE DIARRHEA: Primary | ICD-10-CM

## 2022-09-06 PROCEDURE — 99213 OFFICE O/P EST LOW 20 MIN: CPT | Performed by: FAMILY MEDICINE

## 2022-09-06 ASSESSMENT — PATIENT HEALTH QUESTIONNAIRE - PHQ9
SUM OF ALL RESPONSES TO PHQ QUESTIONS 1-9: 4
10. IF YOU CHECKED OFF ANY PROBLEMS, HOW DIFFICULT HAVE THESE PROBLEMS MADE IT FOR YOU TO DO YOUR WORK, TAKE CARE OF THINGS AT HOME, OR GET ALONG WITH OTHER PEOPLE: NOT DIFFICULT AT ALL
SUM OF ALL RESPONSES TO PHQ QUESTIONS 1-9: 4

## 2022-09-06 NOTE — TELEPHONE ENCOUNTER
"Per protocol, patient to be seen in clinic today.   Warm transferred patient to central scheduling to assist with finding an appointment today. Patient advised if no available appointments today, to be seen in UC or ED today. Patient verbalized understanding.     Tamika Chin RN BSN MSN  [facility]      Reason for Disposition    MODERATE diarrhea (e.g., 4-6 times / day more than normal) and present > 48 hours (2 days)    Additional Information    Negative: Shock suspected (e.g., cold/pale/clammy skin, too weak to stand, low BP, rapid pulse)    Negative: Difficult to awaken or acting confused (e.g., disoriented, slurred speech)    Negative: Sounds like a life-threatening emergency to the triager    Negative: Vomiting also present and worse than the diarrhea    Negative: Blood in stool and without diarrhea    Negative: SEVERE abdominal pain (e.g., excruciating) and present > 1 hour    Negative: SEVERE abdominal pain and age > 60 years    Negative: Bloody, black, or tarry bowel movements (Exception: chronic-unchanged black-grey bowel movements and is taking iron pills or Pepto-Bismol)    Negative: SEVERE diarrhea (e.g., 7 or more times / day more than normal) and age > 60 years    Negative: Constant abdominal pain lasting > 2 hours    Negative: Drinking very little and has signs of dehydration (e.g., no urine > 12 hours, very dry mouth, very lightheaded)    Negative: Patient sounds very sick or weak to the triager    Answer Assessment - Initial Assessment Questions  1. DIARRHEA SEVERITY: \"How bad is the diarrhea?\" \"How many more stools have you had in the past 24 hours than normal?\"     - NO DIARRHEA (SCALE 0)    - MILD (SCALE 1-3): Few loose or mushy BMs; increase of 1-3 stools over normal daily number of stools; mild increase in ostomy output.    -  MODERATE (SCALE 4-7): Increase of 4-6 stools daily over normal; moderate increase in ostomy output.  * SEVERE (SCALE 8-10; OR 'WORST POSSIBLE'): " "Increase of 7 or more stools daily over normal; moderate increase in ostomy output; incontinence.      Today patient has had 4 loose stools.   2. ONSET: \"When did the diarrhea begin?\"       1 month  3. BM CONSISTENCY: \"How loose or watery is the diarrhea?\"       Watery  4. VOMITING: \"Are you also vomiting?\" If Yes, ask: \"How many times in the past 24 hours?\"       No  5. ABDOMINAL PAIN: \"Are you having any abdominal pain?\" If Yes, ask: \"What does it feel like?\" (e.g., crampy, dull, intermittent, constant)       Some abdominal discomfort, comes and goes. Patient also starting period today.   6. ABDOMINAL PAIN SEVERITY: If present, ask: \"How bad is the pain?\"  (e.g., Scale 1-10; mild, moderate, or severe)    - MILD (1-3): doesn't interfere with normal activities, abdomen soft and not tender to touch     - MODERATE (4-7): interferes with normal activities or awakens from sleep, abdomen tender to touch     - SEVERE (8-10): excruciating pain, doubled over, unable to do any normal activities        Intermittent cramping \"right before I have to go\" and resolves when patient has stool.   7. ORAL INTAKE: If vomiting, \"Have you been able to drink liquids?\" \"How much liquids have you had in the past 24 hours?\"     \"Maybe a decrease in appetite, but I've been eating.\"    8. HYDRATION: \"Any signs of dehydration?\" (e.g., dry mouth [not just dry lips], too weak to stand, dizziness, new weight loss) \"When did you last urinate?\"      Last void was 30 minutes ago. \"I'm peeing normally.\" Denies any weakness, dizziness.   9. EXPOSURE: \"Have you traveled to a foreign country recently?\" \"Have you been exposed to anyone with diarrhea?\" \"Could you have eaten any food that was spoiled?\"      No per patient   10. ANTIBIOTIC USE: \"Are you taking antibiotics now or have you taken antibiotics in the past 2 months?\"       No per patient.   11. OTHER SYMPTOMS: \"Do you have any other symptoms?\" (e.g., fever, blood in stool)        No per patient " "  12. PREGNANCY: \"Is there any chance you are pregnant?\" \"When was your last menstrual period?\"        No pregnancy, period started today.    Protocols used: DIARRHEA-A-OH      "

## 2022-09-06 NOTE — PROGRESS NOTES
ASSESMENT AND PLAN:  Diagnoses and all orders for this visit:  Severe diarrhea  Counseled the patient on good oral hydration, diet, indications for routine and emergent follow-up.  Counseled on differential diagnosis and we decided to proceed with testing as detailed below.  If all of the below testing is negative, then most likely cause would be a viral prolonged gastroenteritis versus GI symptoms related to her recent COVID-19 infection versus a non-C. diff bacterial overgrowth or antibiotic related diarrhea versus other.  She is going to start a probiotic regimen starting today.  Less likely would be a side effect related to her sertraline medication but she is going to double check with the pharmacy to make sure that she was given the correct dose.  Patient was prescribed sertraline by her usual clinic back in June and did not have any gastrointestinal issues or problems upon starting medication.  Around the time that these current symptoms started she did get a refill and reports that the pill looked different but there was no intentional dose change.  -     Clostridium difficile Toxin B PCR; Future  -     Cryptosporidium/Giardia Immunoassay; Future  -     Enteric Bacteria and Virus Panel by [facility] Stool; Future      Reviewed the risks and benefits of the treatment plan with the patient and/or caregiver and we discussed indications for routine and emergent follow-up.        SUBJECTIVE: 37-year-old female with a 3-week history of diarrhea.  Stools have been watery, about 7/day, associated with some intermittent abdominal cramping.  Otherwise no abdominal pain.  No fever or chills.  No blood in the stool.  Symptoms started at a family gathering in [place] where multiple members of the family developed gastrointestinal symptoms including vomiting and diarrhea.  The patient denies drinking water out of the lake or other similar setting at that time but does report that she did do a lot of swimming and may  "have gotten some water ingestion from the lake that way.  The other extended family members that became ill got better after just a few days but her symptoms have persisted.  She is wondering if this could be because she tested positive for COVID infection the week following the initial gastrointestinal symptoms.  The patient did take a course of antibiotics for cellulitis back in June.  No antibiotics since then.    Past Medical History:   Diagnosis Date     Anxiety      Gestational diabetes mellitus     diet controlled     IUD contraception Nov 2018 -April 2019    Paragard     Nephrolithiasis     has a kidney stone     Postpartum depression 2014    No medication     Varicella vaccination 2014    documented immunity     Patient Active Problem List   Diagnosis     Family history of spina bifida     Personal history of renal calculi     Diet controlled gestational diabetes mellitus (GDM) in first trimester     Family history of congenital anomalies     Current Outpatient Medications   Medication Sig Dispense Refill     sertraline (ZOLOFT) 25 MG tablet Take 1 tablet (25 mg) by mouth daily 90 tablet 0     multivitamin w/minerals (THERA-VIT-M) tablet Take 1 tablet by mouth daily (Patient not taking: Reported on 6/7/2022)       Omega-3 Fatty Acids (FISH OIL PO)  (Patient not taking: Reported on 6/7/2022)       History   Smoking Status     Former Smoker   Smokeless Tobacco     Never Used     Comment: rare cigarette use       OBJECTICE: BP 92/60 (BP Location: Left arm)   Pulse 60   Temp 98.4  F (36.9  C) (Oral)   Resp 16   Ht 1.575 m (5' 2\")   Wt 52.6 kg (116 lb)   LMP 09/06/2022   SpO2 98%   BMI 21.22 kg/m       No results found for this or any previous visit (from the past 24 hour(s)).     GEN-alert, appropriate, in no acute distress   HEENT-mucous membranes are moist   CV-regular rate and rhythm with no murmur   RESP-lungs clear to auscultation   ABDOMINAL-soft, diffuse mild tenderness to deep palpation, no " guarding or rebound, no focal tenderness.  No palpable mass.   SKIN-normal skin turgor      Jose Guadalupe Wu MD

## 2022-09-07 ENCOUNTER — LAB (OUTPATIENT)
Dept: LAB | Facility: CLINIC | Age: 37
End: 2022-09-07
Payer: COMMERCIAL

## 2022-09-07 DIAGNOSIS — K52.9 SEVERE DIARRHEA: ICD-10-CM

## 2022-09-07 LAB — C DIFF TOX B STL QL: NEGATIVE

## 2022-09-07 PROCEDURE — 87506 IADNA-DNA/RNA PROBE TQ 6-11: CPT | Performed by: FAMILY MEDICINE

## 2022-09-07 PROCEDURE — 87329 GIARDIA AG IA: CPT | Mod: 59 | Performed by: FAMILY MEDICINE

## 2022-09-07 PROCEDURE — 87493 C DIFF AMPLIFIED PROBE: CPT | Mod: 59 | Performed by: FAMILY MEDICINE

## 2022-09-07 PROCEDURE — 87328 CRYPTOSPORIDIUM AG IA: CPT | Performed by: FAMILY MEDICINE

## 2022-09-08 LAB
C COLI+JEJUNI+LARI FUSA STL QL NAA+PROBE: NOT DETECTED
C PARVUM AG STL QL IA: NEGATIVE
EC STX1 GENE STL QL NAA+PROBE: NOT DETECTED
EC STX2 GENE STL QL NAA+PROBE: NOT DETECTED
G LAMBLIA AG STL QL IA: NEGATIVE
NOROV GI+II ORF1-ORF2 JNC STL QL NAA+PR: NOT DETECTED
RVA NSP5 STL QL NAA+PROBE: NOT DETECTED
SALMONELLA SP RPOD STL QL NAA+PROBE: NOT DETECTED
SHIGELLA SP+EIEC IPAH STL QL NAA+PROBE: NOT DETECTED
V CHOL+PARA RFBL+TRKH+TNAA STL QL NAA+PR: NOT DETECTED
Y ENTERO RECN STL QL NAA+PROBE: NOT DETECTED

## 2022-09-10 ENCOUNTER — HEALTH MAINTENANCE LETTER (OUTPATIENT)
Age: 37
End: 2022-09-10

## 2022-10-31 ENCOUNTER — IMMUNIZATION (OUTPATIENT)
Dept: FAMILY MEDICINE | Facility: CLINIC | Age: 37
End: 2022-10-31
Payer: COMMERCIAL

## 2022-10-31 PROCEDURE — 90471 IMMUNIZATION ADMIN: CPT

## 2022-10-31 PROCEDURE — 90686 IIV4 VACC NO PRSV 0.5 ML IM: CPT

## 2022-11-28 DIAGNOSIS — F33.1 MODERATE EPISODE OF RECURRENT MAJOR DEPRESSIVE DISORDER (H): ICD-10-CM

## 2022-11-29 RX ORDER — SERTRALINE HYDROCHLORIDE 25 MG/1
TABLET, FILM COATED ORAL
Qty: 90 TABLET | Refills: 0 | Status: SHIPPED | OUTPATIENT
Start: 2022-11-29 | End: 2023-02-16

## 2022-11-29 NOTE — TELEPHONE ENCOUNTER
Medication filled 1 time as pt is due for a follow-up in clinic. Pharmacy has been notified to inform patient to call clinic and schedule appointment.   Prescription approved per [facility] Refill Protocol.  Justice L. Phoenix, RN

## 2022-12-28 ENCOUNTER — NURSE TRIAGE (OUTPATIENT)
Dept: FAMILY MEDICINE | Facility: CLINIC | Age: 37
End: 2022-12-28

## 2022-12-28 NOTE — TELEPHONE ENCOUNTER
Nurse Triage SBAR    Is this a 2nd Level Triage? YES, LICENSED PRACTITIONER REVIEW IS REQUIRED    Situation: Patient calling with known exposure to strep    Background: States she is starting to get a sore throat. Shared bed with her daughter who was dx with strep yesterdayl.    Assessment: Feels run down and mild sore throat    Protocol Recommended Disposition:   Strep Test Only Visit Today Or Tomorrow    Recommendation: Does PCP want visit or strep test done     Routed to provider    Does the patient meet one of the following criteria for ADS visit consideration? 16+ years old, with an [facility] PCP     TIP  Providers, please consider if this condition is appropriate for management at one of our Acute and Diagnostic Services sites.     If patient is a good candidate, please use dotphrase <dot>triageresponse and select Refer to ADS to document.    Melony Flowers RN on 12/28/2022 at 3:22 PM      Reason for Disposition    Strep exposure within last 10 days    Additional Information    Negative: SEVERE difficulty breathing (e.g., struggling for each breath, speaks in single words)    Negative: Sounds like a life-threatening emergency to the triager    Negative: Throat culture results, call about    Negative: Productive cough is main symptom    Negative: Runny nose is main symptom    Negative: Drooling or spitting out saliva (because can't swallow)    Negative: Unable to open mouth completely    Negative: Drinking very little and has signs of dehydration (e.g., no urine > 12 hours, very dry mouth, very lightheaded)    Negative: Patient sounds very sick or weak to the triager    Negative: Difficulty breathing (per caller) but not severe    Negative: Fever > 103 F (39.4 C)    Negative: Refuses to drink anything for > 12 hours    Negative: SEVERE sore throat pain    Negative: Pus on tonsils (back of throat) and swollen neck lymph nodes ('glands')    Negative: Earache also present    Negative: Widespread rash (especially  "chest and abdomen)    Negative: Diabetes mellitus or weak immune system (e.g., HIV positive, cancer chemo, splenectomy, organ transplant, chronic steroids)    Negative: History of rheumatic fever    Negative: Patient wants to be seen    Negative: Fever present > 3 days (72 hours)    Negative: Patient requesting a strep throat test    Answer Assessment - Initial Assessment Questions  1. ONSET: \"When did the throat start hurting?\" (Hours or days ago)       Today  2. SEVERITY: \"How bad is the sore throat?\" (Scale 1-10; mild, moderate or severe)    - MILD (1-3):  doesn't interfere with eating or normal activities    - MODERATE (4-7): interferes with eating some solids and normal activities    - SEVERE (8-10):  excruciating pain, interferes with most normal activities    - SEVERE DYSPHAGIA: can't swallow liquids, drooling      2-3/10  3. STREP EXPOSURE: \"Has there been any exposure to strep within the past week?\" If Yes, ask: \"What type of contact occurred?\"       12/27/22, sleeping in same bed  4.  VIRAL SYMPTOMS: \"Are there any symptoms of a cold, such as a runny nose, cough, hoarse voice or red eyes?\"       no  5. FEVER: \"Do you have a fever?\" If Yes, ask: \"What is your temperature, how was it measured, and when did it start?\"      no  6. PUS ON THE TONSILS: \"Is there pus on the tonsils in the back of your throat?\"      no  7. OTHER SYMPTOMS: \"Do you have any other symptoms?\" (e.g., difficulty breathing, headache, rash)      Sore throat, tired  8. PREGNANCY: \"Is there any chance you are pregnant?\" \"When was your last menstrual period?\"      No 12/01/22    Protocols used: SORE THROAT-A-OH      "

## 2022-12-29 NOTE — TELEPHONE ENCOUNTER
Spoke with patient an her only symptom is a slight  sore throat.     Her daughter had  Positive strep so she was more concerned with possible exposure. If her symptoms change and she has symptoms of strep, she will start the e-visit option.     Gabi Pang RN  [facility]

## 2022-12-29 NOTE — TELEPHONE ENCOUNTER
Provider Response to 2nd Level Triage Request    I have reviewed the RN documentation. My recommendation is:  Refer to Acute and Diagnostic Services (ADS) clinic.     Referral to Acute and Diagnostic Services      Able to get on lab only - Strep test

## 2023-02-13 NOTE — TELEPHONE ENCOUNTER
PSR's please call and schedule patient for venofer 200 mg x 5 infusions in the next 2 weeks.     Thank you.    Reviewing the visit in [facility], she was having headaches too.   Are the headaches still part to of the picture?  Her vision changes could be part of a type of Migraine with Aura.   I would suggest neurology referral.   I have 3 different sites on the referral for her as below   She can call and schedule the appt.       Otherwise I would suggest a follow up with her primary provider if there are any new symptoms or she has concerns.        provider has referred you for the following:   Consult at [facility]: [facility] [phone]   [email or web address]  [facility]: [facility] [place] [phone]   [email or web address]    [email or web address]  [facility]: [facility] [place] [phone]   [email or web address]    Please be aware that coverage of these services is subject to the terms and limitations of your health insurance plan.  Call member services at your health plan with any benefit or coverage questions.      Please bring the following with you to your appointment:    (1) Any X-Rays, CTs or MRIs which have been performed.  Contact the facility where they were done to arrange for  prior to your scheduled appointment.    (2) List of current medications  (3) This referral request   (4) Any documents/labs given to you for this referr

## 2023-02-15 NOTE — PROGRESS NOTES
Rheumatology Clinic Visit  [facility]  PEGGY Mariscal     Halie Rendon MRN# [id number]   YOB: 1985 Age: 38 year old   Date of Visit: 02/15/2023  Primary care provider: Altagracia Willis          Assessment and Plan:     1.  Elevated ELI  2.  Screening for tuberculosis    Patient presents today for an initial evaluation of a positive ELI.  In 2019 her daughter was diagnosed with heart block.  She states that upon evaluation she was told that the patient likely had an autoimmune disorder which was the cause of her daughter's heart block.  She went to her primary care provider and was found to have a positive ELI.  She does report having some dry eyes but nothing that has been significant or bothersome for her she does have a history of migraines that started in her 30s.  Some mild joint pain but nothing that has been prohibitive.  Physical examination was unremarkable.  There is no active synovitis, dactylitis, tenosynovitis or enthesopathy.  She had full joint range of motion without tenderness to palpation.  There were no skin rashes noted.  No mouth sores noted.  Previous laboratory evaluations were reviewed.  Results below.    Discussed with the patient that a positive ELI is of unknown significance.  10% of the healthy population can have a positive ELI.  We do get false positives with this test as well.  Discussed that a positive ELI can be associated with nonrheumatological autoimmune disorder such as thyroiditis.  It can also be associated with rheumatological autoimmune disorder such as connective tissue disorders or scleroderma.  A positive ELI has also been known to be associated with some viral and bacterial infections.  Given the positive ELI as well as her daughters heart block history, will further evaluate the positive ELI with further antibody testing.  Discussed with the patient that if these come back normal/negative, she does not currently fit the criteria for  lupus.  The other concern was Sjogren's syndrome.  She does not have any significant dry eye or dry mouth, however we could consider doing a minor lip biopsy should the SSA and SSB returned negative as well.  I will contact the patient with the results of the evaluation.  Should everything returned normal/negative I would want her to follow-up with me in 12 months.  She verbalized understanding.      Patient also needs to have her tuberculosis status checked prior to school.  We will check a TB Gold as well.    Plan:     1. Schedule follow-up with Anu Hopkins PA-C in 12 months.   2. Labs:Centromere, C3, C4, CRP, double-stranded DNA, HOMA panel, sed rate, SCL 70, thyroid peroxidase antibody, urine, QuantiFERON TB Gold.       Anu Hopkins, PEGGY  Rheumatology         History of Present Illness:   Halie Rendon presents for evaluation of elevated ELI, dry eyes.     She reports that in 2019 her daughter was diagnosed with heart block. She states that she was told that she likely had an autoimmune disorder that caused it. She states that she had the tests done, with the ELI returning positive. She states that due to her daughter having heart block she wanted to be evaluated. She states that she gets a rash on her chest when she is out in the sun. She thinks that she may have been having hair loss. No dry mouth. She thinks she has some dry eyes, but she does not need to use eye drops for it. She has a history of migraines, but due to starting in her 30's it was considered unusual for it. She has not seen neurology yet. She may have had some joint pain, but nothing that has been prohibitive or bothersome for her. No history of blood clots, seizures or miscarriages. She thinks her feet turn white when cold. She does not tolerate being cold. She has some fatigue, but has 2 young kids so it could be from that. No fevers or frequent infections. No history of pericarditis or pleuritis. She reports that she gets canker  sores, she thinks it may be related to certain foods and stress. They are painful when they happen. They are not frequent. She reports having a low pulse. She also feel that her neck sticks out. Her father had a benign tumor on his thyroid. No history of psychosis or delirium.            Review of Systems:     Constitutional: negative  Skin: negative  Eyes: as above  Ears/Nose/Throat: negative  Respiratory: No shortness of breath, dyspnea on exertion, cough, or hemoptysis  Cardiovascular: negative  Gastrointestinal: negative  Genitourinary: negative  Musculoskeletal: as above  Neurologic: negative  Psychiatric: negative  Hematologic/Lymphatic/Immunologic: negative  Endocrine: negative         Active Problem List:     Patient Active Problem List    Diagnosis Date Noted     Family history of congenital anomalies 2019     Priority: Medium     daugther with herat block at age [age]        Diet controlled gestational diabetes mellitus (GDM) in first trimester 2018     Priority: Medium     Family history of spina bifida 2018     Priority: Medium     Personal history of renal calculi 2018     Priority: Medium            Past Medical History:     Past Medical History:   Diagnosis Date     Anxiety      Gestational diabetes mellitus     diet controlled     IUD contraception 2018 -2019    Paragard     Nephrolithiasis     has a kidney stone     Postpartum depression     No medication     Varicella vaccination     documented immunity     Past Surgical History:   Procedure Laterality Date      SECTION N/A 10/2/2014    Procedure:  SECTION;  Surgeon: Domenic Timmons MD;  Location:  L+D      SECTION N/A 2018    Procedure:  SECTION;  repeat  section;  Surgeon: Domenic Timmons MD;  Location:  L+D     [facility]              Social History:     Social History     Socioeconomic History     Marital status:      Spouse  name: Not on file     Number of children: 2     Years of education: Not on file     Highest education level: Not on file   Occupational History     Occupation: self-employed/artist   Tobacco Use     Smoking status: Former     Smokeless tobacco: Never     Tobacco comments:     rare cigarette use   Substance and Sexual Activity     Alcohol use: No     Comment: rare     Drug use: No     Comment: Did smoke appx. 10 years ago     Sexual activity: Yes     Partners: Male     Birth control/protection: Male Surgical     Comment:  has vasec   Other Topics Concern      Service Not Asked     Blood Transfusions No     Caffeine Concern Not Asked     Occupational Exposure Not Asked     Hobby Hazards Not Asked     Sleep Concern Not Asked     Stress Concern Not Asked     Weight Concern Not Asked     Special Diet Not Asked     Back Care Not Asked     Exercise Not Asked     Bike Helmet Not Asked     Seat Belt Yes     Self-Exams Not Asked     Parent/sibling w/ CABG, MI or angioplasty before 65F 55M? 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, working , self employed artist      Social Determinants of Health     Financial Resource Strain: Not on file   Food Insecurity: Not on file   Transportation Needs: Not on file   Physical Activity: Not on file   Stress: Not on file   Social Connections: Not on file   Intimate Partner Violence: Not on file   Housing Stability: Not on file          Family History:     Family History   Problem Relation Age of Onset     Breast Cancer Mother      Fibroids Mother      Depression Mother      Osteoporosis Mother      Hypertension Father      Prostate Cancer Father      No Known Problems Sister      No Known Problems Brother      Breast Cancer Maternal Grandmother      No Known Problems Maternal Grandfather      No Known Problems Paternal Grandmother      Heart Disease Paternal Grandfather         MI-     No Known Problems Daughter       Club foot Daughter      Cancer Maternal Aunt         lymphoma     Liver Cancer Paternal Uncle      Cancer Other         maternal great grandmother cervical or uterine cancer     Colon Cancer No family hx of             Allergies:   No Known Allergies         Medications:     Current Outpatient Medications   Medication Sig Dispense Refill     multivitamin w/minerals (THERA-VIT-M) tablet Take 1 tablet by mouth daily (Patient not taking: Reported on 6/7/2022)       Omega-3 Fatty Acids (FISH OIL PO)  (Patient not taking: Reported on 6/7/2022)       sertraline (ZOLOFT) 25 MG tablet TAKE 1 TABLET BY MOUTH EVERY DAY 90 tablet 0            Physical Exam:   currently breastfeeding.  Wt Readings from Last 6 Encounters:   09/06/22 52.6 kg (116 lb)   06/07/22 54.5 kg (120 lb 1.6 oz)   02/03/22 54.4 kg (120 lb)   01/12/22 54.4 kg (120 lb)   06/22/20 54.8 kg (120 lb 12.8 oz)   04/29/20 54.4 kg (120 lb)     Constitutional: well-developed, appearing stated age; cooperative  Eyes: nl PERRLA, conjunctiva, sclera  ENT: nl external ears, nose, hearing, lips, teeth, gums, throat. No mucositis.   No mucous membrane lesions, normal saliva pool  Neck: no mass or thyroid enlargement  Resp: lungs clear to auscultation  CV: RRR, no murmurs, rubs or gallops, no edema  Lymph: no cervical, supraclavicular or epitrochlear nodes  MS: The TMJ, neck, shoulder, elbow, wrist, MCP/PIP/DIP, spine, hip, knee, ankle, and foot MTP/IP joints were examined and found normal. No active synovitis or altered joint anatomy. Full joint ROM. Normal  strength. No dactylitis,  tenosynovitis, enthesopathy.   Skin: no nail pitting, alopecia, rash, nodules or lesions.   Neuro: nl cranial nerves.   Psych: nl judgement, orientation, memory, affect.           Data:   Imaging:  none    Laboratory:  2019  Double-stranded DNA negative  SSA and SSB negative  Rheumatoid factor less than 20    6/7/2022  Creatinine 0.61, GFR greater than 90  Albumin 4.4  ALT 16, AST  15  TSH 0.47  Vitamin B12 465  Vitamin D 40  CBC within normal limits  ELI positive with a homogenous pattern and a nuclear dot pattern, both titers 1: 320

## 2023-02-16 ENCOUNTER — OFFICE VISIT (OUTPATIENT)
Dept: RHEUMATOLOGY | Facility: CLINIC | Age: 38
End: 2023-02-16
Payer: COMMERCIAL

## 2023-02-16 ENCOUNTER — LAB (OUTPATIENT)
Dept: LAB | Facility: CLINIC | Age: 38
End: 2023-02-16
Payer: COMMERCIAL

## 2023-02-16 VITALS
HEART RATE: 60 BPM | DIASTOLIC BLOOD PRESSURE: 58 MMHG | BODY MASS INDEX: 21.26 KG/M2 | SYSTOLIC BLOOD PRESSURE: 94 MMHG | WEIGHT: 120 LBS | HEIGHT: 63 IN

## 2023-02-16 DIAGNOSIS — Z11.1 SCREENING FOR TUBERCULOSIS: ICD-10-CM

## 2023-02-16 DIAGNOSIS — R76.8 ELEVATED ANTINUCLEAR ANTIBODY (ANA) LEVEL: ICD-10-CM

## 2023-02-16 DIAGNOSIS — Z11.59 NEED FOR HEPATITIS C SCREENING TEST: Primary | ICD-10-CM

## 2023-02-16 DIAGNOSIS — R76.8 ELEVATED ANTINUCLEAR ANTIBODY (ANA) LEVEL: Primary | ICD-10-CM

## 2023-02-16 LAB
ALBUMIN UR-MCNC: NEGATIVE MG/DL
APPEARANCE UR: CLEAR
BACTERIA #/AREA URNS HPF: ABNORMAL /HPF
BILIRUB UR QL STRIP: NEGATIVE
C3 SERPL-MCNC: 98 MG/DL (ref 81–157)
C4 SERPL-MCNC: 20 MG/DL (ref 13–39)
COLOR UR AUTO: YELLOW
CRP SERPL-MCNC: <3 MG/L
DSDNA AB SER-ACNC: <0.6 IU/ML
ENA SM IGG SER IA-ACNC: <0.7 U/ML
ENA SM IGG SER IA-ACNC: NEGATIVE
ENA SS-A AB SER IA-ACNC: <0.5 U/ML
ENA SS-A AB SER IA-ACNC: NEGATIVE
ENA SS-B IGG SER IA-ACNC: <0.6 U/ML
ENA SS-B IGG SER IA-ACNC: NEGATIVE
ERYTHROCYTE [SEDIMENTATION RATE] IN BLOOD BY WESTERGREN METHOD: 10 MM/HR (ref 0–20)
GLUCOSE UR STRIP-MCNC: NEGATIVE MG/DL
HCV AB SERPL QL IA: NONREACTIVE
HGB UR QL STRIP: ABNORMAL
KETONES UR STRIP-MCNC: NEGATIVE MG/DL
LEUKOCYTE ESTERASE UR QL STRIP: NEGATIVE
NITRATE UR QL: NEGATIVE
PH UR STRIP: 7 [PH] (ref 5–8)
RBC #/AREA URNS AUTO: ABNORMAL /HPF
SP GR UR STRIP: 1.01 (ref 1–1.03)
SQUAMOUS #/AREA URNS AUTO: ABNORMAL /LPF
U1 SNRNP IGG SER IA-ACNC: <1.1 U/ML
U1 SNRNP IGG SER IA-ACNC: NEGATIVE
UROBILINOGEN UR STRIP-ACNC: 0.2 E.U./DL
WBC #/AREA URNS AUTO: ABNORMAL /HPF

## 2023-02-16 PROCEDURE — 86481 TB AG RESPONSE T-CELL SUSP: CPT

## 2023-02-16 PROCEDURE — 86160 COMPLEMENT ANTIGEN: CPT | Mod: 59

## 2023-02-16 PROCEDURE — 86803 HEPATITIS C AB TEST: CPT

## 2023-02-16 PROCEDURE — 86376 MICROSOMAL ANTIBODY EACH: CPT

## 2023-02-16 PROCEDURE — 86235 NUCLEAR ANTIGEN ANTIBODY: CPT | Mod: 59

## 2023-02-16 PROCEDURE — 86160 COMPLEMENT ANTIGEN: CPT

## 2023-02-16 PROCEDURE — 86235 NUCLEAR ANTIGEN ANTIBODY: CPT

## 2023-02-16 PROCEDURE — 36415 COLL VENOUS BLD VENIPUNCTURE: CPT

## 2023-02-16 PROCEDURE — 86225 DNA ANTIBODY NATIVE: CPT

## 2023-02-16 PROCEDURE — 86235 NUCLEAR ANTIGEN ANTIBODY: CPT | Mod: 91

## 2023-02-16 PROCEDURE — 85652 RBC SED RATE AUTOMATED: CPT

## 2023-02-16 PROCEDURE — 81001 URINALYSIS AUTO W/SCOPE: CPT

## 2023-02-16 PROCEDURE — 99214 OFFICE O/P EST MOD 30 MIN: CPT | Performed by: PHYSICIAN ASSISTANT

## 2023-02-16 PROCEDURE — 86140 C-REACTIVE PROTEIN: CPT

## 2023-02-16 NOTE — PATIENT INSTRUCTIONS
After Visit Instructions:     Thank you for coming to [facility] for your care. It is my goal to partner with you to help you reach your optimal state of health.       Plan:     Schedule follow-up with Anu Hopkins PA-C in 12 months.   Labs: see below        Anu Hopkins PA-C  [facility]  [facility]    Contact information: [facility]  Clinic Number:  [phone]  Please call or send a [employer] message with any questions about your care

## 2023-02-17 LAB
CENTROMERE B AB SER-ACNC: <0.7 U/ML
CENTROMERE B AB SER-ACNC: NEGATIVE
ENA SCL70 IGG SER IA-ACNC: 0.7 U/ML
ENA SCL70 IGG SER IA-ACNC: NEGATIVE
QUANTIFERON MITOGEN: 10 IU/ML
QUANTIFERON NIL TUBE: 0.03 IU/ML
QUANTIFERON TB1 TUBE: 0.03 IU/ML
QUANTIFERON TB2 TUBE: 0.03
THYROPEROXIDASE AB SERPL-ACNC: <10 IU/ML

## 2023-02-18 LAB
GAMMA INTERFERON BACKGROUND BLD IA-ACNC: 0.03 IU/ML
M TB IFN-G BLD-IMP: NEGATIVE
M TB IFN-G CD4+ BCKGRND COR BLD-ACNC: 9.97 IU/ML
MITOGEN IGNF BCKGRD COR BLD-ACNC: 0 IU/ML
MITOGEN IGNF BCKGRD COR BLD-ACNC: 0 IU/ML

## 2023-03-22 ENCOUNTER — OFFICE VISIT (OUTPATIENT)
Dept: URGENT CARE | Facility: URGENT CARE | Age: 38
End: 2023-03-22
Payer: COMMERCIAL

## 2023-03-22 VITALS
BODY MASS INDEX: 22.48 KG/M2 | RESPIRATION RATE: 16 BRPM | WEIGHT: 124.9 LBS | TEMPERATURE: 97.7 F | HEART RATE: 61 BPM | SYSTOLIC BLOOD PRESSURE: 101 MMHG | DIASTOLIC BLOOD PRESSURE: 63 MMHG | OXYGEN SATURATION: 98 %

## 2023-03-22 DIAGNOSIS — R07.0 THROAT PAIN: Primary | ICD-10-CM

## 2023-03-22 LAB — DEPRECATED S PYO AG THROAT QL EIA: NEGATIVE

## 2023-03-22 PROCEDURE — 87651 STREP A DNA AMP PROBE: CPT | Performed by: PHYSICIAN ASSISTANT

## 2023-03-22 PROCEDURE — 99213 OFFICE O/P EST LOW 20 MIN: CPT | Mod: CS | Performed by: PHYSICIAN ASSISTANT

## 2023-03-22 PROCEDURE — U0005 INFEC AGEN DETEC AMPLI PROBE: HCPCS | Performed by: PHYSICIAN ASSISTANT

## 2023-03-22 PROCEDURE — U0003 INFECTIOUS AGENT DETECTION BY NUCLEIC ACID (DNA OR RNA); SEVERE ACUTE RESPIRATORY SYNDROME CORONAVIRUS 2 (SARS-COV-2) (CORONAVIRUS DISEASE [COVID-19]), AMPLIFIED PROBE TECHNIQUE, MAKING USE OF HIGH THROUGHPUT TECHNOLOGIES AS DESCRIBED BY CMS-2020-01-R: HCPCS | Performed by: PHYSICIAN ASSISTANT

## 2023-03-23 ENCOUNTER — TELEPHONE (OUTPATIENT)
Dept: FAMILY MEDICINE | Facility: CLINIC | Age: 38
End: 2023-03-23
Payer: COMMERCIAL

## 2023-03-23 DIAGNOSIS — J02.0 ACUTE STREPTOCOCCAL PHARYNGITIS: Primary | ICD-10-CM

## 2023-03-23 LAB
GROUP A STREP BY PCR: DETECTED
SARS-COV-2 RNA RESP QL NAA+PROBE: NEGATIVE

## 2023-03-23 RX ORDER — PENICILLIN V POTASSIUM 500 MG/1
500 TABLET, FILM COATED ORAL 2 TIMES DAILY
Qty: 20 TABLET | Refills: 0 | Status: SHIPPED | OUTPATIENT
Start: 2023-03-23 | End: 2023-04-02

## 2023-03-23 NOTE — PROGRESS NOTES
Strep PCR positive  RX Pen V 500 mg BID X 10 [facility]   no

## 2023-03-23 NOTE — PROGRESS NOTES
Assessment & Plan     1. Throat pain  No evidence of PTA /RPA or deep neck space abscess  RST is negative  Suspect viral illness, supportive cares encouraged  - Streptococcus A Rapid Screen w/Reflex to PCR - Clinic Collect  - Symptomatic COVID-19 Virus (Coronavirus) by PCR Nose  - Group A Streptococcus PCR Throat Swab      Return in about 5 days (around 3/27/2023), or if symptoms worsen or fail to improve.    Diagnosis and treatment plan was reviewed with patient and/or family.   We went over any labs or imaging. Discussed worsening symptoms or little to no relief despite treatment plan to follow-up with PCP or return to clinic.  Patient verbalizes understanding. All questions were addressed and answered.     Yolanda Curry PA-C  [facility] [place]    CHIEF COMPLAINT:   Chief Complaint   Patient presents with     Throat Pain     39 yo F presents with the following complaint throat pain onset  ,  neg covid tx- tylenol last dose 3pm     Camden Ambrose is a 38 year old female who presents to clinic today for evaluation of sore throat.  Symptoms started 3 to 4 days ago and have continued.  Denies having runny nose or congestion.  No cough.  No fever or chills.  Pain is made worse with swallowing.  She has taken Tylenol for symptoms.      Past Medical History:   Diagnosis Date     Anxiety      Gestational diabetes mellitus     diet controlled     IUD contraception 2018 -2019    Paragard     Nephrolithiasis     has a kidney stone     Postpartum depression     No medication     Varicella vaccination     documented immunity     Past Surgical History:   Procedure Laterality Date      SECTION N/A 10/2/2014    Procedure:  SECTION;  Surgeon: Domenic Timmons MD;  Location:  L+D      SECTION N/A 2018    Procedure:  SECTION;  repeat  section;  Surgeon: Domenic Timmons MD;  Location:  L[facility]D     [facility]        Social History     Tobacco Use     Smoking status: Former     Smokeless tobacco: Never     Tobacco comments:     rare cigarette use   Substance Use Topics     Alcohol use: No     Comment: rare     Current Outpatient Medications   Medication     multivitamin w/minerals (THERA-VIT-M) tablet     Omega-3 Fatty Acids (FISH OIL PO)     No current facility-administered medications for this visit.     No Known Allergies    10 point ROS of systems were all negative except for pertinent positives noted in my HPI.      Exam:   /63   Pulse 61   Temp 97.7  F (36.5  C)   Resp 16   Wt 56.7 kg (124 lb 14.4 oz)   SpO2 98%   BMI 22.48 kg/m    Constitutional: healthy, alert and no distress  Head: Normocephalic, atraumatic.  Eyes: conjunctiva clear, no drainage  ENT: TMs clear and shiny melonie, nasal mucosa pink and moist, throat erythematous, tonsils swollen B/L, No trismus or drooling.   Neck: neck is supple, no cervical lymphadenopathy or nuchal rigidity  Cardiovascular: RRR  Respiratory: CTA bilaterally, no rhonchi or rales  Skin: no rashes  Neurologic: Speech clear, gait normal. Moves all extremities.    Results for orders placed or performed in visit on 03/22/23   Streptococcus A Rapid Screen w/Reflex to PCR - Clinic Collect     Status: Normal    Specimen: Throat; Swab   Result Value Ref Range    Group A Strep antigen Negative Negative

## 2023-03-23 NOTE — TELEPHONE ENCOUNTER
Patient calling to request prescription regarding positive back up strep. Please advise.     Manolo OVERTON RN 3/23/2023 at 3:29 PM

## 2023-03-23 NOTE — TELEPHONE ENCOUNTER
Patient calling again regarding prescription. Please advise.     Manolo OVERTON RN 3/23/2023 at 4:57 PM

## 2023-03-24 NOTE — TELEPHONE ENCOUNTER
Sent a mychart msg to pt regarding to her medication provider had prescribed for her.     TANI Busch, [facility] March 24, 2023 8:27 AM

## 2023-03-29 ENCOUNTER — TELEPHONE (OUTPATIENT)
Dept: FAMILY MEDICINE | Facility: CLINIC | Age: 38
End: 2023-03-29
Payer: COMMERCIAL

## 2023-03-29 NOTE — TELEPHONE ENCOUNTER
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    Pt would like [facility] message when order has been approved.    Please advise.     Ai SANDERS RN  [facility] Triage Team

## 2023-03-29 NOTE — TELEPHONE ENCOUNTER
Pt updated relayed message below via [facility]    Per Dr Willis: Need appointment.  Virtual/ Video visit ok  If no appointment available soon enough      Ai SANDERS RN  [facility] Triage Team

## 2023-03-30 ENCOUNTER — VIRTUAL VISIT (OUTPATIENT)
Dept: FAMILY MEDICINE | Facility: CLINIC | Age: 38
End: 2023-03-30
Payer: COMMERCIAL

## 2023-03-30 DIAGNOSIS — Z01.84 IMMUNITY STATUS TESTING: Primary | ICD-10-CM

## 2023-03-30 PROCEDURE — 99212 OFFICE O/P EST SF 10 MIN: CPT | Mod: 95 | Performed by: NURSE PRACTITIONER

## 2023-03-30 ASSESSMENT — PATIENT HEALTH QUESTIONNAIRE - PHQ9
SUM OF ALL RESPONSES TO PHQ QUESTIONS 1-9: 7
10. IF YOU CHECKED OFF ANY PROBLEMS, HOW DIFFICULT HAVE THESE PROBLEMS MADE IT FOR YOU TO DO YOUR WORK, TAKE CARE OF THINGS AT HOME, OR GET ALONG WITH OTHER PEOPLE: SOMEWHAT DIFFICULT
SUM OF ALL RESPONSES TO PHQ QUESTIONS 1-9: 7

## 2023-03-31 ENCOUNTER — LAB (OUTPATIENT)
Dept: LAB | Facility: CLINIC | Age: 38
End: 2023-03-31
Payer: COMMERCIAL

## 2023-03-31 DIAGNOSIS — Z01.84 IMMUNITY STATUS TESTING: ICD-10-CM

## 2023-03-31 PROCEDURE — 86787 VARICELLA-ZOSTER ANTIBODY: CPT

## 2023-03-31 PROCEDURE — 36415 COLL VENOUS BLD VENIPUNCTURE: CPT

## 2023-04-03 LAB
VZV IGG SER QL IA: 1315 INDEX
VZV IGG SER QL IA: POSITIVE

## 2023-04-07 ENCOUNTER — NURSE TRIAGE (OUTPATIENT)
Dept: NURSING | Facility: CLINIC | Age: 38
End: 2023-04-07
Payer: COMMERCIAL

## 2023-04-07 ENCOUNTER — HOSPITAL ENCOUNTER (EMERGENCY)
Facility: CLINIC | Age: 38
Discharge: HOME | End: 2023-04-08
Attending: EMERGENCY MEDICINE | Admitting: EMERGENCY MEDICINE
Payer: COMMERCIAL

## 2023-04-07 ENCOUNTER — APPOINTMENT (OUTPATIENT)
Dept: RADIOLOGY | Facility: CLINIC | Age: 38
End: 2023-04-07
Attending: EMERGENCY MEDICINE
Payer: COMMERCIAL

## 2023-04-07 DIAGNOSIS — R07.9 CHEST PAIN, UNSPECIFIED TYPE: ICD-10-CM

## 2023-04-07 DIAGNOSIS — R00.2 PALPITATIONS: ICD-10-CM

## 2023-04-07 LAB
ANION GAP SERPL CALCULATED.3IONS-SCNC: 8 MMOL/L (ref 5–18)
ATRIAL RATE - MUSE: 64 BPM
BUN SERPL-MCNC: 18 MG/DL (ref 8–22)
CALCIUM SERPL-MCNC: 9.3 MG/DL (ref 8.5–10.5)
CHLORIDE BLD-SCNC: 106 MMOL/L (ref 98–107)
CO2 SERPL-SCNC: 25 MMOL/L (ref 22–31)
CREAT SERPL-MCNC: 0.71 MG/DL (ref 0.6–1.1)
DIASTOLIC BLOOD PRESSURE - MUSE: NORMAL MMHG
ERYTHROCYTE [DISTWIDTH] IN BLOOD BY AUTOMATED COUNT: 11.6 % (ref 10–15)
GFR SERPL CREATININE-BSD FRML MDRD: >90 ML/MIN/1.73M2
GLUCOSE BLD-MCNC: 86 MG/DL (ref 70–125)
HCT VFR BLD AUTO: 38.7 % (ref 35–47)
HGB BLD-MCNC: 13.5 G/DL (ref 11.7–15.7)
INTERPRETATION ECG - MUSE: NORMAL
MCH RBC QN AUTO: 33.3 PG (ref 26.5–33)
MCHC RBC AUTO-ENTMCNC: 34.9 G/DL (ref 31.5–36.5)
MCV RBC AUTO: 95 FL (ref 78–100)
P AXIS - MUSE: 56 DEGREES
PLATELET # BLD AUTO: 294 10E3/UL (ref 150–450)
POTASSIUM BLD-SCNC: 3.6 MMOL/L (ref 3.5–5)
PR INTERVAL - MUSE: 126 MS
QRS DURATION - MUSE: 68 MS
QT - MUSE: 402 MS
QTC - MUSE: 414 MS
R AXIS - MUSE: 87 DEGREES
RBC # BLD AUTO: 4.06 10E6/UL (ref 3.8–5.2)
SODIUM SERPL-SCNC: 139 MMOL/L (ref 136–145)
SYSTOLIC BLOOD PRESSURE - MUSE: NORMAL MMHG
T AXIS - MUSE: 70 DEGREES
TROPONIN I SERPL-MCNC: 0.02 NG/ML (ref 0–0.29)
VENTRICULAR RATE- MUSE: 64 BPM
WBC # BLD AUTO: 6.9 10E3/UL (ref 4–11)

## 2023-04-07 PROCEDURE — 84484 ASSAY OF TROPONIN QUANT: CPT | Performed by: EMERGENCY MEDICINE

## 2023-04-07 PROCEDURE — 85027 COMPLETE CBC AUTOMATED: CPT | Performed by: EMERGENCY MEDICINE

## 2023-04-07 PROCEDURE — 93005 ELECTROCARDIOGRAM TRACING: CPT | Performed by: EMERGENCY MEDICINE

## 2023-04-07 PROCEDURE — 99285 EMERGENCY DEPT VISIT HI MDM: CPT | Mod: 25

## 2023-04-07 PROCEDURE — 36415 COLL VENOUS BLD VENIPUNCTURE: CPT | Performed by: EMERGENCY MEDICINE

## 2023-04-07 PROCEDURE — 71046 X-RAY EXAM CHEST 2 VIEWS: CPT

## 2023-04-07 PROCEDURE — 80048 BASIC METABOLIC PNL TOTAL CA: CPT | Performed by: EMERGENCY MEDICINE

## 2023-04-07 ASSESSMENT — ACTIVITIES OF DAILY LIVING (ADL): ADLS_ACUITY_SCORE: 35

## 2023-04-08 VITALS
DIASTOLIC BLOOD PRESSURE: 63 MMHG | WEIGHT: 120 LBS | HEART RATE: 55 BPM | RESPIRATION RATE: 20 BRPM | OXYGEN SATURATION: 97 % | TEMPERATURE: 98.1 F | BODY MASS INDEX: 22.08 KG/M2 | SYSTOLIC BLOOD PRESSURE: 110 MMHG | HEIGHT: 62 IN

## 2023-04-08 NOTE — DISCHARGE INSTRUCTIONS
You were seen in the emergency department for chest pain and palpitations.     Please return to the Emergency Department immediately if you experience chest pain, difficulty breathing, and/or if your symptoms get worse, do not improve, or with any new concerns. Otherwise, please follow up with your primary physician within the next 2-3 days for recheck and ongoing management.  I also placed a referral for you to be seen in in the rapid access cardiology clinic.  You can talk to them about whether or not it would be beneficial to wear a Holter monitor.    Below is some information that you might find informative and useful.     Thank you for choosing [facility]. It was a pleasure taking care of you today!  -Dr. Tash Lehman

## 2023-04-08 NOTE — TELEPHONE ENCOUNTER
Nurse Triage SBAR    Situation: Chest pain.     Background: Patient calling. Chest pain for the last 2 weeks with occassional palpitations.     Assessment: Today she is having a hard time taking a deep breath. Pain in her jaw as well. Pressure like pain. Left arm pain as well.     Protocol Recommended Disposition: Call 911    Recommendation: According to the protocol, Patient should Call 911. Advised Patient to Call 911. Care advice given. Patient verbalizes understanding and agrees with plan of care. Reviewed concerning symptoms and when to call back.     Jayla Wong RN Nursing Advisor 4/7/2023 8:23 PM     Reason for Disposition    [1] Chest pain lasts > 5 minutes AND [2] age > 30 AND [3] one or more cardiac risk factors (e.g., diabetes, high blood pressure, high cholesterol, smoker, or strong family history of heart disease)    [1] Chest pain lasts > 5 minutes AND [2] described as crushing, pressure-like, or heavy    Additional Information    Negative: SEVERE difficulty breathing (e.g., struggling for each breath, speaks in single words)    Negative: Difficult to awaken or acting confused (e.g., disoriented, slurred speech)    Negative: Shock suspected (e.g., cold/pale/clammy skin, too weak to stand, low BP, rapid pulse)    Negative: Passed out (i.e., lost consciousness, collapsed and was not responding)    Negative: [1] Chest pain lasts > 5 minutes AND [2] age > 44    Protocols used: CHEST PAIN-A-

## 2023-04-08 NOTE — ED PROVIDER NOTES
"EMERGENCY DEPARTMENT ENCOUNTER      NAME: Halie Rendon  YOB: 1985  MRN: [id number]    FINAL IMPRESSION  1. Chest pain, unspecified type    2. Palpitations        MEDICAL DECISION MAKING   Pertinent Labs & Imaging studies reviewed. (See chart for details)    Halie Rendon is a 38 year old female who presents for evaluation of chest pain.  Patient reports approximately 2-week history of relatively constant left-sided chest \"pressure\" with intermittent \"stabbing\" pain.  Earlier today, she noticed that the discomfort began to radiate up into her left jaw so she called the nursing care line and was advised to come in for evaluation.  She has not had any associated nausea, diaphoresis, dyspnea.  She notes that she just completed a course of antibiotics for strep and her whole family also was sick with the same.  She otherwise denies sick contacts or recent travel.  She does not have a history of cardiac disease.  She does not smoke cigarettes and is not on birth control, nor does she have a history of blood clots.  Patient describes herself is otherwise healthy.  Remainder of history and exam, as below.     I considered a broad differential including but not limited to ACS/ischemia, unstable angina, CHF, pericarditis, myocarditis, pericardial effusion, PE, viral illness, pneumonia, aortic dissection, pneumothorax, costochondritis, pleurisy, GERD, muscular strain/sprain, anxiety, anemia. No hypoxia, pleuritic pain, tachypnea, or other 2/2 to suggest PE. Patient is PERC negative and is low risk by Wells. She is risk by HEART score, assuming normal troponin. Orders placed for labs and CXR. Patient took ASA PTA. Patient comfortable with plan and declined offer for analgesic. ECG on arrival was reassuring.     CBC reassuring. No evidence of leukocytosis to suggest systemic infectious/inflammatory process. No acute anemia. PLTs wnl. BMP reassuring. No evidence of LALO, acidosis, or significant electrolyte " derangement. Chest x-ray was also reassuring. Troponin negative. ACS less likely in the setting of ECG without acute ischemic changes and I do not feel delta troponin necessary given timing of symptoms.      I rechecked the patient and reviewed these results.  She felt reassured.  We discussed options for ongoing work-up and management.  As she has been experiencing some intermittent palpitations, will plan to send with a referral to rapid access clinic to determine whether or not she might benefit from Holter monitor.  In the meantime, I recommended continued conservative management of symptoms and patient felt comfortable with this plan.       Strict return precautions and follow up recommendations were discussed and all questions were answered. Patient endorsed understanding and was in agreement with plan.      Medical Decision Making    History:    Supplemental history from: Documented in chart, if applicable    External Record(s) reviewed: Documented in chart, if applicable.    Work Up:    Chart documentation includes differential considered and any EKGs or imaging independently interpreted by provider, where specified.    In additional to work up documented, I considered the following work up: Documented in chart, if applicable.    External consultation:    Discussion of management with another provider: Documented in chart, if applicable    Complicating factors:    Care impacted by chronic illness: N/A    Care affected by social determinants of health: N/A    Disposition considerations: Discharge. No recommendations on prescription strength medication(s). See documentation for any additional details.          ED COURSE  10:35 PM I met with the patient for an initial encounter and evaluation.  11:19 PM I rechecked the patient.     MEDICATIONS GIVEN IN THE ED  Medications - No data to display    NEW PRESCRIPTIONS STARTED AT TODAY'S VISIT  New Prescriptions    No medications on file       "    =================================================================    Chief Complaint   Patient presents with     Chest Pain     Jaw Pain         HPI:    Patient information was obtained from: Patient    Use of : N/A    Halie Rendon is a 38 year old female who presents to the ED by private car for an evaluation of chest pain and jaw pain.    Patient reports an onset of intermittent, mild chest pain which occured on 2/24. She described her pain as \"pressure\" which has been constant. However, her pain would \"twinge\" and feel \"fluttery\". Patient reports her pain occurs daily. However, today she endorsed mild arm pain and shortness of breath. She states she has trouble taking a \"deep breath in\". She notes taking aspirin for her symptoms this evening and did have some improvement thereafter.  She is currently not experiencing any shortness of breath.  She has not any recent fever, significant cough, congestion, lower extremity swelling or pain.    Patient reports she and her family had strep but was treated with a course of antibiotics.  Outside of that, she has no sick contacts or recent travel.  Patient reports a past medical history of acid reflex, which she attributes to the fish oil she was taking at the time.  Her daughter follows with cardiology for a heart block but she has no other personal or family history of cardiac disease.  She is not on birth control.           RELEVANT HISTORY, MEDICATIONS, & ALLERGIES   Past medical history, surgical history, family history, medications, and allergies reviewed and pertinent noted in HPI.    REVIEW OF SYSTEMS:  A complete review of systems was performed with pertinent positives and negatives noted in the HPI. All other systems negative.     PHYSICAL EXAM:    Vitals: /62   Pulse 54   Temp 98.1  F (36.7  C) (Oral)   Resp 19   Ht 1.575 m (5' 2\")   Wt 54.4 kg (120 lb)   LMP 03/24/2023   SpO2 97%   BMI 21.95 kg/m     General: Alert and " interactive, comfortable appearing.  HENT: Atraumatic. Full AROM of neck. Conjunctiva clear.   Cardiovascular: Regular rate and rhythm.   Chest/Pulmonary: Normal work of breathing. Speaking in complete sentences. Lungs CTAB. No chest wall tenderness or deformities.  Abdomen: Soft, nondistended. Nontender without guarding or rebound.  Extremities: Normal AROM of all major joints.  Skin: Warm and dry. Normal skin color.   Neuro: Speech clear. CNs grossly intact. Moves all extremities spontaneously.   Psych: Normal affect/mood, cooperative, memory appropriate.    LAB  Labs Ordered and Resulted from Time of ED Arrival to Time of ED Departure   CBC WITH PLATELETS - Abnormal       Result Value    WBC Count 6.9      RBC Count 4.06      Hemoglobin 13.5      Hematocrit 38.7      MCV 95      MCH 33.3 (*)     MCHC 34.9      RDW 11.6      Platelet Count 294     BASIC METABOLIC PANEL - Normal    Sodium 139      Potassium 3.6      Chloride 106      Carbon Dioxide (CO2) 25      Anion Gap 8      Urea Nitrogen 18      Creatinine 0.71      Calcium 9.3      Glucose 86      GFR Estimate >90     TROPONIN I - Normal    Troponin I 0.02         RADIOLOGY  XR Chest 2 Views   Final Result   IMPRESSION: Negative chest.          EKG  Performed at: 2052  Impression: Normal sinus rhythm.  No acute ischemic changes.  Normal intervals.  No previous for comparison.  Rate: 64  Rhythm: Sinus  QRS Interval: 68  QTc Interval: 414  Comparison: None    All laboratory and imaging results and EKG's were personally reviewed and interpreted by myself prior to disposition decision.         I, Vick Montgomery, am serving as a scribe to document services personally performed by Dr. Tash Lehman based on my observation and the provider's statements to me. I, Tash Lehman MD attest that Vick Montgomery is acting in a scribe capacity, has observed my performance of the services and has documented them in accordance with my direction.    Tash Lehman M.D.  Emergency  Medicine  [facility] EMERGENCY ROOM  [address] [id number] [phone]  Dept: [phone]     Tash Lehman MD  04/07/23 2322       Tash Lehman MD  04/11/23 0694

## 2023-04-08 NOTE — ED TRIAGE NOTES
"Patient arrives to the ER with c/o chest pressure and jaw pain. Patient has had some chest pressure, and palpitations. Per patient it feels like her \"heart is fluttering.\" SHe also states it is hard for her to take a deep breath.     Triage Assessment     Row Name 04/07/23 2050       Triage Assessment (Adult)    Airway WDL WDL       Respiratory WDL    Respiratory WDL X  feels like she can't take a deep breath       Skin Circulation/Temperature WDL    Skin Circulation/Temperature WDL WDL       Cardiac WDL    Cardiac WDL X;chest pain       Chest Pain Assessment    Chest Pain Location midsternal;anterior chest, left    Chest Pain Radiation jaw;arm    Character pressure    Chest Pain Intervention 12-lead ECG obtained       Peripheral/Neurovascular WDL    Peripheral Neurovascular WDL WDL       Cognitive/Neuro/Behavioral WDL    Cognitive/Neuro/Behavioral WDL WDL              "

## 2023-04-10 ENCOUNTER — TELEPHONE (OUTPATIENT)
Dept: FAMILY MEDICINE | Facility: CLINIC | Age: 38
End: 2023-04-10
Payer: COMMERCIAL

## 2023-04-10 NOTE — TELEPHONE ENCOUNTER
Patient states that she was seen in the Ed for chest and jaw pain. States that troponin was negative.  Patient states that she was advised to have an appointment wit cardiology this week. States that her pain goes away with aspirin. Patient has not tried an anti acid medication.     Patient thinks that her pain is due to heart burn and grinding her teeth. States that she would prefer to she Dr. Willis.     Advised patient to schedule with cardiology as advised by ED doctor. If found to have no concerns can schedule hospital follow up with Dr. Willis.    Routing as FYI to Dr. Willis.     Triage to call the patient back with advisement by Dr. Willis.    Dana Gomez RN

## 2023-04-11 NOTE — TELEPHONE ENCOUNTER
Agree that she should see cardiology  first , bc that would be more urgent to r/o and cardiac aetiology .

## 2023-04-12 ENCOUNTER — OFFICE VISIT (OUTPATIENT)
Dept: CARDIOLOGY | Facility: CLINIC | Age: 38
End: 2023-04-12
Payer: COMMERCIAL

## 2023-04-12 VITALS
BODY MASS INDEX: 23 KG/M2 | HEART RATE: 63 BPM | RESPIRATION RATE: 16 BRPM | HEIGHT: 62 IN | OXYGEN SATURATION: 98 % | SYSTOLIC BLOOD PRESSURE: 106 MMHG | WEIGHT: 125 LBS | DIASTOLIC BLOOD PRESSURE: 54 MMHG

## 2023-04-12 DIAGNOSIS — R07.9 CHEST PAIN, UNSPECIFIED TYPE: Primary | ICD-10-CM

## 2023-04-12 DIAGNOSIS — J02.0 STREPTOCOCCAL SORE THROAT: ICD-10-CM

## 2023-04-12 DIAGNOSIS — R00.2 PALPITATIONS: ICD-10-CM

## 2023-04-12 LAB — C REACTIVE PROTEIN LHE: 0.2 MG/DL (ref 0–?)

## 2023-04-12 PROCEDURE — 36415 COLL VENOUS BLD VENIPUNCTURE: CPT | Performed by: INTERNAL MEDICINE

## 2023-04-12 PROCEDURE — 99204 OFFICE O/P NEW MOD 45 MIN: CPT | Performed by: INTERNAL MEDICINE

## 2023-04-12 PROCEDURE — 86140 C-REACTIVE PROTEIN: CPT | Performed by: INTERNAL MEDICINE

## 2023-04-12 NOTE — PATIENT INSTRUCTIONS
Please contact direct nurses line Monday through Friday 8 AM to 5 PM @ [phone]  After-hours contact cardiology office at [phone].    Possible pericarditis  Possible GERD    3.   Will check CRP level for inflammation.

## 2023-04-12 NOTE — PROGRESS NOTES
HEART CARE ENCOUNTER CONSULTATON NOTE      [facility]  [phone]      Assessment/Recommendations   Assessment:   1. Atypical chest pain, non-exertional.  Mild worsening of symptoms with food.  Start with use of PCN for strep throat.   2. Palpitations, rare a few seconds. Two events past two weeks      Plan:   1.  Check CRP to rule out pericarditis or pleuritis  2.  If CRP is normal would recommend Pepcid for 2 weeks  3.  If CRP is elevated would recommend 2-week course of ibuprofen  4.  Discussed stress testing and Holter monitoring which we will defer at this time       History of Present Illness/Subjective    HPI: Halie Rendon is a 38 year old female no prior cardiac history presents to cardiac rapid access clinic for chest pain symptoms.    Patient was in her normal state of health when she developed strep throat in March.  After about 5 days of ongoing symptoms she underwent testing which was positive for strep.  She is started on appropriate medical therapy with penicillin.  With taking penicillin she started noticing discomfort in her epigastrium and mid chest.  There is also radiation up to her neck.  Given the symptoms to present to the emergency department on April 7, 2023.  There she underwent EKG which demonstrated no evidence of ischemia.  Troponin level was within the normal range.    She was recommended to see cardiology at rapid access clinic.  Currently she continues to have intermittent episodes of discomfort in her chest.  She states she was able to run 2 miles with no discomfort but noticed after exercise some discomfort in her abdomen and upper chest.  She also notes similar phenomenon after walking for 2 to 3 miles yesterday.  Again no symptoms during exercise but afterwards about 2030 minutes later she stenosing some discomfort.  She feels that food may worsen her symptoms as well.  She is a history of GERD in the past    No recent syncopal episodes.  She has had  syncope with pain in the past.    No family history of premature coronary disease.  No family history of sudden cardiac death.  Her daughter has congenital AV block which is being monitored.    ECG: Reviewed EKG from 2023.  Normal sinus rhythm.  Normal EKG  Echocardiogram Results:       Physical Examination  Review of Systems   Vitals: LMP 2023   BMI= Body mass index is 22.86 kg/m .  Wt Readings from Last 3 Encounters:   23 56.7 kg (125 lb)   23 54.4 kg (120 lb)   23 56.7 kg (124 lb 14.4 oz)        Pleasant   ENT/Mouth: membranes moist, no oral lesions or bleeding gums.      EYES:  no scleral icterus, normal conjunctivae       Chest/Lungs:   lungs are clear to auscultation, no rales or wheezing, no sternal scar, equal chest wall expansion    Cardiovascular:   Regular. Normal first and second heart sounds with no murmurs, rubs, or gallops; the carotid, radial and posterior tibial pulses are intact, Jugular venous pressure normal, no edema bilaterally        Extremities: no cyanosis or clubbing   Skin: no xanthelasma, warm.    Neurologic: normal  bilateral, no tremors     Psychiatric: alert and oriented x3, calm        Please refer above for cardiac ROS details.        Medical History  Surgical History Family History Social History   Past Medical History:   Diagnosis Date     Anxiety      Gestational diabetes mellitus     diet controlled     IUD contraception 2018 -2019    Paragard     Nephrolithiasis     has a kidney stone     Postpartum depression     No medication     Varicella vaccination     documented immunity     Past Surgical History:   Procedure Laterality Date      SECTION N/A 10/2/2014    Procedure:  SECTION;  Surgeon: Domenic Timmons MD;  Location:  [facility]      SECTION N/A 2018    Procedure:  SECTION;  repeat  section;  Surgeon: Domenic Timmons MD;  Location:  [facility]     [address]  History   Problem Relation Age of Onset     Breast Cancer Mother      Fibroids Mother      Depression Mother      Osteoporosis Mother      Hypertension Father      Prostate Cancer Father      No Known Problems Sister      No Known Problems Brother      Breast Cancer Maternal Grandmother      No Known Problems Maternal Grandfather      No Known Problems Paternal Grandmother      Heart Disease Paternal Grandfather         MI-     No Known Problems Daughter      Club foot Daughter      Cancer Maternal Aunt         lymphoma     Liver Cancer Paternal Uncle      Cancer Other         maternal great grandmother cervical or uterine cancer     Colon Cancer No family hx of         Social History     Socioeconomic History     Marital status:      Spouse name: Not on file     Number of children: 2     Years of education: Not on file     Highest education level: Not on file   Occupational History     Occupation: self-employed/artist   Tobacco Use     Smoking status: Former     Smokeless tobacco: Never     Tobacco comments:     rare cigarette use   Vaping Use     Vaping status: Not on file   Substance and Sexual Activity     Alcohol use: No     Comment: rare     Drug use: No     Comment: Did smoke appx. 10 years ago     Sexual activity: Yes     Partners: Male     Birth control/protection: Male Surgical     Comment:  has vasec   Other Topics Concern      Service Not Asked     Blood Transfusions No     Caffeine Concern Not Asked     Occupational Exposure Not Asked     Hobby Hazards Not Asked     Sleep Concern Not Asked     Stress Concern Not Asked     Weight Concern Not Asked     Special Diet Not Asked     Back Care Not Asked     Exercise Not Asked     Bike Helmet Not Asked     Seat Belt Yes     Self-Exams Not Asked     Parent/sibling w/ CABG, MI or angioplasty before 65F 55M? 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, working , self employed artist       Social Determinants of Health     Financial Resource Strain: Not on file   Food Insecurity: Not on file   Transportation Needs: Not on file   Physical Activity: Not on file   Stress: Not on file   Social Connections: Not on file   Intimate Partner Violence: Not on file   Housing Stability: Not on file           Medications  Allergies   Current Outpatient Medications   Medication Sig Dispense Refill     multivitamin w/minerals (THERA-VIT-M) tablet Take 1 tablet by mouth daily prn       Omega-3 Fatty Acids (FISH OIL PO) prn       No Known Allergies       Lab Results    Chemistry/lipid CBC Cardiac Enzymes/BNP/TSH/INR   Recent Labs   Lab Test 02/03/22  1057 10/14/15  0750   CHOL 184 157   HDL 68 57   LDL 92 84   TRIG 120 81   CHOLHDLRATIO  --  2.8     Recent Labs   Lab Test 02/03/22  1057 10/14/15  0750   LDL 92 84     Recent Labs   Lab Test 04/07/23  2240      POTASSIUM 3.6   CHLORIDE 106   CO2 25   GLC 86   BUN 18   CR 0.71   GFRESTIMATED >90   PAMELA 9.3     Recent Labs   Lab Test 04/07/23  2240 06/07/22  1453 02/03/22  1057   CR 0.71 0.61 0.58     Recent Labs   Lab Test 02/03/22  1057 06/22/20  1637 08/27/19  1537   A1C 5.1 5.1 4.9          Recent Labs   Lab Test 04/07/23  2240   WBC 6.9   HGB 13.5   HCT 38.7   MCV 95        Recent Labs   Lab Test 04/07/23  2240 06/07/22  1453 08/27/19  1537   HGB 13.5 14.1 14.0    Recent Labs   Lab Test 04/07/23  2240   TROPONINI 0.02     No results for input(s): BNP, NTBNPI, NTBNP in the last 40921 hours.  Recent Labs   Lab Test 06/07/22  1453   TSH 0.47     No results for input(s): INR in the last 59126 hours.     aDvid Daniels,     Today's clinic visit entailed:  57 minutes spent by me on the date of the encounter doing chart review, history and exam, documentation and further activities per the note.

## 2023-04-12 NOTE — LETTER
4/12/2023    Altagracia Willis MD  [address]  [place] [address]    RE: Halie Rendon       Dear Colleague,     I had the pleasure of seeing Halie Rendon in the [facility].    HEART CARE ENCOUNTER CONSULTATON NOTE      [facility]  [phone]      Assessment/Recommendations   Assessment:   1. Atypical chest pain, non-exertional.  Mild worsening of symptoms with food.  Start with use of PCN for strep throat.   2. Palpitations, rare a few seconds. Two events past two weeks      Plan:   1.  Check CRP to rule out pericarditis or pleuritis  2.  If CRP is normal would recommend Pepcid for 2 weeks  3.  If CRP is elevated would recommend 2-week course of ibuprofen  4.  Discussed stress testing and Holter monitoring which we will defer at this time       History of Present Illness/Subjective    HPI: Halie Rendon is a 38 year old female no prior cardiac history presents to cardiac rapid access clinic for chest pain symptoms.    Patient was in her normal state of health when she developed strep throat in March.  After about 5 days of ongoing symptoms she underwent testing which was positive for strep.  She is started on appropriate medical therapy with penicillin.  With taking penicillin she started noticing discomfort in her epigastrium and mid chest.  There is also radiation up to her neck.  Given the symptoms to present to the emergency department on April 7, 2023.  There she underwent EKG which demonstrated no evidence of ischemia.  Troponin level was within the normal range.    She was recommended to see cardiology at rapid access clinic.  Currently she continues to have intermittent episodes of discomfort in her chest.  She states she was able to run 2 miles with no discomfort but noticed after exercise some discomfort in her abdomen and upper chest.  She also notes similar phenomenon after walking for 2 to 3 miles yesterday.  Again no symptoms  during exercise but afterwards about 2030 minutes later she stenosing some discomfort.  She feels that food may worsen her symptoms as well.  She is a history of GERD in the past    No recent syncopal episodes.  She has had syncope with pain in the past.    No family history of premature coronary disease.  No family history of sudden cardiac death.  Her daughter has congenital AV block which is being monitored.    ECG: Reviewed EKG from 2023.  Normal sinus rhythm.  Normal EKG  Echocardiogram Results:       Physical Examination  Review of Systems   Vitals: LMP 2023   BMI= Body mass index is 22.86 kg/m .  Wt Readings from Last 3 Encounters:   23 56.7 kg (125 lb)   23 54.4 kg (120 lb)   23 56.7 kg (124 lb 14.4 oz)        Pleasant   ENT/Mouth: membranes moist, no oral lesions or bleeding gums.      EYES:  no scleral icterus, normal conjunctivae       Chest/Lungs:   lungs are clear to auscultation, no rales or wheezing, no sternal scar, equal chest wall expansion    Cardiovascular:   Regular. Normal first and second heart sounds with no murmurs, rubs, or gallops; the carotid, radial and posterior tibial pulses are intact, Jugular venous pressure normal, no edema bilaterally        Extremities: no cyanosis or clubbing   Skin: no xanthelasma, warm.    Neurologic: normal  bilateral, no tremors     Psychiatric: alert and oriented x3, calm        Please refer above for cardiac ROS details.        Medical History  Surgical History Family History Social History   Past Medical History:   Diagnosis Date    Anxiety     Gestational diabetes mellitus     diet controlled    IUD contraception 2018 -2019    Paragard    Nephrolithiasis     has a kidney stone    Postpartum depression     No medication    Varicella vaccination 2014    documented immunity     Past Surgical History:   Procedure Laterality Date     SECTION N/A 10/2/2014    Procedure:  SECTION;  Surgeon: Iain  Domenic DODGE MD;  Location:  L+D     SECTION N/A 2018    Procedure:  SECTION;  repeat  section;  Surgeon: Domenic Timmons MD;  Location: [facility]    [facility]       Family History   Problem Relation Age of Onset    Breast Cancer Mother     Fibroids Mother     Depression Mother     Osteoporosis Mother     Hypertension Father     Prostate Cancer Father     No Known Problems Sister     No Known Problems Brother     Breast Cancer Maternal Grandmother     No Known Problems Maternal Grandfather     No Known Problems Paternal Grandmother     Heart Disease Paternal Grandfather         MI-    No Known Problems Daughter     Club foot Daughter     Cancer Maternal Aunt         lymphoma    Liver Cancer Paternal Uncle     Cancer Other         maternal great grandmother cervical or uterine cancer    Colon Cancer No family hx of         Social History     Socioeconomic History    Marital status:      Spouse name: Not on file    Number of children: 2    Years of education: Not on file    Highest education level: Not on file   Occupational History    Occupation: self-employed/artist   Tobacco Use    Smoking status: Former    Smokeless tobacco: Never    Tobacco comments:     rare cigarette use   Vaping Use    Vaping status: Not on file   Substance and Sexual Activity    Alcohol use: No     Comment: rare    Drug use: No     Comment: Did smoke appx. 10 years ago    Sexual activity: Yes     Partners: Male     Birth control/protection: Male Surgical     Comment:  has vasec   Other Topics Concern     Service Not Asked    Blood Transfusions No    Caffeine Concern Not Asked    Occupational Exposure Not Asked    Hobby Hazards Not Asked    Sleep Concern Not Asked    Stress Concern Not Asked    Weight Concern Not Asked    Special Diet Not Asked    Back Care Not Asked    Exercise Not Asked    Bike Helmet Not Asked    Seat Belt Yes    Self-Exams Not Asked    Parent/sibling  w/ CABG, MI or angioplasty before 65F 55M? 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, working , self employed artist      Social Determinants of Health     Financial Resource Strain: Not on file   Food Insecurity: Not on file   Transportation Needs: Not on file   Physical Activity: Not on file   Stress: Not on file   Social Connections: Not on file   Intimate Partner Violence: Not on file   Housing Stability: Not on file           Medications  Allergies   Current Outpatient Medications   Medication Sig Dispense Refill    multivitamin w/minerals (THERA-VIT-M) tablet Take 1 tablet by mouth daily prn      Omega-3 Fatty Acids (FISH OIL PO) prn       No Known Allergies       Lab Results    Chemistry/lipid CBC Cardiac Enzymes/BNP/TSH/INR   Recent Labs   Lab Test 02/03/22  1057 10/14/15  0750   CHOL 184 157   HDL 68 57   LDL 92 84   TRIG 120 81   CHOLHDLRATIO  --  2.8     Recent Labs   Lab Test 02/03/22  1057 10/14/15  0750   LDL 92 84     Recent Labs   Lab Test 04/07/23  2240      POTASSIUM 3.6   CHLORIDE 106   CO2 25   GLC 86   BUN 18   CR 0.71   GFRESTIMATED >90   PAMELA 9.3     Recent Labs   Lab Test 04/07/23  2240 06/07/22  1453 02/03/22  1057   CR 0.71 0.61 0.58     Recent Labs   Lab Test 02/03/22  1057 06/22/20  1637 08/27/19  1537   A1C 5.1 5.1 4.9          Recent Labs   Lab Test 04/07/23  2240   WBC 6.9   HGB 13.5   HCT 38.7   MCV 95        Recent Labs   Lab Test 04/07/23  2240 06/07/22  1453 08/27/19  1537   HGB 13.5 14.1 14.0    Recent Labs   Lab Test 04/07/23  2240   TROPONINI 0.02     No results for input(s): BNP, NTBNPI, NTBNP in the last 77751 hours.  Recent Labs   Lab Test 06/07/22  1453   TSH 0.47     No results for input(s): INR in the last 13359 hours.     David Daniels DO    Today's clinic visit entailed:  57 minutes spent by me on the date of the encounter doing chart review, history and exam, documentation and further activities per  the note.           Thank you for allowing me to participate in the care of your patient.      Sincerely,     David Daniels [facility]  cc:   Tash Lehman MD  [address] [place] [address]

## 2023-04-30 ENCOUNTER — HEALTH MAINTENANCE LETTER (OUTPATIENT)
Age: 38
End: 2023-04-30

## 2023-05-09 ENCOUNTER — NURSE TRIAGE (OUTPATIENT)
Dept: INTERNAL MEDICINE | Facility: CLINIC | Age: 38
End: 2023-05-09
Payer: COMMERCIAL

## 2023-05-09 NOTE — TELEPHONE ENCOUNTER
"  ----- Message from Benjamin Hall MD sent at 5/9/2023  8:53 AM CDT -----  This patient of the [facility] was put into my schedule later this week for \"abdominal pain.\"  That should really be triaged.  Please contact patient and find out more information and make sure that she is seeing the correct provider at the correct time.  Thanks.       "

## 2023-05-09 NOTE — TELEPHONE ENCOUNTER
Nurse Triage SBAR    Is this a 2nd Level Triage? NO    Pt has been having upper abdominal pain (few inches above belly button) since this past Thursday. It is constant unless she takes pepcid extra strength once a day. Pepcid alleviates some pain but doesn't resolve the pain completely.     Pt has not noticed an association with eating certain foods. Pt denies pain radiating to chest. Pt does sometimes have to lay down for the pain but she is still able to talk/interact when she has the pain.     Pt denies chest pain, no report of pre-existing cardiac issues (pt recently saw cardiology).     Pt has not been seen for this type of pain in the past.       Routed to provider    Reason for Disposition    Pain is mainly in upper abdomen (if needed ask: 'is it mainly above the belly button?')    Patient wants to be seen    Additional Information    Negative: Passed out (i.e., fainted, collapsed and was not responding)    Negative: Shock suspected (e.g., cold/pale/clammy skin, too weak to stand, low BP, rapid pulse)    Negative: Sounds like a life-threatening emergency to the triager    Negative: Chest pain    Negative: Abdominal pain and pregnant < 20 weeks    Negative: Abdominal pain and pregnant 20 or more weeks    Negative: Passed out (i.e., fainted, collapsed and was not responding)    Negative: Shock suspected (e.g., cold/pale/clammy skin, too weak to stand, low BP, rapid pulse)    Negative: Visible sweat on face or sweat is dripping down    Negative: Chest pain    Negative: SEVERE abdominal pain (e.g., excruciating)    Negative: Pain lasting > 10 minutes and over 50 years old    Negative: Pain lasting > 10 minutes and over 40 years old and associated chest, arm, neck, upper back, or jaw pain    Negative: Pain lasting > 10 minutes and over 35 years old and at least one cardiac risk factor (i.e., hypertension, diabetes, obesity, smoker or strong family history of heart disease)    Negative: Pain lasting > 10 minutes  and history of heart disease (i.e., heart attack, bypass surgery, angina, angioplasty, CHF)    Negative: Recent injury to the abdomen    Negative: Vomiting red blood or black (coffee ground) material    Negative: Bloody, black, or tarry bowel movements  (Exception: Chronic-unchanged black-grey bowel movements and is taking iron pills or Pepto-Bismol.)    Negative: Pregnant 20 weeks or more and new hand or face swelling    Negative: Constant abdominal pain lasting > 2 hours    Negative: Vomiting bile (green color)    Negative: Patient sounds very sick or weak to the triager    Negative: White of the eyes have turned yellow (i.e., jaundice)    Negative: Vomiting and abdomen looks much more swollen than usual    Negative: Fever > 103 F (39.4 C)    Negative: Fever > 101 F (38.3 C) and over 60 years of age    Negative: Fever > 100.0 F (37.8 C) and has diabetes mellitus or a weak immune system (e.g., HIV positive, cancer chemotherapy, organ transplant, splenectomy, chronic steroids)    Negative: Fever > 100.0 F (37.8 C) and bedridden (e.g., nursing home patient, stroke, chronic illness, recovering from surgery)    Negative: Age > 60 years    Protocols used: ABDOMINAL PAIN - FEMALE-A-OH, ABDOMINAL PAIN - UPPER-A-OH

## 2023-05-11 ENCOUNTER — OFFICE VISIT (OUTPATIENT)
Dept: INTERNAL MEDICINE | Facility: CLINIC | Age: 38
End: 2023-05-11
Payer: COMMERCIAL

## 2023-05-11 VITALS
BODY MASS INDEX: 22.08 KG/M2 | TEMPERATURE: 97.7 F | RESPIRATION RATE: 16 BRPM | SYSTOLIC BLOOD PRESSURE: 110 MMHG | WEIGHT: 120 LBS | OXYGEN SATURATION: 99 % | HEIGHT: 62 IN | HEART RATE: 64 BPM | DIASTOLIC BLOOD PRESSURE: 62 MMHG

## 2023-05-11 DIAGNOSIS — R10.13 ABDOMINAL PAIN, EPIGASTRIC: Primary | ICD-10-CM

## 2023-05-11 LAB
ALBUMIN SERPL BCG-MCNC: 4.6 G/DL (ref 3.5–5.2)
ALP SERPL-CCNC: 68 U/L (ref 35–104)
ALT SERPL W P-5'-P-CCNC: 28 U/L (ref 10–35)
ANION GAP SERPL CALCULATED.3IONS-SCNC: 12 MMOL/L (ref 7–15)
AST SERPL W P-5'-P-CCNC: 31 U/L (ref 10–35)
BILIRUB SERPL-MCNC: 0.4 MG/DL
BUN SERPL-MCNC: 10.2 MG/DL (ref 6–20)
CALCIUM SERPL-MCNC: 9.3 MG/DL (ref 8.6–10)
CHLORIDE SERPL-SCNC: 105 MMOL/L (ref 98–107)
CREAT SERPL-MCNC: 0.7 MG/DL (ref 0.51–0.95)
DEPRECATED HCO3 PLAS-SCNC: 24 MMOL/L (ref 22–29)
ERYTHROCYTE [DISTWIDTH] IN BLOOD BY AUTOMATED COUNT: 11.6 % (ref 10–15)
GFR SERPL CREATININE-BSD FRML MDRD: >90 ML/MIN/1.73M2
GLUCOSE SERPL-MCNC: 85 MG/DL (ref 70–99)
HCT VFR BLD AUTO: 42.5 % (ref 35–47)
HGB BLD-MCNC: 14.6 G/DL (ref 11.7–15.7)
MCH RBC QN AUTO: 32.5 PG (ref 26.5–33)
MCHC RBC AUTO-ENTMCNC: 34.4 G/DL (ref 31.5–36.5)
MCV RBC AUTO: 95 FL (ref 78–100)
PLATELET # BLD AUTO: 253 10E3/UL (ref 150–450)
POTASSIUM SERPL-SCNC: 4.3 MMOL/L (ref 3.4–5.3)
PROT SERPL-MCNC: 7.4 G/DL (ref 6.4–8.3)
RBC # BLD AUTO: 4.49 10E6/UL (ref 3.8–5.2)
SODIUM SERPL-SCNC: 141 MMOL/L (ref 136–145)
WBC # BLD AUTO: 5.8 10E3/UL (ref 4–11)

## 2023-05-11 PROCEDURE — 36415 COLL VENOUS BLD VENIPUNCTURE: CPT | Performed by: INTERNAL MEDICINE

## 2023-05-11 PROCEDURE — 99213 OFFICE O/P EST LOW 20 MIN: CPT | Performed by: INTERNAL MEDICINE

## 2023-05-11 PROCEDURE — 87338 HPYLORI STOOL AG IA: CPT | Performed by: INTERNAL MEDICINE

## 2023-05-11 PROCEDURE — 85027 COMPLETE CBC AUTOMATED: CPT | Performed by: INTERNAL MEDICINE

## 2023-05-11 PROCEDURE — 80053 COMPREHEN METABOLIC PANEL: CPT | Performed by: INTERNAL MEDICINE

## 2023-05-11 RX ORDER — FAMOTIDINE 20 MG/1
20 TABLET, FILM COATED ORAL DAILY
COMMUNITY
End: 2023-05-11

## 2023-05-11 NOTE — PROGRESS NOTES
1. Abdominal pain, epigastric  Ongoing dyspepsia.  I recommended omeprazole for a month.  Labs as below.  If liver enzymes elevated would recommend ultrasound.  We will check H. pylori stool antigen.  Treat if positive.  Follow-up within a month or 2 with a new PCP.  - omeprazole (PRILOSEC) 20 MG DR capsule; Take 1 capsule (20 mg) by mouth daily  Dispense: 30 capsule; Refill: 0  - Comprehensive metabolic panel (BMP + Alb, Alk Phos, ALT, AST, Total. Bili, TP); Future  - CBC with platelets; Future  - Helicobacter pylori Antigen Stool; Future    Subjective   Halie is a 38 year old, presenting for the following health issues:  Abdominal Pain (Upper quadrant pains x 1 wk (no changes in BM's) )        5/11/2023     7:06 AM   Additional Questions   Roomed by Michelle     History of Present Illness       Reason for visit:  Abdominal pain  Symptom onset:  1-2 weeks ago  Symptoms include:  Pain above bellybutton  Symptom intensity:  Moderate  Symptom progression:  Staying the same  Had these symptoms before:  No  What makes it worse:  Acidic foods, eating too much  What makes it better:  Pepcid    She eats 4 or more servings of fruits and vegetables daily.She consumes 0 sweetened beverage(s) daily.She exercises with enough effort to increase her heart rate 30 to 60 minutes per day.  She exercises with enough effort to increase her heart rate 5 days per week.   She is taking medications regularly.    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  She had a viral gastroenteritis few weeks ago.  Also had some chest pain here recently was thought to be possibly reflux.  Not seem to all resolved but  "now she has this persistent medic epigastric abdominal discomfort.  Pepcid seems to help it.  Once the Pepcid resolves it comes back.  She wonders if she has an ulcer.  No bleeding.  She is able to eat.  A pickle made it worse.  Otherwise different types of foods do not seem to make too much of a difference.  She has had no weight loss.  No fevers.  She wonders if she has H. pylori.      Review of Systems         Objective    /62 (BP Location: Left arm, Patient Position: Sitting, Cuff Size: Adult Regular)   Pulse 64   Temp 97.7  F (36.5  C) (Tympanic)   Resp 16   Ht 1.575 m (5' 2\")   Wt 54.4 kg (120 lb)   LMP 03/24/2023 (Approximate)   SpO2 99%   BMI 21.95 kg/m    Body mass index is 21.95 kg/m .  Physical Exam           Pleasant young woman.  She is not jaundiced.  Abdomen is benign.  No hepatosplenomegaly or tenderness on palpation            "

## 2023-05-12 LAB — H PYLORI AG STL QL IA: NEGATIVE

## 2023-06-02 DIAGNOSIS — R10.13 ABDOMINAL PAIN, EPIGASTRIC: ICD-10-CM

## 2023-06-02 NOTE — TELEPHONE ENCOUNTER
"Last Written Prescription Date: 5/11/2023  Last Fill Quantity: 30,  # refills: 0  Last office visit provider: 5/11/2023    Requested Prescriptions   Pending Prescriptions Disp Refills     omeprazole (PRILOSEC) 20 MG DR capsule [Pharmacy Med Name: OMEPRAZOLE DR 20 MG CAPSULE] 30 capsule 0     Sig: TAKE 1 CAPSULE BY MOUTH EVERY DAY       PPI Protocol Passed - 6/2/2023  3:35 PM        Passed - Not on Clopidogrel (unless Pantoprazole ordered)        Passed - No diagnosis of osteoporosis on record        Passed - Recent (12 mo) or future (30 days) visit within the authorizing provider's specialty     Patient has had an office visit with the authorizing provider or a provider within the authorizing providers department within the previous 12 mos or has a future within next 30 days. See \"Patient Info\" tab in inbasket, or \"Choose Columns\" in Meds & Orders section of the refill encounter.              Passed - Medication is active on med list        Passed - Patient is age 18 or older        Passed - No active pregnacy on record        Passed - No positive pregnancy test in past 12 months             Jason Craig RN 06/02/23 3:35 PM  "

## 2023-06-15 ENCOUNTER — OFFICE VISIT (OUTPATIENT)
Dept: URGENT CARE | Facility: URGENT CARE | Age: 38
End: 2023-06-15
Payer: COMMERCIAL

## 2023-06-15 VITALS
HEART RATE: 78 BPM | TEMPERATURE: 98.2 F | RESPIRATION RATE: 16 BRPM | OXYGEN SATURATION: 98 % | DIASTOLIC BLOOD PRESSURE: 60 MMHG | SYSTOLIC BLOOD PRESSURE: 106 MMHG

## 2023-06-15 DIAGNOSIS — R07.0 THROAT PAIN: Primary | ICD-10-CM

## 2023-06-15 LAB
DEPRECATED S PYO AG THROAT QL EIA: NEGATIVE
GROUP A STREP BY PCR: NOT DETECTED

## 2023-06-15 PROCEDURE — 87651 STREP A DNA AMP PROBE: CPT | Performed by: PHYSICIAN ASSISTANT

## 2023-06-15 PROCEDURE — 99213 OFFICE O/P EST LOW 20 MIN: CPT | Performed by: PHYSICIAN ASSISTANT

## 2023-06-15 NOTE — PROGRESS NOTES
Assessment & Plan     Throat pain    You or your child have a sore throat (pharyngitis). This infection is caused by a virus. It can cause throat pain that is worse when swallowing, aching all over, headache, and fever. The infection may be spread by coughing, kissing, or touching others after touching your mouth or nose. Antibiotic medicines don't work against viruses. They are not used for treating this illness.     Strep neg, culture pending  - Streptococcus A Rapid Screen w/Reflex to PCR  - Group A Streptococcus PCR Throat Swab      No follow-ups on file.    Levi Dodd, [facility], PA-C  [facility] MARGARITA Ambrose is a 38 year old, presenting for the following health issues:  Pharyngitis (Sore throat-kid has strep )        5/11/2023     7:06 AM   Additional Questions   Roomed by Michelle ROSENTHAL   Review of Systems   Constitutional, HEENT, cardiovascular, pulmonary, gi and gu systems are negative, except as otherwise noted.      Objective    /60   Pulse 78   Temp 98.2  F (36.8  C) (Tympanic)   Resp 16   LMP 03/24/2023 (Approximate)   SpO2 98%   There is no height or weight on file to calculate BMI.  Physical Exam   GENERAL: healthy, alert and no distress  EYES: Eyes grossly normal to inspection, PERRL and conjunctivae and sclerae normal  HENT: ear canals and TM's normal, nose and mouth without ulcers or lesions  NECK: no adenopathy, no asymmetry, masses, or scars and thyroid normal to palpation  RESP: lungs clear to auscultation - no rales, rhonchi or wheezes  CV: regular rate and rhythm, normal S1 S2, no S3 or S4, no murmur, click or rub, no peripheral edema and peripheral pulses strong  MS: no gross musculoskeletal defects noted, no edema  SKIN: no suspicious lesions or rashes  NEURO: Normal strength and tone, mentation intact and speech normal  PSYCH: mentation appears normal, affect normal/bright        Results for orders placed or performed in visit on 06/15/23    Streptococcus A Rapid Screen w/Reflex to PCR     Status: Normal    Specimen: Throat; Swab   Result Value Ref Range    Group A Strep antigen Negative Negative

## 2023-09-21 ENCOUNTER — IMMUNIZATION (OUTPATIENT)
Dept: NURSING | Facility: CLINIC | Age: 38
End: 2023-09-21
Payer: COMMERCIAL

## 2023-09-21 PROCEDURE — 90471 IMMUNIZATION ADMIN: CPT

## 2023-09-21 PROCEDURE — 90686 IIV4 VACC NO PRSV 0.5 ML IM: CPT

## 2023-12-23 ENCOUNTER — E-VISIT (OUTPATIENT)
Dept: URGENT CARE | Facility: CLINIC | Age: 38
End: 2023-12-23
Payer: COMMERCIAL

## 2023-12-23 ENCOUNTER — NURSE TRIAGE (OUTPATIENT)
Dept: NURSING | Facility: CLINIC | Age: 38
End: 2023-12-23

## 2023-12-23 DIAGNOSIS — R30.0 DYSURIA: Primary | ICD-10-CM

## 2023-12-23 PROCEDURE — 99207 PR NON-BILLABLE SERV PER CHARTING: CPT | Performed by: FAMILY MEDICINE

## 2023-12-23 NOTE — TELEPHONE ENCOUNTER
Nurse Triage SBAR    Is this a 2nd Level Triage? NO    Situation: Pt calling with concerns for a UTI.    Background: Pt states symptoms started a week ago.    Assessment: Pt states she has bladder pain and slight intermittent back pain. She also endorses going more often than usual. Denies dysuria, hematuria, and fever.    Protocol Recommended Disposition:   See PCP Within 24 Hours    Recommendation: Discussed options of going to , E-visit, and/or [facility] clinic. Pt states she will do one of the above.     Reason for Disposition   Urinating more frequently than usual (i.e., frequency)   Side (flank) or lower back pain present    Additional Information   Negative: Shock suspected (e.g., cold/pale/clammy skin, too weak to stand, low BP, rapid pulse)   Negative: Sounds like a life-threatening emergency to the triager   Negative: [1] Unable to urinate (or only a few drops) > 4 hours AND [2] bladder feels very full (e.g., palpable bladder or strong urge to urinate)   Negative: [1] Decreased urination and [2] drinking very little AND [2] dehydration suspected (e.g., dark urine, no urine > 12 hours, very dry mouth, very lightheaded)   Negative: Patient sounds very sick or weak to the triager   Negative: Fever > 100.4 F (38.0 C)    Protocols used: Urinary Symptoms-A-ANJEL Arce RN  [facility] Nurse Advisor   12/23/2023  3:29 PM

## 2023-12-23 NOTE — PATIENT INSTRUCTIONS
Dear Halie Rendon,     After reviewing your responses, I would like you to come in for a urine test to make sure we treat you correctly. This urine test is to evaluate you for a possible urinary tract infection, and should be scheduled for today or tomorrow. Schedule a Lab Only appointment here.     Lab appointments are not available at most locations on the weekends. If no Lab Only appointment is available, you should be seen in any of our convenient Walk-in or Urgent Care Centers, which can be found on our website here.     You will receive instructions with your results in Telik once they are available.     If your symptoms worsen, you develop pain in your back or stomach, develop fevers, or are not improving in 5 days, please contact your primary care provider for an appointment or visit a Walk-in or Urgent Care Center to be seen.     Thanks again for choosing us as your health care partner,     Benjamin Barth MD

## 2024-01-24 ENCOUNTER — TELEPHONE (OUTPATIENT)
Dept: NURSING | Facility: CLINIC | Age: 39
End: 2024-01-24
Payer: COMMERCIAL

## 2024-01-24 NOTE — TELEPHONE ENCOUNTER
Order request:     TB test needed for school     Writer transferred caller to the clinic TC line to discuss this process.     Gabriela Leon RN  [facility] Nurse Advisor 10:10 AM 1/24/2024

## 2024-01-26 ENCOUNTER — LAB (OUTPATIENT)
Dept: LAB | Facility: CLINIC | Age: 39
End: 2024-01-26
Payer: COMMERCIAL

## 2024-01-26 DIAGNOSIS — R30.0 DYSURIA: ICD-10-CM

## 2024-01-26 DIAGNOSIS — Z11.1 SCREENING EXAMINATION FOR PULMONARY TUBERCULOSIS: Primary | ICD-10-CM

## 2024-01-26 DIAGNOSIS — Z11.1 SCREENING EXAMINATION FOR PULMONARY TUBERCULOSIS: ICD-10-CM

## 2024-01-26 PROCEDURE — 36415 COLL VENOUS BLD VENIPUNCTURE: CPT

## 2024-01-26 PROCEDURE — 86481 TB AG RESPONSE T-CELL SUSP: CPT

## 2024-01-29 LAB
GAMMA INTERFERON BACKGROUND BLD IA-ACNC: 0.03 IU/ML
M TB IFN-G BLD-IMP: NEGATIVE
M TB IFN-G CD4+ BCKGRND COR BLD-ACNC: 9.97 IU/ML
MITOGEN IGNF BCKGRD COR BLD-ACNC: 0 IU/ML
MITOGEN IGNF BCKGRD COR BLD-ACNC: 0.01 IU/ML
QUANTIFERON MITOGEN: 10 IU/ML
QUANTIFERON NIL TUBE: 0.03 IU/ML
QUANTIFERON TB1 TUBE: 0.03 IU/ML
QUANTIFERON TB2 TUBE: 0.04

## 2024-05-08 ENCOUNTER — MYC MEDICAL ADVICE (OUTPATIENT)
Dept: FAMILY MEDICINE | Facility: CLINIC | Age: 39
End: 2024-05-08
Payer: COMMERCIAL

## 2024-07-07 ENCOUNTER — HEALTH MAINTENANCE LETTER (OUTPATIENT)
Age: 39
End: 2024-07-07

## 2024-09-02 ENCOUNTER — NURSE TRIAGE (OUTPATIENT)
Dept: NURSING | Facility: CLINIC | Age: 39
End: 2024-09-02
Payer: COMMERCIAL

## 2024-09-02 NOTE — TELEPHONE ENCOUNTER
August Ambrose has been having Intermittent episodes of feeling her Heart Pounding in her chest    - Last ~30 seconds; Happens about every 15 min.  - Happens at rest; Notices it more at rest.  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    Advised to see PCP within 2 weeks - [facility]  Care Advice reviewed    Maria Guadalupe Catalan RN  [facility] Nurse Advisors      Reason for Disposition   Problems with anxiety or stress    Additional Information   Negative: Passed out (i.e., lost consciousness, collapsed and was not responding)   Negative: Shock suspected (e.g., cold/pale/clammy skin, too weak to stand, low BP, rapid pulse)   Negative: Difficult to awaken or acting confused (e.g., disoriented, slurred speech)   Negative: Visible sweat on face or sweat dripping down face   Negative: Unable to walk, or can only walk with assistance (e.g., requires support)   Negative: [1] Received SHOCK from implantable cardiac defibrillator AND [2] persisting symptoms (i.e., palpitations, lightheadedness)   Negative: [1] Dizziness, lightheadedness, or weakness AND [2] heart beating very rapidly (e.g., > 140 / minute)   Negative: [1] Dizziness, lightheadedness, or weakness AND [2] heart beating very slowly (e.g., < 50 / minute)   Negative: Sounds like a life-threatening emergency to the triager   Negative: Difficulty breathing   Negative: Dizziness, lightheadedness, or weakness   Negative: [1] Heart beating very rapidly (e.g., > 140 / minute) AND [2] present now  (Exception: During exercise.)   Negative: Heart beating very slowly (e.g., < 50 / minute)  (Exception: Athlete and heart rate normal for caller.)   Negative: New or worsened shortness of breath with activity (dyspnea on exertion)   Negative: Patient sounds very sick or weak to  "the triager   Negative: [1] Heart beating very rapidly (e.g., > 140 / minute) AND [2] not present now  (Exception: During exercise.)   Negative: [1] Skipped or extra beat(s) AND [2] increases with exercise or exertion   Negative: [1] Skipped or extra beat(s) AND [2] occurs 4 or more times per minute   Negative: New or worsened ankle swelling   Negative: History of heart disease (i.e., heart attack, bypass surgery, angina, angioplasty, CHF)  (Exception: Brief heartbeat symptoms that went away and now feels well.)   Negative: Age > 60 years  (Exception: Brief heartbeat symptoms that went away and now feels well.)   Negative: Taking water pill (i.e., diuretic) or heart medication (e.g., digoxin)   Negative: Wearing a \"Holter monitor\" or \"cardiac event monitor\"   Negative: [1] Received SHOCK from implantable cardiac defibrillator AND [2] now feels well   Negative: Heart rhythm alert (e.g., \"you have irregular heartbeat\") from personal wearable device (e.g., Apple Watch)   Negative: History of hyperthyroidism or taking thyroid medication   Negative: Substance use (drug use) or misuse, known or suspected   Negative: [1] ADHD AND [2] taking stimulant medication   Negative: [1] Palpitations AND [2] no improvement after using Care Advice    Protocols used: Heart Rate and Heartbeat Avysngnea-C-GY    "

## 2024-09-13 ENCOUNTER — IMMUNIZATION (OUTPATIENT)
Dept: FAMILY MEDICINE | Facility: CLINIC | Age: 39
End: 2024-09-13
Payer: COMMERCIAL

## 2024-09-13 PROCEDURE — 90471 IMMUNIZATION ADMIN: CPT

## 2024-09-13 PROCEDURE — 90656 IIV3 VACC NO PRSV 0.5 ML IM: CPT

## 2024-09-18 NOTE — PROGRESS NOTES
"Please see \"Imaging\" tab under \"Chart Review\" for details of today's US.    Roberta Churchill, DO    " Patient's airway examined, Neck FROM, dentition intact. TMJ FROM. Good mouth opening  Midline trachea.  Adequate oral aperture/mento-hyoid distance/cervical range of motion.  CV RRR  CTAB

## 2024-09-19 ENCOUNTER — NURSE TRIAGE (OUTPATIENT)
Dept: NURSING | Facility: CLINIC | Age: 39
End: 2024-09-19
Payer: COMMERCIAL

## 2024-09-20 NOTE — TELEPHONE ENCOUNTER
Nurse Triage SBAR    Is this a 2nd Level Triage? NO    Situation: Unusual Vaginal Bleeding    Background: na    Assessment: Patient reports onset of heavy vaginal bleeding today, she reports changing tampon every 2 hours, did have a heavy episode needing to change after 90 minutes related to a large clot. Blood clot similar to the size of silver dollar. She is having intermittent abdominal cramping, decreased in severity with heating pad. She reports that she has brown discharge half of her cycle chronically for a couple of years. Denies severe weakness, confusion, fainting, severe abdominal pain, dizziness, constant abdominal pain, or pale skin.    Protocol Recommended Disposition:   See PCP Within 2 Weeks    Recommendation: According to the protocol, patient should see provider within 2 weeks.  Care advice given.     CALL BACK IF: * Severe abdomen pain or dizziness occurs * Bleeding increases * You become worse     Patient verbalizes understanding and agrees with plan of care. Patient plans to call scheduling tomorrow.    Delicia Humphries RN  09/19/24 9:34 PM  [facility] Nurse Advisor    Reason for Disposition   Periods with > 6 soaked pads or tampons per day    Additional Information   Negative: Shock suspected (e.g., cold/pale/clammy skin, too weak to stand, low BP, rapid pulse)   Negative: Difficult to awaken or acting confused (e.g., disoriented, slurred speech)   Negative: Passed out (i.e., lost consciousness, collapsed and was not responding)   Negative: Sounds like a life-threatening emergency to the triager   Negative: Followed a genital area injury (e.g., vagina, vulva)   Negative: Pregnant 20 or more weeks   Negative: Pregnant < 20 weeks  (less than 5 months)   Negative: Postpartum (from 0 to 6 weeks after delivery)   Negative: Bleeding occurring > 12 months after menopause   Negative: [1] Vaginal discharge is main symptom AND [2] small amount of blood   Negative: SEVERE abdominal pain    Negative: SEVERE dizziness (e.g., unable to stand, requires support to walk, feels like passing out now)   Negative: SEVERE vaginal bleeding (e.g., soaking 2 pads or tampons per hour and present 2 or more hours; 1 menstrual cup every 2 hours)   Negative: Patient sounds very sick or weak to the triager   Negative: MODERATE vaginal bleeding (e.g., soaking 1 pad or tampon per hour and present > 6 hours; 1 menstrual cup every 6 hours)   Negative: [1] Constant abdominal pain AND [2] present > 2 hours   Negative: Pale skin (pallor) of new-onset or worsening   Negative: Passed tissue (e.g., gray-white)   Negative: Taking Coumadin (warfarin) or other strong blood thinner, or known bleeding disorder (e.g., thrombocytopenia)   Negative: [1] Skin bruises or nosebleed AND [2] not caused by an injury   Negative: [1] Periods with > 6 soaked pads or tampons per day AND [2] last > 7 days   Negative: [1] Bleeding or spotting after procedure (e.g., biopsy) or pelvic examination (e.g., pap smear) AND [2] lasts > 7 days    Protocols used: Vaginal Bleeding - [id number]

## 2024-10-11 ENCOUNTER — NURSE TRIAGE (OUTPATIENT)
Dept: FAMILY MEDICINE | Facility: CLINIC | Age: 39
End: 2024-10-11
Payer: COMMERCIAL

## 2024-10-11 NOTE — TELEPHONE ENCOUNTER
"Reason for Disposition   Minor burn and last tetanus shot > 5 years ago    Additional Information   Negative: Difficulty breathing after exposure to fire, smoke, or fumes   Negative: Difficult to awaken or acting confused (e.g., disoriented, slurred speech)   Negative: Burn area larger than 20 palms of hand (> 10% BSA) with blisters   Negative: Sounds like a life-threatening emergency to the triager   Negative: Chemical gets into the eye from fingers, contaminated object, spray or splash   Negative: Sunburn   Negative: Burn area larger than 8 palms of hand (> 4% BSA)   Negative: Burn completely circles an arm or leg   Negative: Caused by explosion or gunpowder   Negative: Headache or nausea after exposure to fire and smoke   Negative: Hoarseness or cough after exposure to fire and smoke   Negative: Blister (intact or ruptured) and larger than 2 inches (5 cm)   Negative: Blister (intact or ruptured) on the hand and larger than 1 inch (2.5 cm)   Negative: Blisters (intact or ruptured) on the face, neck, or genitals   Negative: Caused by very hot substance and center of burn is white (or charred)   Negative: Sounds like a serious burn to the triager   Negative: SEVERE pain (e.g., excruciating)   Negative: Acid or alkali (lye) burn   Negative: Chemical on skin that causes a blister   Negative: Looks infected (e.g., fever, red streaks, spreading red area, pus)   Negative: Broken (ruptured) blister and caller doesn't want to trim the dead skin   Negative: Suspicious history for the burn   Negative: Minor burn and no prior tetanus shots (or is not fully vaccinated)   Negative: Minor burn and HIV positive or severe immunodeficiency (severely weak immune system)    Answer Assessment - Initial Assessment Questions  1. ONSET: \"When did it happen?\" If happened < 3 hours ago, ask: \"Did you apply cold water?\" If not, give First Aid Advice immediately.       About 1 hour. Did put in cool tap water immediately.   2. LOCATION: " "\"Where is the burn located?\"       Left hand  3. BURN SIZE: \"How large is the burn?\"  The palm is roughly 1% of the total body surface area (BSA).      All of pointer finger and part of middle finger.   4. SEVERITY OF THE BURN: \"Are there any blisters?\"       No blisters.   5. MECHANISM: \"Tell me how it happened.\"      Boiling water for hot coffee.   6. PAIN: \"Are you having any pain?\" \"How bad is the pain?\" (Scale 1-10; or mild, moderate, severe)    - MILD (1-3): doesn't interfere with normal activities     - MODERATE (4-7): interferes with normal activities or awakens from sleep     - SEVERE (8-10): excruciating pain, unable to do any normal activities       If in cold water pain is zero or a one. 5/10 when out of water.   7. INHALATION INJURY: \"Were you exposed to any smoke or fumes?\" If Yes, ask: \"Do you have any cough or difficulty breathing?\"      no  8. OTHER SYMPTOMS: \"Do you have any other symptoms?\" (e.g., headache, nausea)      no  9. PREGNANCY: \"Is there any chance you are pregnant?\" \"When was your last menstrual period?\"      Reports not pregnant, LMP 3 weeks ago    Protocols used: Burns-A-QUINTIN Gomez RN    "

## 2024-11-14 ENCOUNTER — OFFICE VISIT (OUTPATIENT)
Dept: URGENT CARE | Facility: URGENT CARE | Age: 39
End: 2024-11-14
Payer: COMMERCIAL

## 2024-11-14 VITALS
HEART RATE: 90 BPM | TEMPERATURE: 98.1 F | RESPIRATION RATE: 16 BRPM | DIASTOLIC BLOOD PRESSURE: 68 MMHG | SYSTOLIC BLOOD PRESSURE: 104 MMHG | OXYGEN SATURATION: 96 %

## 2024-11-14 DIAGNOSIS — H92.03 OTALGIA, BILATERAL: Primary | ICD-10-CM

## 2024-11-14 PROCEDURE — 99213 OFFICE O/P EST LOW 20 MIN: CPT | Performed by: FAMILY MEDICINE

## 2024-11-14 NOTE — PROGRESS NOTES
SUBJECTIVE:   Halie Rendon is a 39 year old female presenting with a chief complaint of bilateral ear pain and ringing.    Onset of symptoms was 1 day(s) ago.  Course of illness is worsening.    Severity moderate  Current and Associated symptoms: ear pain, ringing  Treatment measures tried include None tried.  Predisposing factors include None.    Had cold symptoms 2 weeks ago  Wondering about ear wax, had similar episode before and symptoms improved after ear lavage    Home rapid COVID test negative    Past Medical History:   Diagnosis Date    Anxiety     Gestational diabetes mellitus     diet controlled    IUD contraception Nov 2018 -April 2019    Paragard    Nephrolithiasis     has a kidney stone    Postpartum depression 2014    No medication    Varicella vaccination 2014    documented immunity     Current Outpatient Medications   Medication Sig Dispense Refill    multivitamin w/minerals (THERA-VIT-M) tablet Take 1 tablet by mouth daily prn      Omega-3 Fatty Acids (FISH OIL PO) Take 1 capsule by mouth daily prn (Patient not taking: Reported on 11/14/2024)      omeprazole (PRILOSEC) 20 MG DR capsule TAKE 1 CAPSULE BY MOUTH EVERY DAY 90 capsule 3     Social History     Tobacco Use    Smoking status: Former    Smokeless tobacco: Never    Tobacco comments:     rare cigarette use   Substance Use Topics    Alcohol use: No     Comment: rare       ROS:  Review of systems negative except as stated above.    OBJECTIVE:  /68 (BP Location: Right arm, Patient Position: Sitting, Cuff Size: Adult Regular)   Pulse 90   Temp 98.1  F (36.7  C) (Temporal)   Resp 16   LMP 11/13/2024 (Exact Date)   SpO2 96%   GENERAL APPEARANCE: healthy, alert and no distress  EYES: EOMI,  PERRL, conjunctiva clear  HENT: ear canals and TM's normal, scant cerumen  PSYCH: mentation appears normal and affect normal/bright    ASSESSMENT/PLAN:  (H92.03) Otalgia, bilateral  (primary encounter diagnosis)  Comment: eustachian tube  dysfunction  Plan: sudafed, flonase      Reassurance given, reviewed that symptoms most likely due to eustachian tube dysfunction.  Encourage flonase and sudafed.  Reassurance given that does not have cerumen impaction that requires ear irrigation, okay to try OTC debrox to prevent excessive cerumen buildup    Follow up with primary provider if no improvement of symptoms in 1-2 weeks    Wilner Flower MD  November 14, 2024 3:20 PM

## 2024-11-15 ENCOUNTER — NURSE TRIAGE (OUTPATIENT)
Dept: PEDIATRICS | Facility: CLINIC | Age: 39
End: 2024-11-15

## 2024-11-15 NOTE — TELEPHONE ENCOUNTER
"Pt agreed to Disposition - to be seen TODAY or TOMORROW - and assisted with scheduling the following:    Nov 15, 2024 3:30 PM  (Arrive by 3:10 PM)  Provider Visit with Jo Machado MD  [facility] ([facility] [place]  ) [phone]          Pt was seen in  11/14 for ear pain - see encounter for details.    Pt also mentioned that she'd like to be seen sooner, and triage nurse encouraged her to go to . Pt agreed and likely will be going this morning. Triage nurse educated pt that she can cancel OV if/when she is seen by , and pt verbalized understanding.    Sissy Trimble RN      1. ONSET: \"When did the cough begin?\"       2 weeks, worsened over last 4 days  2. SEVERITY: \"How bad is the cough today?\"       Mild-moderate, 'wetter' and now productive, coughing a bit more when laying down  3. SPUTUM: \"Describe the color of your sputum\" (none, dry cough; clear, white, yellow, green)      Yes - cream color, slight yellow  4. HEMOPTYSIS: \"Are you coughing up any blood?\" If so ask: \"How much?\" (flecks, streaks, tablespoons, etc.)      Denies  5. DIFFICULTY BREATHING: \"Are you having difficulty breathing?\" If Yes, ask: \"How bad is it?\" (e.g., mild, moderate, severe)     - MILD: No SOB at rest, mild SOB with walking, speaks normally in sentences, can lie down, no retractions, pulse < 100.     - MODERATE: SOB at rest, SOB with minimal exertion and prefers to sit, cannot lie down flat, speaks in phrases, mild retractions, audible wheezing, pulse 100-120.     - SEVERE: Very SOB at rest, speaks in single words, struggling to breathe, sitting hunched forward, retractions, pulse > 120       None-mild  6. FEVER: \"Do you have a fever?\" If Yes, ask: \"What is your temperature, how was it measured, and when did it start?\"      Denies  7. CARDIAC HISTORY: \"Do you have any history of heart disease?\" (e.g., heart attack, congestive heart failure)       Denies  8. LUNG HISTORY: \"Do " "you have any history of lung disease?\"  (e.g., pulmonary embolus, asthma, emphysema)      Denies  9. PE RISK FACTORS: \"Do you have a history of blood clots?\" (or: recent major surgery, recent prolonged travel, bedridden)      Denies  10. OTHER SYMPTOMS: \"Do you have any other symptoms?\" (e.g., runny nose, wheezing, chest pain)        Chest pain with coughing - around sternum, fatigue, mild headache  11. PREGNANCY: \"Is there any chance you are pregnant?\" \"When was your last menstrual period?\"        Denies  12. TRAVEL: \"Have you traveled out of the country in the last month?\" (e.g., travel history, exposures)        Denies    Reason for Disposition   Patient wants to be seen    Additional Information   Negative: Bluish (or gray) lips or face   Negative: SEVERE difficulty breathing (e.g., struggling for each breath, speaks in single words)   Negative: Rapid onset of cough and has hives   Negative: Coughing started suddenly after medicine, an allergic food or bee sting   Negative: Difficulty breathing after exposure to flames, smoke, or fumes   Negative: Sounds like a life-threatening emergency to the triager   Negative: Previous asthma attacks and this feels like asthma attack   Negative: Dry cough (non-productive; no sputum or minimal clear sputum) and within 14 days of COVID-19 Exposure   Negative: MODERATE difficulty breathing (e.g., speaks in phrases, SOB even at rest, pulse 100-120) and still present when not coughing   Negative: Chest pain present when not coughing   Negative: Passed out (i.e., fainted, collapsed and was not responding)   Negative: Patient sounds very sick or weak to the triager   Negative: MILD difficulty breathing (e.g., minimal/no SOB at rest, SOB with walking, pulse <100) and still present when not coughing   Negative: Coughed up > 1 tablespoon (15 ml) blood (Exception: Blood-tinged sputum.)   Negative: Fever > 103 F (39.4 C)   Negative: Fever > 101 F (38.3 C) and over 60 years of age   " Negative: Fever > 100.0 F (37.8 C) and has diabetes mellitus or a weak immune system (e.g., HIV positive, cancer chemotherapy, organ transplant, splenectomy, chronic steroids)   Negative: Fever > 100.0 F (37.8 C) and bedridden (e.g., CVA, chronic illness, recovering from surgery)   Negative: Increasing ankle swelling   Negative: Wheezing is present   Negative: SEVERE coughing spells (e.g., whooping sound after coughing, vomiting after coughing)   Negative: Coughing up lolly-colored (reddish-brown) or blood-tinged sputum   Negative: Fever present > 3 days (72 hours)   Negative: Fever returns after gone for over 24 hours and symptoms worse or not improved   Negative: Using nasal washes and pain medicine > 24 hours and sinus pain persists   Negative: Known COPD or other severe lung disease (i.e., bronchiectasis, cystic fibrosis, lung surgery) and worsening symptoms (i.e., increased sputum purulence or amount, increased breathing difficulty)   Negative: Continuous (nonstop) coughing interferes with work or school and no improvement using cough treatment per Care Advice    Protocols used: Cough-A-OH

## 2024-11-19 ENCOUNTER — NURSE TRIAGE (OUTPATIENT)
Dept: INTERNAL MEDICINE | Facility: CLINIC | Age: 39
End: 2024-11-19
Payer: COMMERCIAL

## 2024-11-19 ENCOUNTER — OFFICE VISIT (OUTPATIENT)
Dept: FAMILY MEDICINE | Facility: CLINIC | Age: 39
End: 2024-11-19
Payer: COMMERCIAL

## 2024-11-19 ENCOUNTER — ANCILLARY PROCEDURE (OUTPATIENT)
Dept: GENERAL RADIOLOGY | Facility: CLINIC | Age: 39
End: 2024-11-19
Attending: PHYSICIAN ASSISTANT
Payer: COMMERCIAL

## 2024-11-19 VITALS
SYSTOLIC BLOOD PRESSURE: 104 MMHG | HEART RATE: 61 BPM | OXYGEN SATURATION: 97 % | TEMPERATURE: 98 F | DIASTOLIC BLOOD PRESSURE: 64 MMHG | RESPIRATION RATE: 16 BRPM

## 2024-11-19 DIAGNOSIS — R05.1 ACUTE COUGH: Primary | ICD-10-CM

## 2024-11-19 PROCEDURE — 99213 OFFICE O/P EST LOW 20 MIN: CPT

## 2024-11-19 PROCEDURE — 71046 X-RAY EXAM CHEST 2 VIEWS: CPT | Mod: TC | Performed by: RADIOLOGY

## 2024-11-19 RX ORDER — BENZONATATE 100 MG/1
100 CAPSULE ORAL 3 TIMES DAILY PRN
Qty: 30 CAPSULE | Refills: 0 | Status: SHIPPED | OUTPATIENT
Start: 2024-11-19

## 2024-11-19 ASSESSMENT — ENCOUNTER SYMPTOMS: COUGH: 1

## 2024-11-19 NOTE — PROGRESS NOTES
Assessment & Plan       1. 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 She is not aware of exposure. Cough is ongoing for 3 weeks. Patient is interested in antibiotic therapy if the test is positive even though cough symptoms are > 3 weeks.   -Chest x ray is negative for cardiopulmonary disease per Radiology report  - B. pertussis/parapertussis PCR-NP  - XR Chest 2 Views  - benzonatate (TESSALON) 100 MG capsule; Take 1 capsule (100 mg) by mouth 3 times daily as needed for cough.  Dispense: 30 capsule; Refill: 0     Results for orders placed or performed in visit on 11/19/24   XR Chest 2 Views     Status: None    Narrative    XR CHEST 2 VIEWS 11/19/2024 11:50 AM    HISTORY: Acute cough    COMPARISON: None.      Impression    IMPRESSION: No acute cardiopulmonary process.    FIORELLA PARTIDA MD         SYSTEM ID:  [id number]       Patient Instructions   Chest xray is negative for cardiopulmomary disease  Pertussis test is pending. You will be notified of results through my chart or call  For productive cough I suggest using over the counter medications such as guaifenesin (Mucinex). Mucinex will reduce the viscosity of mucus secretions and help with productive cough. Drink plenty of fluids while on Mucinex.   You can also take the cough suppressant I prescribed, tessalon perles at night to suppress the cough/ Follow up in clinic if symptoms persist or worsen. This usually can last 7-10 days.       Return if symptoms worsen or fail to improve, for Follow up.    At the end of the encounter, I discussed results, diagnosis, medications. Discussed red flags for immediate return to clinic/ER, as well as indications for follow up if no improvement. Patient understood and agreed to plan. Patient was stable for discharge.    Camden Ambrose is a 39 year old female who presents to clinic today for the following health issues:  Chief Complaint   Patient presents with     Urgent Care    Cough     3 wks with a cough. Pt is concern about whooping cough. No hx of asthma. No SOB     HPI    Patient reports cough for 3 weeks. She notes cough is sometimes productive of sputum. Cough is worse at night. She has been taking Sudafed and Dayquil intermittently. No hx of Asthma. 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 She is also concerned about TB. She denies fever,chills, shortness of breath or chest tightness.       Review of Systems   Respiratory:  Positive for cough.    All other systems reviewed and are negative.      Problem List:  2019: Family history of congenital anomalies  2018-10: IUD (intrauterine device) in place  2018: S/P   2018: Diet controlled gestational diabetes mellitus (GDM) in first   trimester  2018: History of gestational diabetes  2018: Family history of spina bifida  2018: Status post club foot correction at birth  2018: Personal history of renal calculi  2018: History of postpartum depression, currently pregnant  2018: Rubella non-immune status, antepartum  2018: High-risk pregnancy, young multigravida  2016: Screening for malignant neoplasm of cervix  2014-10: S/P   2014-10: Supervision of high-risk pregnancy of elderly multigravida  2014: Gestational diabetes mellitus, class A1  2014: Plans Paragard IUD postpartum  2014: Fetal cardiac arrhythmia  2014: Encounter for supervision of other normal pregnancy  2014: Gross hematuria--needs follow up postpartum with Urology.  2014: Indication for care in labor or delivery  2014: Club foot of fetus  2014: Gestational diabetes  2014: See [facility] records 14: Blood type, Rh negative/O Negative  2014: Family history of autism      Past Medical History:   Diagnosis Date    Anxiety     Gestational diabetes mellitus     diet controlled    IUD contraception 2018 -April  2019    Paragard    Nephrolithiasis     has a kidney stone    Postpartum depression 2014    No medication    Varicella vaccination 2014    documented immunity       Social History     Tobacco Use    Smoking status: Former    Smokeless tobacco: Never    Tobacco comments:     rare cigarette use   Substance Use Topics    Alcohol use: No     Comment: rare           Objective    /64   Pulse 61   Temp 98  F (36.7  C) (Tympanic)   Resp 16   LMP 11/13/2024 (Exact Date)   SpO2 97%   Physical Exam  Constitutional:       Appearance: Normal appearance.   HENT:      Head: Normocephalic.      Mouth/Throat:      Mouth: Mucous membranes are moist.      Pharynx: Oropharynx is clear. Uvula midline. No posterior oropharyngeal erythema.   Cardiovascular:      Rate and Rhythm: Normal rate and regular rhythm.   Pulmonary:      Effort: Pulmonary effort is normal.      Breath sounds: Normal breath sounds.   Lymphadenopathy:      Head:      Right side of head: No submental, submandibular or tonsillar adenopathy.      Left side of head: No submental, submandibular or tonsillar adenopathy.      Cervical: No cervical adenopathy.      Right cervical: No superficial cervical adenopathy.     Left cervical: No superficial cervical adenopathy.   Skin:     General: Skin is warm and dry.      Findings: No rash.   Neurological:      Mental Status: She is alert.   Psychiatric:         Mood and Affect: Mood normal.         Behavior: Behavior normal.              Terrei Torres PA-C

## 2024-11-19 NOTE — TELEPHONE ENCOUNTER
"Patient contacts clinic expressing that she continues to have symptoms noted in 11/15 nurse triage note, but does specify that her cough has mildly improved and continues to produce phlegm. Patient denies difficulty breathing.   Per triage protocol, patient was suggested to schedule appointment within 3-days, as patient has now had cough present for 3wks<. Patient states that she will \"just visit urgent care\". Call ended.    Reason for Disposition   Cough has been present for > 3 weeks    Additional Information   Negative: SEVERE difficulty breathing (e.g., struggling for each breath, speaks in single words)   Negative: Bluish (or gray) lips or face now   Negative: [1] Difficulty breathing AND [2] exposure to flames, smoke, or fumes   Negative: [1] Stridor AND [2] difficulty breathing   Negative: Sounds like a life-threatening emergency to the triager   Negative: [1] Previous asthma attacks AND [2] this feels like asthma attack   Negative: Dry cough (non-productive;  no sputum or minimal clear sputum)   Negative: [1] MODERATE difficulty breathing (e.g., speaks in phrases, SOB even at rest, pulse 100-120) AND [2] still present when not coughing   Negative: Chest pain  (Exception: MILD central chest pain, present only when coughing.)   Negative: Patient sounds very sick or weak to the triager   Negative: [1] MILD difficulty breathing (e.g., minimal/no SOB at rest, SOB with walking, pulse <100) AND [2] still present when not coughing   Negative: [1] Coughed up blood AND [2] > 1 tablespoon (15 ml)   (Exception: Blood-tinged sputum.)   Negative: Fever > 103 F (39.4 C)   Negative: [1] Fever > 101 F (38.3 C) AND [2] age > 60 years   Negative: [1] Fever > 100.0 F (37.8 C) AND [2] bedridden (e.g., CVA, chronic illness, recovering from surgery)   Negative: [1] Fever > 100.0 F (37.8 C) AND [2] diabetes mellitus or weak immune system (e.g., HIV positive, cancer chemo, splenectomy, organ transplant, chronic steroids)   Negative: " Wheezing is present   Negative: SEVERE coughing spells (e.g., whooping sound after coughing, vomiting after coughing)   Negative: [1] Continuous (nonstop) coughing interferes with work or school AND [2] no improvement using cough treatment per Care Advice   Negative: Coughing up lolly-colored (reddish-brown) sputum   Negative: Fever present > 3 days (72 hours)   Negative: [1] Fever returns after gone for over 24 hours AND [2] symptoms worse or not improved   Negative: [1] Using nasal washes and pain medicine > 24 hours AND [2] sinus pain (around cheekbone or eye) persists   Negative: Earache   Negative: [1] Known COPD or other severe lung disease (i.e., bronchiectasis, cystic fibrosis, lung surgery) AND [2] worsening symptoms (i.e., increased sputum purulence or amount, increased breathing difficulty    Protocols used: Cough - Acute Clynpixist-G-YM

## 2024-11-19 NOTE — PATIENT INSTRUCTIONS
Chest xray is negative for cardiopulmomary disease  Pertussis test is pending. You will be notified of results through my chart or call  For productive cough I suggest using over the counter medications such as guaifenesin (Mucinex). Mucinex will reduce the viscosity of mucus secretions and help with productive cough. Drink plenty of fluids while on Mucinex.   You can also take the cough suppressant I prescribed, tessalon perles at night to suppress the cough/ Follow up in clinic if symptoms persist or worsen.

## 2024-11-20 LAB
B PARAPERT DNA SPEC QL NAA+PROBE: ABNORMAL
B PERT DNA SPEC QL NAA+PROBE: ABNORMAL

## 2025-03-18 ENCOUNTER — NURSE TRIAGE (OUTPATIENT)
Dept: FAMILY MEDICINE | Facility: CLINIC | Age: 40
End: 2025-03-18
Payer: COMMERCIAL

## 2025-03-18 NOTE — TELEPHONE ENCOUNTER
"Patient does want to see a Provider for this spot on her breast as she has concerns of cancer. She would like to see someone to put her mind at ease/place any necessary referrals.    RN assisted Patient in getting scheduled for the following:    Appointments in Next Year      Mar 20, 2025 4:00 PM  (Arrive by 3:40 PM)  Provider Visit with ISABELA Deal CNP  [facility] ([facility] ) [phone]          1. APPEARANCE of RASH: \"Describe the rash.\"       R breast  2. LOCATION: \"Where is the rash located?\"       Purple in color, 2 MM  3. NUMBER: \"How many spots are there?\"       One [place], raised edges  4. SIZE: \"How big are the spots?\" (Inches, centimeters or compare to size of a coin)       One spot  5. ONSET: \"When did the rash start?\"       Several weeks ago. Unsure if it's been getting bigger, but hasn't been going away.  6. ITCHING: \"Does the rash itch?\" If Yes, ask: \"How bad is the itch?\"  (Scale 0-10; or none, mild, moderate, severe)      Denies  7. PAIN: \"Does the rash hurt?\" If Yes, ask: \"How bad is the pain?\"  (Scale 0-10; or none, mild, moderate, severe)      Denies  8. OTHER SYMPTOMS: \"Do you have any other symptoms?\" (e.g., fever)      Denies fever, no breast discharge  9. PREGNANCY: \"Is there any chance you are pregnant?\" \"When was your last menstrual period?\"      Denies  Reason for Disposition   Small spot, skin growth, or mole   Patient wants to be seen    Additional Information   Negative: Patient sounds very sick or weak to the triager   Negative: Fever and bump is tender to touch   Negative: Looks infected (e.g., spreading redness, pus)   Negative: Looks like a boil, infected sore, or deep ulcer   Negative: Caller can't describe it clearly    Protocols used: Rash or Redness - [id number]-K-[id number], Skin Lesion - Moles or Growths-A-OH    Carolynn DODGE,  Triage RN  [facility]    "

## 2025-03-20 ENCOUNTER — OFFICE VISIT (OUTPATIENT)
Dept: FAMILY MEDICINE | Facility: CLINIC | Age: 40
End: 2025-03-20
Payer: COMMERCIAL

## 2025-03-20 VITALS
HEIGHT: 62 IN | HEART RATE: 71 BPM | DIASTOLIC BLOOD PRESSURE: 70 MMHG | BODY MASS INDEX: 23.92 KG/M2 | TEMPERATURE: 97.6 F | WEIGHT: 130 LBS | OXYGEN SATURATION: 97 % | RESPIRATION RATE: 16 BRPM | SYSTOLIC BLOOD PRESSURE: 110 MMHG

## 2025-03-20 DIAGNOSIS — Z83.719 FAMILY HISTORY OF COLONIC POLYPS: ICD-10-CM

## 2025-03-20 DIAGNOSIS — L98.9 SKIN LESION: Primary | ICD-10-CM

## 2025-03-20 ASSESSMENT — PAIN SCALES - GENERAL: PAINLEVEL_OUTOF10: NO PAIN (0)

## 2025-03-20 NOTE — PATIENT INSTRUCTIONS
[facility]  [address], [address]  [place], [place] [address]  Main Phone: [phone]     For the colonoscopy:   Dr Pedro SEBASTIAN  Address: [address] [place] [address] [place], [place], [place] [id number] [phone]

## 2025-03-20 NOTE — PROGRESS NOTES
"  Assessment & Plan     Skin lesion  Lesion appears to be benign broken blood vessel or cherry angioma. Advised her to follow up if the area changes. Referral placed to dermatology for routine skin check    - Adult Dermatology  Referral; Future    Family history of colonic polyps  Father had many polyps on recent colonoscopy and recommendation was for patient to receive colonoscopy. Sounds like polyposis though unsure. Colonoscopy order placed   - Colonoscopy Screening  Referral; Future        Subjective   Halie is a 40 year old, presenting for the following health issues:  Derm Problem (Patient is here to have a spot on her right breast x 3 weeks. )      3/20/2025     3:59 PM   Additional Questions   Roomed by Dada ROJAS MA   Accompanied by None     History of Present Illness       Reason for visit:  Spot on right breast  Symptom onset:  3-4 weeks ago  Symptoms include:  Little purple flat spot  Symptom intensity:  Mild  Symptom progression:  Staying the same  Had these symptoms before:  No  What makes it worse:  Nothing  What makes it better:  Nothing   She is taking medications regularly.        Spot on right areola she noticed a few weeks ago. It has no drainage, edema, or warmth. She measured the area at 2 mm  She thinks it is a broken blood vessel   She has some cherry angiomas otherwise nothing else similar   Breast cancer hx in family     Halie also states her father recently had many polyps on his colonoscopy and she was advised to do a colonoscopy.     Review of Systems  As detailed above      Objective    /70 (BP Location: Right arm, Patient Position: Sitting, Cuff Size: Adult Regular)   Pulse 71   Temp 97.6  F (36.4  C) (Temporal)   Resp 16   Ht 1.575 m (5' 2\")   Wt 59 kg (130 lb)   LMP 03/08/2025 (Exact Date)   SpO2 97%   BMI 23.78 kg/m    Body mass index is 23.78 kg/m .  Physical Exam  Constitutional:       General: She is not in acute distress.     Appearance: Normal " appearance.   Skin:     Comments: Pinpoint red lesion on right breast on inferior lateral areola without drainage, edema, or erythema    Neurological:      Mental Status: She is alert.              Signed Electronically by: ISABELA Deal CNP

## 2025-07-19 ENCOUNTER — HEALTH MAINTENANCE LETTER (OUTPATIENT)
Age: 40
End: 2025-07-19

## (undated) DEVICE — SU PLAIN 2-0 CT-1 27" 843H

## (undated) DEVICE — STPL SKIN 35W APPOSE 8886803712

## (undated) DEVICE — SUCTION CANISTER MEDIVAC LINER 3000ML W/LID 65651-530

## (undated) DEVICE — LINEN TOWEL PACK X10 5473

## (undated) DEVICE — SU MONOCRYL 2-0 CT-1 36" UND Y945H

## (undated) DEVICE — LINEN BABY BLANKET 5434

## (undated) DEVICE — STOCKING SLEEVE VASOPRESS COMPRESSION CALF MED VP501M

## (undated) DEVICE — PREP CHLORAPREP 26ML TINTED ORANGE  260815

## (undated) DEVICE — SU VICRYL 0 CT-1 36" J346H

## (undated) DEVICE — SU CHROMIC 1 CT 27" 803H

## (undated) DEVICE — BAG CLEAR TRASH 1.3M 39X33" P4040C

## (undated) DEVICE — GLOVE PROTEXIS BLUE W/NEU-THERA 6.5  2D73EB65

## (undated) DEVICE — ESU GROUND PAD ADULT W/CORD E7507

## (undated) DEVICE — BLADE CLIPPER SGL USE 9680

## (undated) DEVICE — TRANSFER DEVICE BLOOD NDL HOLDER 364880

## (undated) DEVICE — CATH TRAY FOLEY SURESTEP 16FR DRAIN BAG STATOCK A899916

## (undated) DEVICE — PACK C-SECTION LF PL15OTA83B

## (undated) DEVICE — LINEN HALF SHEET 5512

## (undated) DEVICE — SOL WATER IRRIG 1000ML BOTTLE 2F7114

## (undated) DEVICE — SOL NACL 0.9% IRRIG 1000ML BOTTLE 2F7124

## (undated) DEVICE — LINEN FULL SHEET 5511

## (undated) DEVICE — CAP BABY PINK/BLUE IC-2

## (undated) DEVICE — GLOVE PROTEXIS W/NEU-THERA 7.5  2D73TE75

## (undated) RX ORDER — EPHEDRINE SULFATE 50 MG/ML
INJECTION, SOLUTION INTRAMUSCULAR; INTRAVENOUS; SUBCUTANEOUS
Status: DISPENSED
Start: 2018-08-31

## (undated) RX ORDER — FENTANYL CITRATE 50 UG/ML
INJECTION, SOLUTION INTRAMUSCULAR; INTRAVENOUS
Status: DISPENSED
Start: 2018-08-31

## (undated) RX ORDER — OXYTOCIN/0.9 % SODIUM CHLORIDE 30/500 ML
PLASTIC BAG, INJECTION (ML) INTRAVENOUS
Status: DISPENSED
Start: 2018-08-31

## (undated) RX ORDER — GLYCOPYRROLATE 0.2 MG/ML
INJECTION INTRAMUSCULAR; INTRAVENOUS
Status: DISPENSED
Start: 2018-08-31

## (undated) RX ORDER — BUPIVACAINE HYDROCHLORIDE 7.5 MG/ML
INJECTION, SOLUTION INTRASPINAL
Status: DISPENSED
Start: 2018-08-31

## (undated) RX ORDER — MORPHINE SULFATE 1 MG/ML
INJECTION, SOLUTION EPIDURAL; INTRATHECAL; INTRAVENOUS
Status: DISPENSED
Start: 2018-08-31